# Patient Record
Sex: FEMALE | Race: WHITE | Employment: UNEMPLOYED | URBAN - METROPOLITAN AREA
[De-identification: names, ages, dates, MRNs, and addresses within clinical notes are randomized per-mention and may not be internally consistent; named-entity substitution may affect disease eponyms.]

---

## 2021-05-04 ENCOUNTER — HOSPITAL ENCOUNTER (OUTPATIENT)
Dept: NON INVASIVE DIAGNOSTICS | Age: 66
Discharge: HOME | End: 2021-05-04
Payer: MEDICARE

## 2021-05-04 ENCOUNTER — HOSPITAL ENCOUNTER (OUTPATIENT)
Dept: NUCLEAR MEDICINE | Age: 66
Discharge: HOME | End: 2021-05-04
Payer: MEDICARE

## 2021-05-04 VITALS — BODY MASS INDEX: 34.4 KG/M2 | HEIGHT: 68 IN | WEIGHT: 227 LBS

## 2021-05-04 DIAGNOSIS — I25.10 CORONARY ARTERY DISEASE WITHOUT ANGINA PECTORIS, UNSPECIFIED VESSEL OR LESION TYPE, UNSPECIFIED WHETHER NATIVE OR TRANSPLANTED HEART: ICD-10-CM

## 2021-05-04 LAB
LV EF: 88 %
LVEF MODALITY: NORMAL

## 2021-05-04 PROCEDURE — 3430000000 HC RX DIAGNOSTIC RADIOPHARMACEUTICAL: Performed by: RADIOLOGY

## 2021-05-04 PROCEDURE — 78452 HT MUSCLE IMAGE SPECT MULT: CPT

## 2021-05-04 PROCEDURE — 78452 HT MUSCLE IMAGE SPECT MULT: CPT | Performed by: INTERNAL MEDICINE

## 2021-05-04 PROCEDURE — 6360000002 HC RX W HCPCS: Performed by: NURSE PRACTITIONER

## 2021-05-04 PROCEDURE — A9500 TC99M SESTAMIBI: HCPCS | Performed by: RADIOLOGY

## 2021-05-04 PROCEDURE — 93017 CV STRESS TEST TRACING ONLY: CPT

## 2021-05-04 RX ORDER — OMEPRAZOLE 20 MG/1
40 CAPSULE, DELAYED RELEASE ORAL DAILY
COMMUNITY

## 2021-05-04 RX ORDER — RIVAROXABAN 10 MG/1
10 TABLET, FILM COATED ORAL
COMMUNITY

## 2021-05-04 RX ORDER — AMLODIPINE BESYLATE 5 MG/1
5 TABLET ORAL DAILY
COMMUNITY

## 2021-05-04 RX ORDER — PRAVASTATIN SODIUM 40 MG
40 TABLET ORAL DAILY
COMMUNITY

## 2021-05-04 RX ADMIN — Medication 30 MILLICURIE: at 10:20

## 2021-05-04 RX ADMIN — Medication 10 MILLICURIE: at 08:28

## 2021-05-04 RX ADMIN — REGADENOSON 0.4 MG: 0.08 INJECTION, SOLUTION INTRAVENOUS at 10:16

## 2021-05-05 NOTE — PROCEDURES
[address]                                 PROCEDURE NOTE    PATIENT NAME: Keturah Brambila                       :        1955  MED REC NO:   [id number]                            ROOM:  ACCOUNT NO:   [de-identified]                           ADMIT DATE: 2021  PROVIDER:     Harper Ibarra DO    DATE OF PROCEDURE:  2021    PROCEDURE PERFORMED:  Lexiscan stress test.    INDICATIONS:  The patient is a very polite and pleasant 80-year-old  female who follows with Lilian Beltre. She had an abnormal CT scan on  an outpatient basis, which indicated possible calcified coronary  arteries. Her cardiac risk factors include hypertension,  hyperlipidemia, and extensive tobacco abuse history. PROCEDURE IN DETAIL:  The patient was brought to the Cardiac Stress Lab  and connected to continuous cardiac monitoring. Resting EKG indicated  normal sinus rhythm. She was administered Lexiscan over 10-15 seconds  followed by injection of Cardiolite. She was placed in recovery at 1  minute. Throughout the examination, she had no unusual symptomatology. There was no active chest pain or shortness of breath. There was no  ectopy or dysrhythmia. There were no ST or T-wave abnormalities  identified. She had physiologic response to both blood pressure and  heart rate. She tolerated the procedure well. She will now be  transferred to the Imaging Lab for final stress pictures.         Katia Patricio DO    D: 2021 10:21:00       T: 2021 16:08:43     [email or web address]  Job#: [id number]     Doc#: [id number]    CC:

## 2021-10-31 ENCOUNTER — APPOINTMENT (OUTPATIENT)
Dept: GENERAL RADIOLOGY | Age: 66
End: 2021-10-31
Payer: MEDICARE

## 2021-10-31 ENCOUNTER — HOSPITAL ENCOUNTER (EMERGENCY)
Age: 66
Discharge: HOME | End: 2021-10-31
Attending: EMERGENCY MEDICINE
Payer: MEDICARE

## 2021-10-31 VITALS
DIASTOLIC BLOOD PRESSURE: 94 MMHG | WEIGHT: 230 LBS | SYSTOLIC BLOOD PRESSURE: 172 MMHG | RESPIRATION RATE: 16 BRPM | TEMPERATURE: 97 F | OXYGEN SATURATION: 97 % | BODY MASS INDEX: 34.97 KG/M2 | HEART RATE: 88 BPM

## 2021-10-31 DIAGNOSIS — Z71.89 ADVICE GIVEN ABOUT COVID-19 VIRUS INFECTION: ICD-10-CM

## 2021-10-31 DIAGNOSIS — J02.9 ACUTE PHARYNGITIS, UNSPECIFIED ETIOLOGY: Primary | ICD-10-CM

## 2021-10-31 LAB
ALBUMIN SERPL-MCNC: 4.4 G/DL (ref 3.5–5.2)
ALP BLD-CCNC: 86 U/L (ref 35–104)
ALT SERPL-CCNC: 13 U/L (ref 0–32)
ANION GAP SERPL CALCULATED.3IONS-SCNC: 10 MMOL/L (ref 7–16)
AST SERPL-CCNC: 17 U/L (ref 0–31)
BACTERIA: ABNORMAL /HPF
BASOPHILS ABSOLUTE: 0.1 E9/L (ref 0–0.2)
BASOPHILS RELATIVE PERCENT: 1.2 % (ref 0–2)
BILIRUB SERPL-MCNC: 0.4 MG/DL (ref 0–1.2)
BILIRUBIN URINE: NEGATIVE
BLOOD, URINE: ABNORMAL
BUN BLDV-MCNC: 14 MG/DL (ref 6–23)
CALCIUM SERPL-MCNC: 9.7 MG/DL (ref 8.6–10.2)
CHLORIDE BLD-SCNC: 106 MMOL/L (ref 98–107)
CLARITY: ABNORMAL
CO2: 23 MMOL/L (ref 22–29)
COLOR: YELLOW
CREAT SERPL-MCNC: 1.4 MG/DL (ref 0.5–1)
EOSINOPHILS ABSOLUTE: 0.34 E9/L (ref 0.05–0.5)
EOSINOPHILS RELATIVE PERCENT: 4.1 % (ref 0–6)
EPITHELIAL CELLS, UA: ABNORMAL /HPF
GFR AFRICAN AMERICAN: 46
GFR NON-AFRICAN AMERICAN: 38 ML/MIN/1.73
GLUCOSE BLD-MCNC: 90 MG/DL (ref 74–99)
GLUCOSE URINE: NEGATIVE MG/DL
HCT VFR BLD CALC: 41.7 % (ref 34–48)
HEMOGLOBIN: 12.6 G/DL (ref 11.5–15.5)
IMMATURE GRANULOCYTES #: 0.05 E9/L
IMMATURE GRANULOCYTES %: 0.6 % (ref 0–5)
KETONES, URINE: NEGATIVE MG/DL
LEUKOCYTE ESTERASE, URINE: ABNORMAL
LYMPHOCYTES ABSOLUTE: 2.63 E9/L (ref 1.5–4)
LYMPHOCYTES RELATIVE PERCENT: 31.5 % (ref 20–42)
MCH RBC QN AUTO: 29.7 PG (ref 26–35)
MCHC RBC AUTO-ENTMCNC: 30.2 % (ref 32–34.5)
MCV RBC AUTO: 98.3 FL (ref 80–99.9)
MONOCYTES ABSOLUTE: 0.58 E9/L (ref 0.1–0.95)
MONOCYTES RELATIVE PERCENT: 6.9 % (ref 2–12)
NEUTROPHILS ABSOLUTE: 4.66 E9/L (ref 1.8–7.3)
NEUTROPHILS RELATIVE PERCENT: 55.7 % (ref 43–80)
NITRITE, URINE: NEGATIVE
PDW BLD-RTO: 12.7 FL (ref 11.5–15)
PH UA: 5.5 (ref 5–9)
PLATELET # BLD: 233 E9/L (ref 130–450)
PMV BLD AUTO: 10.8 FL (ref 7–12)
POTASSIUM SERPL-SCNC: 4.2 MMOL/L (ref 3.5–5)
PROTEIN UA: NEGATIVE MG/DL
RBC # BLD: 4.24 E12/L (ref 3.5–5.5)
RBC UA: ABNORMAL /HPF (ref 0–2)
SARS-COV-2, NAAT: NOT DETECTED
SODIUM BLD-SCNC: 139 MMOL/L (ref 132–146)
SPECIFIC GRAVITY UA: 1.02 (ref 1–1.03)
TOTAL PROTEIN: 7.9 G/DL (ref 6.4–8.3)
UROBILINOGEN, URINE: 0.2 E.U./DL
WBC # BLD: 8.4 E9/L (ref 4.5–11.5)
WBC UA: ABNORMAL /HPF (ref 0–5)

## 2021-10-31 PROCEDURE — 6360000002 HC RX W HCPCS: Performed by: EMERGENCY MEDICINE

## 2021-10-31 PROCEDURE — 6370000000 HC RX 637 (ALT 250 FOR IP): Performed by: EMERGENCY MEDICINE

## 2021-10-31 PROCEDURE — 85025 COMPLETE CBC W/AUTO DIFF WBC: CPT

## 2021-10-31 PROCEDURE — 87635 SARS-COV-2 COVID-19 AMP PRB: CPT

## 2021-10-31 PROCEDURE — 71046 X-RAY EXAM CHEST 2 VIEWS: CPT

## 2021-10-31 PROCEDURE — 99282 EMERGENCY DEPT VISIT SF MDM: CPT

## 2021-10-31 PROCEDURE — 87088 URINE BACTERIA CULTURE: CPT

## 2021-10-31 PROCEDURE — 2580000003 HC RX 258: Performed by: PHYSICIAN ASSISTANT

## 2021-10-31 PROCEDURE — 81001 URINALYSIS AUTO W/SCOPE: CPT

## 2021-10-31 PROCEDURE — 80053 COMPREHEN METABOLIC PANEL: CPT

## 2021-10-31 PROCEDURE — 96374 THER/PROPH/DIAG INJ IV PUSH: CPT

## 2021-10-31 RX ORDER — LIDOCAINE HYDROCHLORIDE 20 MG/ML
15 SOLUTION OROPHARYNGEAL PRN
Qty: 100 ML | Refills: 0 | Status: SHIPPED | OUTPATIENT
Start: 2021-10-31

## 2021-10-31 RX ORDER — DEXAMETHASONE SODIUM PHOSPHATE 10 MG/ML
10 INJECTION INTRAMUSCULAR; INTRAVENOUS ONCE
Status: COMPLETED | OUTPATIENT
Start: 2021-10-31 | End: 2021-10-31

## 2021-10-31 RX ORDER — 0.9 % SODIUM CHLORIDE 0.9 %
1000 INTRAVENOUS SOLUTION INTRAVENOUS ONCE
Status: DISCONTINUED | OUTPATIENT
Start: 2021-10-31 | End: 2021-10-31 | Stop reason: HOSPADM

## 2021-10-31 RX ORDER — ONDANSETRON 4 MG/1
4 TABLET, ORALLY DISINTEGRATING ORAL 3 TIMES DAILY PRN
Qty: 21 TABLET | Refills: 0 | Status: SHIPPED | OUTPATIENT
Start: 2021-10-31

## 2021-10-31 RX ORDER — LIDOCAINE HYDROCHLORIDE 20 MG/ML
15 SOLUTION OROPHARYNGEAL ONCE
Status: COMPLETED | OUTPATIENT
Start: 2021-10-31 | End: 2021-10-31

## 2021-10-31 RX ORDER — 0.9 % SODIUM CHLORIDE 0.9 %
1000 INTRAVENOUS SOLUTION INTRAVENOUS ONCE
Status: COMPLETED | OUTPATIENT
Start: 2021-10-31 | End: 2021-10-31

## 2021-10-31 RX ORDER — BROMPHENIRAMINE MALEATE, PSEUDOEPHEDRINE HYDROCHLORIDE, AND DEXTROMETHORPHAN HYDROBROMIDE 2; 30; 10 MG/5ML; MG/5ML; MG/5ML
5 SYRUP ORAL 4 TIMES DAILY PRN
Qty: 240 ML | Refills: 0 | Status: SHIPPED | OUTPATIENT
Start: 2021-10-31 | End: 2021-11-30

## 2021-10-31 RX ADMIN — LIDOCAINE HYDROCHLORIDE 15 ML: 20 SOLUTION ORAL; TOPICAL at 17:11

## 2021-10-31 RX ADMIN — SODIUM CHLORIDE 1000 ML: 9 INJECTION, SOLUTION INTRAVENOUS at 17:10

## 2021-10-31 RX ADMIN — DEXAMETHASONE SODIUM PHOSPHATE 10 MG: 10 INJECTION INTRAMUSCULAR; INTRAVENOUS at 17:11

## 2021-10-31 ASSESSMENT — ENCOUNTER SYMPTOMS
BACK PAIN: 0
SHORTNESS OF BREATH: 0
COUGH: 0
ABDOMINAL PAIN: 0
SORE THROAT: 1

## 2021-10-31 NOTE — ED NOTES
Department of Emergency Medicine  FIRST PROVIDER TRIAGE NOTE             [facility]         10/31/21  11:16 AM EDT    Date of Encounter: No admission date for patient encounter. MRN: [id number]      HPI: Alexa Duncan is a 73 y.o. female who presents to the ED for Cough (headache, fatigue since 10/22, tested negative for covid)  . ROS: Negative for abd pain, vomiting or diarrhea. PE: Gen Appearance/Constitutional: alert  Pulm: CTA bilat     Initial Plan of Care: All treatment areas with department are currently occupied. Plan to order/Initiate the following while awaiting opening in ED: labs and CXR. Labs:  No results found for this visit on 10/31/21. Imaging: All Radiology results interpreted by Radiologist unless otherwise noted.   No orders to display     ED Course      Medications   0.9 % sodium chloride bolus (has no administration in time range)        -------------------------  Patient brought to treatment area for further evaluation  Electronically signed by LISSETT Ray   DD: 10/31/21       [place], [place]  10/31/21 [id number]

## 2021-10-31 NOTE — ED PROVIDER NOTES
Vascular: No JVD. Cardiovascular:      Rate and Rhythm: Normal rate and regular rhythm. Heart sounds: No murmur heard. Pulmonary:      Effort: Pulmonary effort is normal.      Breath sounds: No wheezing, rhonchi or rales. Chest:      Chest wall: No tenderness. Abdominal:      General: There is no distension. Palpations: Abdomen is soft. Tenderness: There is no abdominal tenderness. There is no guarding or rebound. Hernia: No hernia is present. Musculoskeletal:      Cervical back: Normal range of motion and neck supple. Right lower leg: No edema. Left lower leg: No edema. Skin:     General: Skin is warm and dry. Capillary Refill: Capillary refill takes less than 2 seconds. Neurological:      General: No focal deficit present. Mental Status: She is alert and oriented to person, place, and time. Cranial Nerves: No cranial nerve deficit. Psychiatric:         Mood and Affect: Mood normal.         Behavior: Behavior normal.          Procedures     MDM  Number of Diagnoses or Management Options  Acute pharyngitis, unspecified etiology  Advice given about COVID-19 virus infection  Diagnosis management comments: Patient is a 81-year-old female who presented to the ED for evaluation of URI-like symptoms as well as a sore throat. Patient was nontoxic and no signs of peritonsillar abscess or uvular deviation treated with a course of IV steroids in addition to viscous lidocaine with some improvement. Rest x-ray is reassuring no signs of pneumothorax or pneumonia. Patient was reswabbed for Covid. Patient was advised to continue using viscous lidocaine and refrain from smoking any type of tobacco products as she is a frequent smoker.   Patient is able tolerate p.o. intake was given return precautions agreeable this plan.                    --------------------------------------------- PAST HISTORY ---------------------------------------------  Past Medical History: has a past medical history of H/O cardiovascular stress test, Hepatitis C, PE (pulmonary embolism), and Thyroid disease. Past Surgical History:  has a past surgical history that includes joint replacement; Vena Cava Filter Placement; Hysterectomy; Appendectomy; Total knee arthroplasty (Left); and shoulder surgery (Right). Social History:  reports that she quit smoking about 6 months ago. Her smoking use included cigarettes. She smoked 1.00 pack per day. She has never used smokeless tobacco. She reports that she does not drink alcohol and does not use drugs. Family History: family history is not on file. The patients home medications have been reviewed. Allergies: Patient has no known allergies.     -------------------------------------------------- RESULTS -------------------------------------------------  Labs:  Results for orders placed or performed during the hospital encounter of 10/31/21   COVID-19, Rapid   Result Value Ref Range    SARS-CoV-2, NAAT Not Detected Not Detected   CBC Auto Differential   Result Value Ref Range    WBC 8.4 4.5 - 11.5 E9/L    RBC 4.24 3.50 - 5.50 E12/L    Hemoglobin 12.6 11.5 - 15.5 g/dL    Hematocrit 41.7 34.0 - 48.0 %    MCV 98.3 80.0 - 99.9 fL    MCH 29.7 26.0 - 35.0 pg    MCHC 30.2 (L) 32.0 - 34.5 %    RDW 12.7 11.5 - 15.0 fL    Platelets 152 134 - 090 E9/L    MPV 10.8 7.0 - 12.0 fL    Neutrophils % 55.7 43.0 - 80.0 %    Immature Granulocytes % 0.6 0.0 - 5.0 %    Lymphocytes % 31.5 20.0 - 42.0 %    Monocytes % 6.9 2.0 - 12.0 %    Eosinophils % 4.1 0.0 - 6.0 %    Basophils % 1.2 0.0 - 2.0 %    Neutrophils Absolute 4.66 1.80 - 7.30 E9/L    Immature Granulocytes # 0.05 E9/L    Lymphocytes Absolute 2.63 1.50 - 4.00 E9/L    Monocytes Absolute 0.58 0.10 - 0.95 E9/L    Eosinophils Absolute 0.34 0.05 - 0.50 E9/L    Basophils Absolute 0.10 0.00 - 0.20 E9/L   Comprehensive Metabolic Panel   Result Value Ref Range    Sodium 139 132 - 146 mmol/L    Potassium 4.2 3.5 - 5.0 mmol/L    Chloride 106 98 - 107 mmol/L    CO2 23 22 - 29 mmol/L    Anion Gap 10 7 - 16 mmol/L    Glucose 90 74 - 99 mg/dL    BUN 14 6 - 23 mg/dL    CREATININE 1.4 (H) 0.5 - 1.0 mg/dL    GFR Non-African American 38 >=60 mL/min/1.73    GFR African American 46     Calcium 9.7 8.6 - 10.2 mg/dL    Total Protein 7.9 6.4 - 8.3 g/dL    Albumin 4.4 3.5 - 5.2 g/dL    Total Bilirubin 0.4 0.0 - 1.2 mg/dL    Alkaline Phosphatase 86 35 - 104 U/L    ALT 13 0 - 32 U/L    AST 17 0 - 31 U/L   Urinalysis   Result Value Ref Range    Color, UA Yellow Straw/Yellow    Clarity, UA SLCLOUDY Clear    Glucose, Ur Negative Negative mg/dL    Bilirubin Urine Negative Negative    Ketones, Urine Negative Negative mg/dL    Specific Gravity, UA 1.025 1.005 - 1.030    Blood, Urine MODERATE (A) Negative    pH, UA 5.5 5.0 - 9.0    Protein, UA Negative Negative mg/dL    Urobilinogen, Urine 0.2 <2.0 E.U./dL    Nitrite, Urine Negative Negative    Leukocyte Esterase, Urine SMALL (A) Negative   Microscopic Urinalysis   Result Value Ref Range    WBC, UA 1-3 0 - 5 /HPF    RBC, UA NONE 0 - 2 /HPF    Epithelial Cells, UA MODERATE /HPF    Bacteria, UA FEW (A) None Seen /HPF       Radiology:  XR CHEST (2 VW)   Final Result   No acute process. ------------------------- NURSING NOTES AND VITALS REVIEWED ---------------------------  Date / Time Roomed:  10/31/2021  3:20 PM  ED Bed Assignment:  PAMELA/PAMELA    The nursing notes within the ED encounter and vital signs as below have been reviewed. BP (!) 172/94   Pulse 88   Temp 97 °F (36.1 °C)   Resp 16   Wt 230 lb (104.3 kg)   SpO2 97%   BMI 34.97 kg/m²   Oxygen Saturation Interpretation: Normal      ------------------------------------------ PROGRESS NOTES ------------------------------------------  4:17 PM EDT  I have spoken with the patient and discussed todays results, in addition to providing specific details for the plan of care and counseling regarding the diagnosis and prognosis.   Their questions are answered at this time and they are agreeable with the plan. I discussed at length with them reasons for immediate return here for re evaluation. They will followup with their PCP.      --------------------------------- ADDITIONAL PROVIDER NOTES ---------------------------------  At this time the patient is without objective evidence of an acute process requiring hospitalization or inpatient management. They have remained hemodynamically stable throughout their entire ED visit and are stable for discharge with outpatient follow-up. The plan has been discussed in detail and they are aware of the specific conditions for emergent return, as well as the importance of follow-up. Discharge Medication List as of 10/31/2021  6:39 PM      START taking these medications    Details   brompheniramine-pseudoephedrine-DM (BROMFED DM) 2-30-10 MG/5ML syrup Take 5 mLs by mouth 4 times daily as needed for Congestion or Cough, Disp-240 mL, R-0Print      ondansetron (ZOFRAN-ODT) 4 MG disintegrating tablet Take 1 tablet by mouth 3 times daily as needed for Nausea or Vomiting, Disp-21 tablet, R-0Print      lidocaine viscous hcl (XYLOCAINE) 2 % SOLN solution Take 15 mLs by mouth as needed for Irritation, Disp-100 mL, R-0Print             Diagnosis:  1. Acute pharyngitis, unspecified etiology    2. Advice given about COVID-19 virus infection        Disposition:  Patient's disposition: Discharge to home  Patient's condition is stable.       Ligia Hook DO  11/04/21 190

## 2021-10-31 NOTE — Clinical Note
Feliz Marquez was seen and treated in our emergency department on 10/31/2021. She may return to work on 11/08/2021. If you have any questions or concerns, please don't hesitate to call.       Ariana Gonzalez, DO

## 2021-10-31 NOTE — Clinical Note
Ghazala Alfaro was seen and treated in our emergency department on 10/31/2021. She may return to work on 11/08/2021. If you have any questions or concerns, please don't hesitate to call.       Duran Nuñez, DO

## 2021-11-02 LAB — URINE CULTURE, ROUTINE: NORMAL

## 2022-05-17 ENCOUNTER — HOSPITAL ENCOUNTER (OUTPATIENT)
Dept: ULTRASOUND IMAGING | Age: 67
Discharge: HOME | End: 2022-05-17
Payer: MEDICARE

## 2022-05-17 DIAGNOSIS — N28.1 RENAL CYST, LEFT: ICD-10-CM

## 2022-05-17 DIAGNOSIS — E04.1 RIGHT THYROID NODULE: ICD-10-CM

## 2022-05-17 PROCEDURE — 76770 US EXAM ABDO BACK WALL COMP: CPT

## 2022-05-17 PROCEDURE — 76536 US EXAM OF HEAD AND NECK: CPT

## 2022-05-18 ENCOUNTER — OFFICE VISIT (OUTPATIENT)
Dept: NEUROSURGERY | Age: 67
End: 2022-05-18
Payer: MEDICARE

## 2022-05-18 VITALS
HEART RATE: 75 BPM | BODY MASS INDEX: 34.86 KG/M2 | HEIGHT: 68 IN | SYSTOLIC BLOOD PRESSURE: 146 MMHG | TEMPERATURE: 98.1 F | OXYGEN SATURATION: 98 % | DIASTOLIC BLOOD PRESSURE: 84 MMHG | WEIGHT: 230 LBS | RESPIRATION RATE: 20 BRPM

## 2022-05-18 DIAGNOSIS — M51.36 LUMBAR DEGENERATIVE DISC DISEASE: ICD-10-CM

## 2022-05-18 DIAGNOSIS — M54.50 CHRONIC BILATERAL LOW BACK PAIN WITHOUT SCIATICA: Primary | ICD-10-CM

## 2022-05-18 DIAGNOSIS — G89.29 CHRONIC BILATERAL LOW BACK PAIN WITHOUT SCIATICA: Primary | ICD-10-CM

## 2022-05-18 DIAGNOSIS — M48.061 SPINAL STENOSIS OF LUMBAR REGION, UNSPECIFIED WHETHER NEUROGENIC CLAUDICATION PRESENT: ICD-10-CM

## 2022-05-18 PROCEDURE — 3017F COLORECTAL CA SCREEN DOC REV: CPT | Performed by: STUDENT IN AN ORGANIZED HEALTH CARE EDUCATION/TRAINING PROGRAM

## 2022-05-18 PROCEDURE — 1123F ACP DISCUSS/DSCN MKR DOCD: CPT | Performed by: STUDENT IN AN ORGANIZED HEALTH CARE EDUCATION/TRAINING PROGRAM

## 2022-05-18 PROCEDURE — G8417 CALC BMI ABV UP PARAM F/U: HCPCS | Performed by: STUDENT IN AN ORGANIZED HEALTH CARE EDUCATION/TRAINING PROGRAM

## 2022-05-18 PROCEDURE — 1090F PRES/ABSN URINE INCON ASSESS: CPT | Performed by: STUDENT IN AN ORGANIZED HEALTH CARE EDUCATION/TRAINING PROGRAM

## 2022-05-18 PROCEDURE — G8400 PT W/DXA NO RESULTS DOC: HCPCS | Performed by: STUDENT IN AN ORGANIZED HEALTH CARE EDUCATION/TRAINING PROGRAM

## 2022-05-18 PROCEDURE — 4040F PNEUMOC VAC/ADMIN/RCVD: CPT | Performed by: STUDENT IN AN ORGANIZED HEALTH CARE EDUCATION/TRAINING PROGRAM

## 2022-05-18 PROCEDURE — G8427 DOCREV CUR MEDS BY ELIG CLIN: HCPCS | Performed by: STUDENT IN AN ORGANIZED HEALTH CARE EDUCATION/TRAINING PROGRAM

## 2022-05-18 PROCEDURE — 1036F TOBACCO NON-USER: CPT | Performed by: STUDENT IN AN ORGANIZED HEALTH CARE EDUCATION/TRAINING PROGRAM

## 2022-05-18 PROCEDURE — 99203 OFFICE O/P NEW LOW 30 MIN: CPT | Performed by: STUDENT IN AN ORGANIZED HEALTH CARE EDUCATION/TRAINING PROGRAM

## 2022-05-18 RX ORDER — HYDROCODONE BITARTRATE AND ACETAMINOPHEN 5; 325 MG/1; MG/1
TABLET ORAL
COMMUNITY
Start: 2022-01-27

## 2022-05-18 RX ORDER — ACETAMINOPHEN 325 MG/1
TABLET ORAL
COMMUNITY
Start: 2021-04-22

## 2022-05-18 ASSESSMENT — ENCOUNTER SYMPTOMS
ABDOMINAL PAIN: 0
SHORTNESS OF BREATH: 0
PHOTOPHOBIA: 0
TROUBLE SWALLOWING: 0
BACK PAIN: 1

## 2022-05-18 NOTE — PROGRESS NOTES
Subjective:      Patient ID: Nils Siddiqui is a 78 y.o. female who has a PMH of hepatitis C, stage 3 kidney disease, factor V Leiden disorder and thyroid disease who presents with low back pain. Patient states she has had this low back pain since January when she fell and landed on her back. She describes this pain as a stabbing, shooting pain that mostly stays in the lower back. Denies any radiation of the pain down the extremities. Although she states her feet feel heavy. She admits to associated intermittent numbness/tingling in her legs as well as admits to weakness to where her legs give out. She has tried PT at [facility] about 1-2 months ago and it only made the pain worse. She also has tried Injections at [facility] with no relief. She denies any bowel or bladder incontience. She is a former smoker (quit about 1 year ago). She is currently on Xarelto for Factor V Leiden. MRI from [facility] reviewed with patient and showed disc bulges, facet arthropathy and ligament hypertrophy causing mild to moderate canal stenosis at L3-L4, L4-L5 and L5-S1. Review of Systems   Constitutional: Negative for fever. HENT: Negative for trouble swallowing. Eyes: Negative for photophobia. Respiratory: Negative for shortness of breath. Cardiovascular: Negative for chest pain. Gastrointestinal: Negative for abdominal pain. Endocrine: Negative for heat intolerance. Genitourinary: Negative for flank pain. Musculoskeletal: Positive for back pain. Negative for myalgias. Skin: Negative for wound. Neurological: Positive for weakness and numbness. Negative for headaches. Psychiatric/Behavioral: Negative for confusion. Objective:   Physical Exam  HENT:      Head: Normocephalic. Eyes:      Pupils: Pupils are equal, round, and reactive to light. Cardiovascular:      Rate and Rhythm: Normal rate.    Pulmonary:      Effort: Pulmonary effort is normal. Abdominal:      General: There is no distension. Musculoskeletal:         General: Normal range of motion. Cervical back: Normal range of motion. Skin:     General: Skin is warm and dry. Neurological:      Mental Status: She is alert. Comments: A&Ox3  CN3-12 intact  Motor Strength full   Sensation intact to light touch   Reflexes normal  (+)Right Straight Leg test  (-)Left Straight Leg test  Mild tenderness to palpation of lumbar spine   Psychiatric:         Thought Content: Thought content normal.         Assessment:      -Idamae Duane is a 77 y/o female who presents with low back pain. Patient denies any radiation of this pain. He has tried PT at [facility] and [facility] at [facility] with no relief. MRI from [facility] reviewed with patient and showed disc bulges, facet arthropathy and ligament hypertrophy causing mild to moderate canal stenosis at L3-L4, L4-L5 and L5-S1. Plan:      -Pain control and expectations discussed  -Continue with pain clinic  -Flex/Ex ER  -Patient wished to talk to neurosurgeon to discuss surgical options  -OARRS reviewed  -Please call with any questions or concerns.          Zully Robles PA-C

## 2022-06-08 ENCOUNTER — HOSPITAL ENCOUNTER (OUTPATIENT)
Age: 67
Discharge: HOME | End: 2022-06-10
Payer: MEDICARE

## 2022-06-08 ENCOUNTER — OFFICE VISIT (OUTPATIENT)
Dept: NEUROSURGERY | Age: 67
End: 2022-06-08
Payer: MEDICARE

## 2022-06-08 ENCOUNTER — HOSPITAL ENCOUNTER (OUTPATIENT)
Dept: GENERAL RADIOLOGY | Age: 67
Discharge: HOME | End: 2022-06-10
Payer: MEDICARE

## 2022-06-08 VITALS
HEIGHT: 68 IN | OXYGEN SATURATION: 97 % | DIASTOLIC BLOOD PRESSURE: 91 MMHG | TEMPERATURE: 98.2 F | SYSTOLIC BLOOD PRESSURE: 159 MMHG | BODY MASS INDEX: 34.86 KG/M2 | RESPIRATION RATE: 20 BRPM | HEART RATE: 78 BPM | WEIGHT: 230 LBS

## 2022-06-08 DIAGNOSIS — M54.50 ACUTE MIDLINE LOW BACK PAIN WITHOUT SCIATICA: ICD-10-CM

## 2022-06-08 DIAGNOSIS — M54.50 ACUTE MIDLINE LOW BACK PAIN WITHOUT SCIATICA: Primary | ICD-10-CM

## 2022-06-08 PROCEDURE — G8427 DOCREV CUR MEDS BY ELIG CLIN: HCPCS | Performed by: NEUROLOGICAL SURGERY

## 2022-06-08 PROCEDURE — 1123F ACP DISCUSS/DSCN MKR DOCD: CPT | Performed by: NEUROLOGICAL SURGERY

## 2022-06-08 PROCEDURE — 99212 OFFICE O/P EST SF 10 MIN: CPT | Performed by: NEUROLOGICAL SURGERY

## 2022-06-08 PROCEDURE — 1036F TOBACCO NON-USER: CPT | Performed by: NEUROLOGICAL SURGERY

## 2022-06-08 PROCEDURE — G8417 CALC BMI ABV UP PARAM F/U: HCPCS | Performed by: NEUROLOGICAL SURGERY

## 2022-06-08 PROCEDURE — G8400 PT W/DXA NO RESULTS DOC: HCPCS | Performed by: NEUROLOGICAL SURGERY

## 2022-06-08 PROCEDURE — 3017F COLORECTAL CA SCREEN DOC REV: CPT | Performed by: NEUROLOGICAL SURGERY

## 2022-06-08 PROCEDURE — 72120 X-RAY BEND ONLY L-S SPINE: CPT

## 2022-06-08 PROCEDURE — 99212 OFFICE O/P EST SF 10 MIN: CPT

## 2022-06-08 PROCEDURE — 1090F PRES/ABSN URINE INCON ASSESS: CPT | Performed by: NEUROLOGICAL SURGERY

## 2022-06-08 NOTE — PROGRESS NOTES
Patient is here for follow up consult for: back pain    Physical exam  Alert and Oriented X3  PERRLA, EOMI  SWARTZ 5/5  Sensation intact to LT and PP  Reflexes are 2+ and symmetric    A/P: patient is here for follow up for: back pain. Her MRI does not show any significant stenosis. Her flexion/extension x-rays don't show significant listhesis.   I do not feel that surgery would be beneficial.  Recommend continued conservative therapy    Josephine Olvera MD

## 2022-10-10 ENCOUNTER — APPOINTMENT (OUTPATIENT)
Dept: GENERAL RADIOLOGY | Age: 67
End: 2022-10-10
Payer: MEDICARE

## 2022-10-10 ENCOUNTER — HOSPITAL ENCOUNTER (EMERGENCY)
Age: 67
Discharge: HOME | End: 2022-10-10
Attending: EMERGENCY MEDICINE
Payer: MEDICARE

## 2022-10-10 VITALS
OXYGEN SATURATION: 96 % | BODY MASS INDEX: 36.26 KG/M2 | RESPIRATION RATE: 16 BRPM | HEART RATE: 80 BPM | SYSTOLIC BLOOD PRESSURE: 130 MMHG | WEIGHT: 231 LBS | TEMPERATURE: 98.2 F | HEIGHT: 67 IN | DIASTOLIC BLOOD PRESSURE: 76 MMHG

## 2022-10-10 DIAGNOSIS — J18.9 PNEUMONIA OF LEFT LOWER LOBE DUE TO INFECTIOUS ORGANISM: Primary | ICD-10-CM

## 2022-10-10 PROCEDURE — 99283 EMERGENCY DEPT VISIT LOW MDM: CPT

## 2022-10-10 PROCEDURE — 71046 X-RAY EXAM CHEST 2 VIEWS: CPT

## 2022-10-10 RX ORDER — BENZONATATE 100 MG/1
100-200 CAPSULE ORAL 3 TIMES DAILY PRN
Qty: 20 CAPSULE | Refills: 0 | Status: SHIPPED | OUTPATIENT
Start: 2022-10-10 | End: 2022-10-17

## 2022-10-10 RX ORDER — CEFDINIR 300 MG/1
300 CAPSULE ORAL 2 TIMES DAILY
Qty: 14 CAPSULE | Refills: 0 | Status: SHIPPED | OUTPATIENT
Start: 2022-10-10 | End: 2022-10-17

## 2022-10-10 ASSESSMENT — PAIN - FUNCTIONAL ASSESSMENT: PAIN_FUNCTIONAL_ASSESSMENT: NONE - DENIES PAIN

## 2022-10-10 ASSESSMENT — ENCOUNTER SYMPTOMS
RHINORRHEA: 1
BACK PAIN: 0
ABDOMINAL PAIN: 0
COUGH: 1
SORE THROAT: 0
WHEEZING: 0
NAUSEA: 0
CHEST TIGHTNESS: 0
SINUS PRESSURE: 0
SHORTNESS OF BREATH: 0
DIARRHEA: 0
VOMITING: 0

## 2022-10-10 NOTE — ED PROVIDER NOTES
Chief complaint:  URI    HPI history provided by the patient  The patient comes in complaining of 5 days of URI symptoms with chills and fevers and cough and congestion with runny nose and a dry did not fatigue. No chest pain or palpitations or shortness of breath. No stiff neck or head several home tests for COVID and they are negative. Patient is here stating she is concerned that she is developing pneumonia. No abdominal pain, no nausea vomiting or diarrhea. No, no history of blood clots. No arrival.  Nothing really makes her    Review of Systems   Constitutional:  Positive for chills, fatigue and fever. Negative for diaphoresis. HENT:  Positive for congestion and rhinorrhea. Negative for postnasal drip, sinus pressure and sore throat. Respiratory:  Positive for cough. Negative for chest tightness, shortness of breath and wheezing. Cardiovascular:  Negative for chest pain, palpitations and leg swelling. Gastrointestinal:  Negative for abdominal pain, diarrhea, nausea and vomiting. Genitourinary:  Negative for dysuria, flank pain, frequency and urgency. Musculoskeletal:  Negative for arthralgias, back pain, gait problem, joint swelling, myalgias, neck pain and neck stiffness. Skin:  Negative for rash and wound. Neurological:  Negative for dizziness, seizures, syncope, weakness, light-headedness, numbness and headaches. All other systems reviewed and are negative. Physical Exam  Vitals and nursing note reviewed. Constitutional:       General: She is awake. She is not in acute distress. Appearance: She is well-developed. She is not ill-appearing, toxic-appearing or diaphoretic. HENT:      Head: Normocephalic and atraumatic. Right Ear: Tympanic membrane, ear canal and external ear normal.      Left Ear: Tympanic membrane, ear canal and external ear normal.      Nose: Mucosal edema and congestion present. No rhinorrhea. Mouth/Throat:      Pharynx: Oropharynx is clear. Uvula midline. No pharyngeal swelling, oropharyngeal exudate, posterior oropharyngeal erythema or uvula swelling. Tonsils: No tonsillar exudate or tonsillar abscesses. Eyes:      General: No scleral icterus. Extraocular Movements: Extraocular movements intact. Conjunctiva/sclera: Conjunctivae normal.      Pupils: Pupils are equal, round, and reactive to light. Neck:      Trachea: Trachea and phonation normal.   Cardiovascular:      Rate and Rhythm: Normal rate and regular rhythm. Heart sounds: Normal heart sounds. No murmur heard. Pulmonary:      Effort: Pulmonary effort is normal. No respiratory distress. Breath sounds: Normal breath sounds. No stridor, decreased air movement or transmitted upper airway sounds. No decreased breath sounds, wheezing, rhonchi or rales. Chest:      Chest wall: No tenderness. Abdominal:      General: Bowel sounds are normal. There is no distension. Palpations: Abdomen is soft. Tenderness: There is no abdominal tenderness. There is no right CVA tenderness, left CVA tenderness, guarding or rebound. Musculoskeletal:         General: No swelling, tenderness, deformity or signs of injury. Cervical back: Full passive range of motion without pain, normal range of motion and neck supple. No rigidity. No spinous process tenderness or muscular tenderness. Right lower leg: No edema. Left lower leg: No edema. Comments: Arms and legs are neurovascular intact with no pretibial edema or calf pain. Skin:     General: Skin is warm and dry. Coloration: Skin is not cyanotic, jaundiced, mottled or pale. Findings: No bruising, erythema or rash. Neurological:      General: No focal deficit present. Mental Status: She is alert and oriented to person, place, and time. GCS: GCS eye subscore is 4. GCS verbal subscore is 5. GCS motor subscore is 6. Cranial Nerves: Cranial nerves 2-12 are intact. No cranial nerve deficit. Sensory: Sensation is intact. Motor: Motor function is intact. Coordination: Coordination is intact. Coordination normal.   Psychiatric:         Behavior: Behavior is cooperative. Procedures     MDM                  --------------------------------------------- PAST HISTORY ---------------------------------------------  Past Medical History:  has a past medical history of H/O cardiovascular stress test, Hepatitis C, PE (pulmonary embolism), and Thyroid disease. Past Surgical History:  has a past surgical history that includes joint replacement; Vena Cava Filter Placement; Hysterectomy; Appendectomy; Total knee arthroplasty (Left); and shoulder surgery (Right). Social History:  reports that she quit smoking about 17 months ago. Her smoking use included cigarettes. She smoked an average of 1 pack per day. She has never used smokeless tobacco. She reports that she does not drink alcohol and does not use drugs. Family History: family history is not on file. The patients home medications have been reviewed. Allergies: Cortisone    -------------------------------------------------- RESULTS -------------------------------------------------  Labs:  No results found for this visit on 10/10/22. Radiology:  XR CHEST (2 VW)   Final Result   Left basilar minimal subsegmental atelectasis. Otherwise unremarkable exam.             ------------------------- NURSING NOTES AND VITALS REVIEWED ---------------------------  Date / Time Roomed:  10/10/2022 12:48 PM  ED Bed Assignment:  26/26    The nursing notes within the ED encounter and vital signs as below have been reviewed.    BP (!) 143/80   Pulse 87   Temp 98.2 °F (36.8 °C) (Oral)   Resp 16   Ht 5' 7\" (1.702 m)   Wt 231 lb (104.8 kg)   SpO2 96%   BMI 36.18 kg/m²   Oxygen Saturation Interpretation: Normal      ------------------------------------------ PROGRESS NOTES ------------------------------------------  I have spoken with the patient and discussed todays results, in addition to providing specific details for the plan of care and counseling regarding the diagnosis and prognosis. Their questions are answered at this time and they are agreeable with the plan. I discussed at length with them reasons for immediate return here for re evaluation. They will followup with primary care by calling their office tomorrow. --------------------------------- ADDITIONAL PROVIDER NOTES ---------------------------------  At this time the patient is without objective evidence of an acute process requiring hospitalization or inpatient management. They have remained hemodynamically stable throughout their entire ED visit and are stable for discharge with outpatient follow-up. The plan has been discussed in detail and they are aware of the specific conditions for emergent return, as well as the importance of follow-up. New Prescriptions    BENZONATATE (TESSALON) 100 MG CAPSULE    Take 1-2 capsules by mouth 3 times daily as needed for Cough    CEFDINIR (OMNICEF) 300 MG CAPSULE    Take 1 capsule by mouth 2 times daily for 7 days       Diagnosis:  1. Pneumonia of left lower lobe due to infectious organism        Disposition:  Patient's disposition: Discharge to home  Patient's condition is stable.          Abner Richardson DO  10/10/22 1428

## 2022-11-09 ENCOUNTER — HOSPITAL ENCOUNTER (OUTPATIENT)
Dept: NUCLEAR MEDICINE | Age: 67
Discharge: HOME | End: 2022-11-09
Payer: MEDICARE

## 2022-11-09 DIAGNOSIS — R93.89 ABNORMAL CT SCAN: ICD-10-CM

## 2022-11-09 PROCEDURE — 3430000000 HC RX DIAGNOSTIC RADIOPHARMACEUTICAL: Performed by: RADIOLOGY

## 2022-11-09 PROCEDURE — 78014 THYROID IMAGING W/BLOOD FLOW: CPT

## 2022-11-09 PROCEDURE — A9516 IODINE I-123 SOD IODIDE MIC: HCPCS | Performed by: RADIOLOGY

## 2022-11-09 RX ORDER — SODIUM IODIDE I 123 100 UCI/1
200 CAPSULE, GELATIN COATED ORAL ONCE
Status: COMPLETED | OUTPATIENT
Start: 2022-11-09 | End: 2022-11-09

## 2022-11-09 RX ADMIN — SODIUM IODIDE I 123 200 MICRO CURIE: 100 CAPSULE, GELATIN COATED ORAL at 07:08

## 2022-11-10 ENCOUNTER — HOSPITAL ENCOUNTER (OUTPATIENT)
Dept: NUCLEAR MEDICINE | Age: 67
Discharge: HOME | End: 2022-11-10
Payer: MEDICARE

## 2023-05-10 ENCOUNTER — HOSPITAL ENCOUNTER (EMERGENCY)
Age: 68
Discharge: HOME | End: 2023-05-10
Payer: MEDICARE

## 2023-05-10 ENCOUNTER — APPOINTMENT (OUTPATIENT)
Dept: GENERAL RADIOLOGY | Age: 68
End: 2023-05-10
Payer: MEDICARE

## 2023-05-10 VITALS
RESPIRATION RATE: 18 BRPM | TEMPERATURE: 97.7 F | DIASTOLIC BLOOD PRESSURE: 83 MMHG | BODY MASS INDEX: 37.28 KG/M2 | OXYGEN SATURATION: 97 % | HEART RATE: 97 BPM | SYSTOLIC BLOOD PRESSURE: 138 MMHG | WEIGHT: 238 LBS

## 2023-05-10 DIAGNOSIS — M25.562 LEFT KNEE PAIN, UNSPECIFIED CHRONICITY: ICD-10-CM

## 2023-05-10 DIAGNOSIS — M25.552 LEFT HIP PAIN: ICD-10-CM

## 2023-05-10 DIAGNOSIS — M51.36 LUMBAR DEGENERATIVE DISC DISEASE: Primary | ICD-10-CM

## 2023-05-10 DIAGNOSIS — M47.819 FACET ARTHROPATHY: ICD-10-CM

## 2023-05-10 PROCEDURE — 73562 X-RAY EXAM OF KNEE 3: CPT

## 2023-05-10 PROCEDURE — 99283 EMERGENCY DEPT VISIT LOW MDM: CPT

## 2023-05-10 PROCEDURE — 6370000000 HC RX 637 (ALT 250 FOR IP): Performed by: PHYSICIAN ASSISTANT

## 2023-05-10 PROCEDURE — 72100 X-RAY EXAM L-S SPINE 2/3 VWS: CPT

## 2023-05-10 PROCEDURE — 73502 X-RAY EXAM HIP UNI 2-3 VIEWS: CPT

## 2023-05-10 RX ORDER — NAPROXEN 500 MG/1
500 TABLET ORAL 2 TIMES DAILY PRN
Qty: 20 TABLET | Refills: 0 | Status: SHIPPED | OUTPATIENT
Start: 2023-05-10 | End: 2023-05-20

## 2023-05-10 RX ORDER — OXYCODONE HYDROCHLORIDE AND ACETAMINOPHEN 5; 325 MG/1; MG/1
1 TABLET ORAL ONCE
Status: COMPLETED | OUTPATIENT
Start: 2023-05-10 | End: 2023-05-10

## 2023-05-10 RX ORDER — ORPHENADRINE CITRATE 100 MG/1
100 TABLET, EXTENDED RELEASE ORAL 2 TIMES DAILY
Qty: 20 TABLET | Refills: 0 | Status: SHIPPED | OUTPATIENT
Start: 2023-05-10 | End: 2023-05-20

## 2023-05-10 RX ORDER — ACETAMINOPHEN 500 MG
1000 TABLET ORAL ONCE
Status: DISCONTINUED | OUTPATIENT
Start: 2023-05-10 | End: 2023-05-10

## 2023-05-10 RX ADMIN — OXYCODONE AND ACETAMINOPHEN 1 TABLET: 5; 325 TABLET ORAL at 17:27

## 2023-05-10 ASSESSMENT — LIFESTYLE VARIABLES
HOW OFTEN DO YOU HAVE A DRINK CONTAINING ALCOHOL: MONTHLY OR LESS
HOW MANY STANDARD DRINKS CONTAINING ALCOHOL DO YOU HAVE ON A TYPICAL DAY: PATIENT DOES NOT DRINK

## 2023-05-10 ASSESSMENT — PAIN DESCRIPTION - LOCATION
LOCATION: KNEE;HIP
LOCATION: KNEE;HIP

## 2023-05-10 ASSESSMENT — PAIN DESCRIPTION - DESCRIPTORS: DESCRIPTORS: ACHING;GNAWING

## 2023-05-10 ASSESSMENT — PAIN SCALES - GENERAL
PAINLEVEL_OUTOF10: 7
PAINLEVEL_OUTOF10: 8

## 2023-05-10 ASSESSMENT — PAIN DESCRIPTION - ORIENTATION
ORIENTATION: LEFT
ORIENTATION: LEFT

## 2023-05-10 ASSESSMENT — PAIN - FUNCTIONAL ASSESSMENT: PAIN_FUNCTIONAL_ASSESSMENT: 0-10

## 2023-05-10 NOTE — ED PROVIDER NOTES
[facility]dependent [facility] Visit. [address] [facility]  Department of Emergency Medicine   ED  Encounter Note  Admit Date/RoomTime: 5/10/2023  4:31 PM  ED Room:     NAME: Kris Jane  : 1955  MRN: [id number]     Chief Complaint:  Hip Pain and Knee Pain (Lt sided feels swollen to lt nee/ denies spec inj/ started 2-3 days prior)    History of Present Illness       Kris Jane is a 80 y.o. old female who presents to the emergency department by private vehicle, for non-traumatic Left knee, hip, and low back pain which occured 2 day(s) prior to arrival.   The complaint is due to no known cause. Her weight bearing status is difficult secondary to discomfort. Patient has a history of left-sided total knee replacement in  which has never had any complications or needed removed. Patient reports she has had some intermittent knee and hip pain in the past but nothing this significant. Since onset the symptoms have been gradually worsening. Her pain is aggraveated by weight bearing or walking and relieved by nothing. She denies any head injury, headache, loss of consciousness, confusion, dizziness, neck pain, chest pain, abdominal pain, numbness, weakness, blurred vision, nausea, vomiting, fever, chills, wounds, or rash. Patient reports that she takes Xarelto daily as she has factor V blood disorder. She reports that she has had bilateral PEs in the past but has not had a DVT thus far. The patients tetanus status is unknown. ROS   Pertinent positives and negatives are stated within HPI, all other systems reviewed and are negative. Past Medical History:  has a past medical history of H/O cardiovascular stress test, Hepatitis C, PE (pulmonary embolism), and Thyroid disease. Surgical History:  has a past surgical history that includes joint replacement; Vena Cava Filter Placement; Hysterectomy; Appendectomy; Total knee arthroplasty (Left); and shoulder surgery (Right).     Social History:

## 2023-05-19 ENCOUNTER — TELEPHONE (OUTPATIENT)
Dept: NEUROSURGERY | Age: 68
End: 2023-05-19

## 2023-05-19 ENCOUNTER — OFFICE VISIT (OUTPATIENT)
Dept: NEUROSURGERY | Age: 68
End: 2023-05-19
Payer: MEDICARE

## 2023-05-19 VITALS
HEIGHT: 67 IN | DIASTOLIC BLOOD PRESSURE: 82 MMHG | OXYGEN SATURATION: 97 % | SYSTOLIC BLOOD PRESSURE: 119 MMHG | BODY MASS INDEX: 37.35 KG/M2 | HEART RATE: 98 BPM | WEIGHT: 238 LBS

## 2023-05-19 DIAGNOSIS — M51.36 LUMBAR DEGENERATIVE DISC DISEASE: Primary | ICD-10-CM

## 2023-05-19 PROCEDURE — G8400 PT W/DXA NO RESULTS DOC: HCPCS | Performed by: PHYSICIAN ASSISTANT

## 2023-05-19 PROCEDURE — G8417 CALC BMI ABV UP PARAM F/U: HCPCS | Performed by: PHYSICIAN ASSISTANT

## 2023-05-19 PROCEDURE — 1123F ACP DISCUSS/DSCN MKR DOCD: CPT | Performed by: PHYSICIAN ASSISTANT

## 2023-05-19 PROCEDURE — 99202 OFFICE O/P NEW SF 15 MIN: CPT

## 2023-05-19 PROCEDURE — 1036F TOBACCO NON-USER: CPT | Performed by: PHYSICIAN ASSISTANT

## 2023-05-19 PROCEDURE — 1090F PRES/ABSN URINE INCON ASSESS: CPT | Performed by: PHYSICIAN ASSISTANT

## 2023-05-19 PROCEDURE — 99214 OFFICE O/P EST MOD 30 MIN: CPT | Performed by: PHYSICIAN ASSISTANT

## 2023-05-19 PROCEDURE — 3017F COLORECTAL CA SCREEN DOC REV: CPT | Performed by: PHYSICIAN ASSISTANT

## 2023-05-19 PROCEDURE — G8427 DOCREV CUR MEDS BY ELIG CLIN: HCPCS | Performed by: PHYSICIAN ASSISTANT

## 2023-05-19 RX ORDER — GABAPENTIN 300 MG
300 CAPSULE ORAL 3 TIMES DAILY
Qty: 90 CAPSULE | Refills: 3 | Status: SHIPPED | OUTPATIENT
Start: 2023-05-19 | End: 2023-06-18

## 2023-05-19 RX ORDER — CYCLOBENZAPRINE HCL 10 MG
TABLET ORAL
COMMUNITY
Start: 2023-05-12

## 2023-05-19 NOTE — PROGRESS NOTES
[address] [facility]     Patient: Danielle Malhotra  : 1955  MRN: [id number]    Date of Service: 2023    Reason for Referral: Back Pain     History of Present Illness: Patient is a 79 yo female, known to our practice, who presents with acute onset left sided back, hip, and radicular pain following L3/L4 x 1-2 weeks. Patient denies associated trauma. Previous hx of chronic back pain, no surgical intervention in the past (last evaluated on 2022 for mild lumbar stenosis). States the pain is 10/10, constant, aching/stabbing. Leg pain worse then back pain. Admits to some n/t, weakness, and \"spasms\" in the left leg. No loss of bowel or bladder function. No recent PT or pain management modalities (has had PT and injections--ELYSE and SI--in the past). Denies seeing her Ortho specialist. No updated MRI. Was seen in the ED on 5/10/2023 and discharged home in good condition. Allergies:   Cortisone    Past Medical History:      Diagnosis Date    H/O cardiovascular stress test 2021    Lexiscan    Hepatitis C     PE (pulmonary embolism) 07/01/2012    x2 in the past    Thyroid disease        Surgical History:      Procedure Laterality Date    APPENDECTOMY      HYSTERECTOMY (CERVIX STATUS UNKNOWN)      JOINT REPLACEMENT      SHOULDER SURGERY Right     rotator cuff repair    TOTAL KNEE ARTHROPLASTY Left     VENA CAVA FILTER PLACEMENT         Social History:   reports that she quit smoking about 2 years ago. Her smoking use included cigarettes. She has a 50.00 pack-year smoking history. She has never used smokeless tobacco.   reports no history of alcohol use. Family History:  No family history on file.     Review of Systems:  Denies fever, chills, or night sweats  Denies headache, dizziness, syncope  Denies blurred vision, double vision  Denies chest pain, palpitations, SOB  Denies diarrhea, constipation, n/v  Denies dysuria, hematuria  Denies recent infections  Denies easy

## 2023-05-22 ENCOUNTER — TELEPHONE (OUTPATIENT)
Dept: NEUROSURGERY | Age: 68
End: 2023-05-22

## 2023-05-22 DIAGNOSIS — M51.36 LUMBAR DEGENERATIVE DISC DISEASE: Primary | ICD-10-CM

## 2023-05-22 RX ORDER — GABAPENTIN 300 MG/1
300 CAPSULE ORAL 3 TIMES DAILY
Qty: 90 CAPSULE | Refills: 0 | Status: SHIPPED | OUTPATIENT
Start: 2023-05-22 | End: 2023-06-21

## 2023-05-22 NOTE — TELEPHONE ENCOUNTER
Patient called in stating that she went to  her gabapentin and it was $700 dollars and she has no Rx coverage and wanted to know if there was a cheaper alternative to this available ?

## 2023-06-01 ENCOUNTER — HOSPITAL ENCOUNTER (OUTPATIENT)
Dept: MRI IMAGING | Age: 68
Discharge: HOME | End: 2023-06-03
Payer: MEDICARE

## 2023-06-01 ENCOUNTER — HOSPITAL ENCOUNTER (OUTPATIENT)
Age: 68
Discharge: HOME | End: 2023-06-03
Payer: MEDICARE

## 2023-06-01 ENCOUNTER — HOSPITAL ENCOUNTER (OUTPATIENT)
Dept: GENERAL RADIOLOGY | Age: 68
Discharge: HOME | End: 2023-06-03
Payer: MEDICARE

## 2023-06-01 DIAGNOSIS — M51.36 LUMBAR DEGENERATIVE DISC DISEASE: ICD-10-CM

## 2023-06-01 PROCEDURE — 72120 X-RAY BEND ONLY L-S SPINE: CPT

## 2023-06-01 PROCEDURE — 72148 MRI LUMBAR SPINE W/O DYE: CPT

## 2023-06-02 ENCOUNTER — TELEPHONE (OUTPATIENT)
Dept: NEUROSURGERY | Age: 68
End: 2023-06-02

## 2023-06-19 ENCOUNTER — PREP FOR PROCEDURE (OUTPATIENT)
Dept: NEUROSURGERY | Age: 68
End: 2023-06-19

## 2023-06-19 ENCOUNTER — TELEPHONE (OUTPATIENT)
Dept: NEUROSURGERY | Age: 68
End: 2023-06-19

## 2023-06-19 DIAGNOSIS — Z01.818 PRE-OP TESTING: Primary | ICD-10-CM

## 2023-06-19 RX ORDER — SODIUM CHLORIDE 0.9 % (FLUSH) 0.9 %
5-40 SYRINGE (ML) INJECTION PRN
Status: CANCELLED | OUTPATIENT
Start: 2023-06-19

## 2023-06-19 RX ORDER — SODIUM CHLORIDE 0.9 % (FLUSH) 0.9 %
5-40 SYRINGE (ML) INJECTION EVERY 12 HOURS SCHEDULED
Status: CANCELLED | OUTPATIENT
Start: 2023-06-19

## 2023-06-19 RX ORDER — SODIUM CHLORIDE 9 MG/ML
INJECTION, SOLUTION INTRAVENOUS PRN
Status: CANCELLED | OUTPATIENT
Start: 2023-06-19

## 2023-06-19 RX ORDER — SODIUM CHLORIDE 9 MG/ML
INJECTION, SOLUTION INTRAVENOUS CONTINUOUS
Status: CANCELLED | OUTPATIENT
Start: 2023-06-19

## 2023-06-19 NOTE — TELEPHONE ENCOUNTER
Prior Authorization Form:      DEMOGRAPHICS:                     Patient Name:  Hilma Najjar  Patient :  1955            Insurance:  Payor: MEDICARE / Plan: MEDICARE PART A AND B / Product Type: *No Product type* /   Insurance ID Number:    Payer/Plan Subscr  Sex Relation Sub. Ins. ID Effective Group Num   1.  MEDICARE - Nichol Come 1955 Female Self [id number]                                    [address]          DIAGNOSIS & PROCEDURE:                       Procedure/Operation: Left L4-L5 hemilaminectomy and diskectomy           CPT Code: 17468    Diagnosis:  Left L4-L5 herniated disc    ICD10 Code: M51.16    Location:  main    Surgeon:  Hafsa Parker INFORMATION:                          Date: 23    Time:   follow            Anesthesia:  general                                                       Status:  Outpatient        Special Comments:  none       Electronically signed by Eddy Carvajal MA on 2023 at 10:27 AM Elis Alas

## 2023-06-22 ENCOUNTER — PREP FOR PROCEDURE (OUTPATIENT)
Dept: NEUROSURGERY | Age: 68
End: 2023-06-22

## 2023-06-22 PROBLEM — M51.16 LUMBAR DISC HERNIATION WITH RADICULOPATHY: Status: ACTIVE | Noted: 2023-06-22

## 2023-07-13 RX ORDER — OXYCODONE HYDROCHLORIDE AND ACETAMINOPHEN 5; 325 MG/1; MG/1
1 TABLET ORAL EVERY 6 HOURS PRN
Status: ON HOLD | COMMUNITY
End: 2023-07-27 | Stop reason: SDUPTHER

## 2023-07-20 ENCOUNTER — HOSPITAL ENCOUNTER (OUTPATIENT)
Dept: PREADMISSION TESTING | Age: 68
Discharge: HOME | End: 2023-07-20
Payer: MEDICARE

## 2023-07-20 ENCOUNTER — HOSPITAL ENCOUNTER (OUTPATIENT)
Dept: GENERAL RADIOLOGY | Age: 68
Discharge: HOME | End: 2023-07-22
Payer: MEDICARE

## 2023-07-20 VITALS
SYSTOLIC BLOOD PRESSURE: 124 MMHG | BODY MASS INDEX: 37.45 KG/M2 | DIASTOLIC BLOOD PRESSURE: 68 MMHG | OXYGEN SATURATION: 99 % | TEMPERATURE: 98.1 F | WEIGHT: 238.6 LBS | HEIGHT: 67 IN | RESPIRATION RATE: 18 BRPM | HEART RATE: 72 BPM

## 2023-07-20 DIAGNOSIS — Z01.818 PRE-OP TESTING: ICD-10-CM

## 2023-07-20 LAB
ABO + RH BLD: NORMAL
ANION GAP SERPL CALCULATED.3IONS-SCNC: 16 MMOL/L (ref 7–16)
ARM BAND NUMBER: NORMAL
BACTERIA URNS QL MICRO: ABNORMAL
BASOPHILS # BLD: 0.08 K/UL (ref 0–0.2)
BASOPHILS NFR BLD: 1 % (ref 0–2)
BILIRUB UR QL STRIP: NEGATIVE
BLOOD BANK SAMPLE EXPIRATION: NORMAL
BLOOD GROUP ANTIBODIES SERPL: NEGATIVE
BUN SERPL-MCNC: 22 MG/DL (ref 6–23)
CALCIUM SERPL-MCNC: 10.1 MG/DL (ref 8.6–10.2)
CHLORIDE SERPL-SCNC: 107 MMOL/L (ref 98–107)
CLARITY UR: CLEAR
CO2 SERPL-SCNC: 22 MMOL/L (ref 22–29)
COLOR UR: YELLOW
CREAT SERPL-MCNC: 1.4 MG/DL (ref 0.5–1)
EOSINOPHIL # BLD: 0.38 K/UL (ref 0.05–0.5)
EOSINOPHILS RELATIVE PERCENT: 7 % (ref 0–6)
ERYTHROCYTE [DISTWIDTH] IN BLOOD BY AUTOMATED COUNT: 12.3 % (ref 11.5–15)
GFR SERPL CREATININE-BSD FRML MDRD: 43 ML/MIN/1.73M2
GLUCOSE SERPL-MCNC: 90 MG/DL (ref 74–99)
GLUCOSE UR STRIP-MCNC: NEGATIVE MG/DL
HCT VFR BLD AUTO: 42 % (ref 34–48)
HGB BLD-MCNC: 12.7 G/DL (ref 11.5–15.5)
HGB UR QL STRIP.AUTO: ABNORMAL
IMM GRANULOCYTES # BLD AUTO: <0.03 K/UL (ref 0–0.58)
IMM GRANULOCYTES NFR BLD: 0 % (ref 0–5)
INR PPP: 1.1
KETONES UR STRIP-MCNC: NEGATIVE MG/DL
LEUKOCYTE ESTERASE UR QL STRIP: ABNORMAL
LYMPHOCYTES NFR BLD: 1.91 K/UL (ref 1.5–4)
LYMPHOCYTES RELATIVE PERCENT: 33 % (ref 20–42)
MCH RBC QN AUTO: 30.1 PG (ref 26–35)
MCHC RBC AUTO-ENTMCNC: 30.2 G/DL (ref 32–34.5)
MCV RBC AUTO: 99.5 FL (ref 80–99.9)
MONOCYTES NFR BLD: 0.37 K/UL (ref 0.1–0.95)
MONOCYTES NFR BLD: 7 % (ref 2–12)
NEUTROPHILS NFR BLD: 52 % (ref 43–80)
NEUTS SEG NFR BLD: 2.97 K/UL (ref 1.8–7.3)
NITRITE UR QL STRIP: NEGATIVE
PH UR STRIP: 6 [PH] (ref 5–9)
PLATELET # BLD AUTO: 167 K/UL (ref 130–450)
PMV BLD AUTO: 12.2 FL (ref 7–12)
POTASSIUM SERPL-SCNC: 4.5 MMOL/L (ref 3.5–5)
PROT UR STRIP-MCNC: NEGATIVE MG/DL
PROTHROMBIN TIME: 11.4 SEC (ref 9.3–12.4)
RBC # BLD AUTO: 4.22 M/UL (ref 3.5–5.5)
RBC #/AREA URNS HPF: ABNORMAL /HPF
SODIUM SERPL-SCNC: 145 MMOL/L (ref 132–146)
SP GR UR STRIP: 1.01 (ref 1–1.03)
UROBILINOGEN UR STRIP-ACNC: 0.2 EU/DL (ref 0–1)
WBC #/AREA URNS HPF: ABNORMAL /HPF
WBC OTHER # BLD: 5.7 K/UL (ref 4.5–11.5)

## 2023-07-20 PROCEDURE — 93005 ELECTROCARDIOGRAM TRACING: CPT

## 2023-07-20 PROCEDURE — 81001 URINALYSIS AUTO W/SCOPE: CPT

## 2023-07-20 PROCEDURE — 85027 COMPLETE CBC AUTOMATED: CPT

## 2023-07-20 PROCEDURE — 80048 BASIC METABOLIC PNL TOTAL CA: CPT

## 2023-07-20 PROCEDURE — 71046 X-RAY EXAM CHEST 2 VIEWS: CPT

## 2023-07-20 PROCEDURE — 85610 PROTHROMBIN TIME: CPT

## 2023-07-20 PROCEDURE — 86850 RBC ANTIBODY SCREEN: CPT

## 2023-07-20 PROCEDURE — 87086 URINE CULTURE/COLONY COUNT: CPT

## 2023-07-20 PROCEDURE — 86900 BLOOD TYPING SEROLOGIC ABO: CPT

## 2023-07-20 PROCEDURE — 86901 BLOOD TYPING SEROLOGIC RH(D): CPT

## 2023-07-20 PROCEDURE — 36415 COLL VENOUS BLD VENIPUNCTURE: CPT

## 2023-07-21 LAB
EKG ATRIAL RATE: 68 BPM
EKG P AXIS: 41 DEGREES
EKG P-R INTERVAL: 162 MS
EKG Q-T INTERVAL: 398 MS
EKG QRS DURATION: 92 MS
EKG QTC CALCULATION (BAZETT): 423 MS
EKG R AXIS: 30 DEGREES
EKG T AXIS: 39 DEGREES
EKG VENTRICULAR RATE: 68 BPM
MICROORGANISM SPEC CULT: ABNORMAL
SPECIMEN DESCRIPTION: ABNORMAL

## 2023-07-26 ENCOUNTER — ANESTHESIA EVENT (OUTPATIENT)
Dept: OPERATING ROOM | Age: 68
End: 2023-07-26
Payer: MEDICARE

## 2023-07-26 NOTE — H&P
[address] [place] [address] NEUROSURGERY      Patient: George Pablo  : 1955  MRN: [id number]     Date of Service: 2023     Reason for Referral: Back Pain      History of Present Illness: Patient is a 81 yo female, known to our practice, who presents with acute onset left sided back, hip, and radicular pain following L3/L4 x 1-2 weeks. Patient denies associated trauma. Previous hx of chronic back pain, no surgical intervention in the past (last evaluated on 2022 for mild lumbar stenosis). States the pain is 10/10, constant, aching/stabbing. Leg pain worse then back pain. Admits to some n/t, weakness, and \"spasms\" in the left leg. No loss of bowel or bladder function. No recent PT or pain management modalities (has had PT and injections--ELYSE and SI--in the past). Denies seeing her Ortho specialist. No updated MRI. Was seen in the ED on 5/10/2023 and discharged home in good condition. Allergies:   Cortisone     Past Medical History:  Past Medical History             Diagnosis Date    H/O cardiovascular stress test 2021     Lexiscan    Hepatitis C      PE (pulmonary embolism) 07/01/2012     x2 in the past    Thyroid disease              Surgical History:  Past Surgical History             Procedure Laterality Date    APPENDECTOMY        HYSTERECTOMY (CERVIX STATUS UNKNOWN)        JOINT REPLACEMENT        SHOULDER SURGERY Right       rotator cuff repair    TOTAL KNEE ARTHROPLASTY Left      VENA CAVA FILTER PLACEMENT                Social History:   reports that she quit smoking about 2 years ago. Her smoking use included cigarettes. She has a 50.00 pack-year smoking history. She has never used smokeless tobacco.   reports no history of alcohol use. Family History:  Family History   No family history on file.         Review of Systems:  Denies fever, chills, or night sweats  Denies headache, dizziness, syncope  Denies blurred vision, double vision  Denies chest pain, palpitations,

## 2023-07-27 ENCOUNTER — APPOINTMENT (OUTPATIENT)
Dept: GENERAL RADIOLOGY | Age: 68
End: 2023-07-27
Attending: NEUROLOGICAL SURGERY
Payer: MEDICARE

## 2023-07-27 ENCOUNTER — HOSPITAL ENCOUNTER (OUTPATIENT)
Age: 68
Setting detail: OUTPATIENT SURGERY
Discharge: HOME | End: 2023-07-27
Attending: NEUROLOGICAL SURGERY | Admitting: NEUROLOGICAL SURGERY
Payer: MEDICARE

## 2023-07-27 ENCOUNTER — ANESTHESIA (OUTPATIENT)
Dept: OPERATING ROOM | Age: 68
End: 2023-07-27
Payer: MEDICARE

## 2023-07-27 VITALS
RESPIRATION RATE: 16 BRPM | WEIGHT: 238 LBS | TEMPERATURE: 97.2 F | SYSTOLIC BLOOD PRESSURE: 117 MMHG | OXYGEN SATURATION: 99 % | DIASTOLIC BLOOD PRESSURE: 67 MMHG | HEART RATE: 66 BPM | BODY MASS INDEX: 37.35 KG/M2 | HEIGHT: 67 IN

## 2023-07-27 DIAGNOSIS — M51.16 LUMBAR DISC HERNIATION WITH RADICULOPATHY: ICD-10-CM

## 2023-07-27 PROCEDURE — 3600000014 HC SURGERY LEVEL 4 ADDTL 15MIN: Performed by: NEUROLOGICAL SURGERY

## 2023-07-27 PROCEDURE — 2500000003 HC RX 250 WO HCPCS: Performed by: ANESTHESIOLOGY

## 2023-07-27 PROCEDURE — 7100000011 HC PHASE II RECOVERY - ADDTL 15 MIN: Performed by: NEUROLOGICAL SURGERY

## 2023-07-27 PROCEDURE — 2709999900 HC NON-CHARGEABLE SUPPLY: Performed by: NEUROLOGICAL SURGERY

## 2023-07-27 PROCEDURE — 7100000000 HC PACU RECOVERY - FIRST 15 MIN: Performed by: NEUROLOGICAL SURGERY

## 2023-07-27 PROCEDURE — 2720000010 HC SURG SUPPLY STERILE: Performed by: NEUROLOGICAL SURGERY

## 2023-07-27 PROCEDURE — 2580000003 HC RX 258: Performed by: STUDENT IN AN ORGANIZED HEALTH CARE EDUCATION/TRAINING PROGRAM

## 2023-07-27 PROCEDURE — 6360000002 HC RX W HCPCS: Performed by: NEUROLOGICAL SURGERY

## 2023-07-27 PROCEDURE — 6360000002 HC RX W HCPCS: Performed by: ANESTHESIOLOGY

## 2023-07-27 PROCEDURE — 6360000002 HC RX W HCPCS: Performed by: NURSE ANESTHETIST, CERTIFIED REGISTERED

## 2023-07-27 PROCEDURE — 6370000000 HC RX 637 (ALT 250 FOR IP): Performed by: NEUROLOGICAL SURGERY

## 2023-07-27 PROCEDURE — 2580000003 HC RX 258: Performed by: NURSE ANESTHETIST, CERTIFIED REGISTERED

## 2023-07-27 PROCEDURE — 6370000000 HC RX 637 (ALT 250 FOR IP): Performed by: ANESTHESIOLOGY

## 2023-07-27 PROCEDURE — 3600000004 HC SURGERY LEVEL 4 BASE: Performed by: NEUROLOGICAL SURGERY

## 2023-07-27 PROCEDURE — 2500000003 HC RX 250 WO HCPCS: Performed by: NEUROLOGICAL SURGERY

## 2023-07-27 PROCEDURE — 3700000001 HC ADD 15 MINUTES (ANESTHESIA): Performed by: NEUROLOGICAL SURGERY

## 2023-07-27 PROCEDURE — 63047 LAM FACETEC & FORAMOT LUMBAR: CPT | Performed by: NEUROLOGICAL SURGERY

## 2023-07-27 PROCEDURE — 6360000002 HC RX W HCPCS: Performed by: STUDENT IN AN ORGANIZED HEALTH CARE EDUCATION/TRAINING PROGRAM

## 2023-07-27 PROCEDURE — 2500000003 HC RX 250 WO HCPCS: Performed by: NURSE ANESTHETIST, CERTIFIED REGISTERED

## 2023-07-27 PROCEDURE — 2580000003 HC RX 258: Performed by: NEUROLOGICAL SURGERY

## 2023-07-27 PROCEDURE — 7100000010 HC PHASE II RECOVERY - FIRST 15 MIN: Performed by: NEUROLOGICAL SURGERY

## 2023-07-27 PROCEDURE — 3700000000 HC ANESTHESIA ATTENDED CARE: Performed by: NEUROLOGICAL SURGERY

## 2023-07-27 PROCEDURE — 7100000001 HC PACU RECOVERY - ADDTL 15 MIN: Performed by: NEUROLOGICAL SURGERY

## 2023-07-27 PROCEDURE — A4217 STERILE WATER/SALINE, 500 ML: HCPCS | Performed by: NEUROLOGICAL SURGERY

## 2023-07-27 RX ORDER — SODIUM CHLORIDE 9 MG/ML
INJECTION, SOLUTION INTRAVENOUS CONTINUOUS PRN
Status: DISCONTINUED | OUTPATIENT
Start: 2023-07-27 | End: 2023-07-27 | Stop reason: SDUPTHER

## 2023-07-27 RX ORDER — SODIUM CHLORIDE 9 MG/ML
INJECTION, SOLUTION INTRAVENOUS PRN
Status: DISCONTINUED | OUTPATIENT
Start: 2023-07-27 | End: 2023-07-27 | Stop reason: HOSPADM

## 2023-07-27 RX ORDER — FENTANYL CITRATE 50 UG/ML
INJECTION, SOLUTION INTRAMUSCULAR; INTRAVENOUS PRN
Status: DISCONTINUED | OUTPATIENT
Start: 2023-07-27 | End: 2023-07-27 | Stop reason: SDUPTHER

## 2023-07-27 RX ORDER — MIDAZOLAM HYDROCHLORIDE 1 MG/ML
INJECTION INTRAMUSCULAR; INTRAVENOUS PRN
Status: DISCONTINUED | OUTPATIENT
Start: 2023-07-27 | End: 2023-07-27 | Stop reason: SDUPTHER

## 2023-07-27 RX ORDER — HYDROMORPHONE HYDROCHLORIDE 1 MG/ML
0.5 INJECTION, SOLUTION INTRAMUSCULAR; INTRAVENOUS; SUBCUTANEOUS EVERY 5 MIN PRN
Status: COMPLETED | OUTPATIENT
Start: 2023-07-27 | End: 2023-07-27

## 2023-07-27 RX ORDER — CYCLOBENZAPRINE HCL 10 MG
10 TABLET ORAL 3 TIMES DAILY PRN
Qty: 30 TABLET | Refills: 0 | Status: ON HOLD | OUTPATIENT
Start: 2023-07-27 | End: 2023-08-06

## 2023-07-27 RX ORDER — CYCLOBENZAPRINE HCL 10 MG
10 TABLET ORAL
Status: COMPLETED | OUTPATIENT
Start: 2023-07-27 | End: 2023-07-27

## 2023-07-27 RX ORDER — PHENYLEPHRINE HCL IN 0.9% NACL 1 MG/10 ML
SYRINGE (ML) INTRAVENOUS PRN
Status: DISCONTINUED | OUTPATIENT
Start: 2023-07-27 | End: 2023-07-27 | Stop reason: SDUPTHER

## 2023-07-27 RX ORDER — SODIUM CHLORIDE 0.9 % (FLUSH) 0.9 %
5-40 SYRINGE (ML) INJECTION EVERY 12 HOURS SCHEDULED
Status: DISCONTINUED | OUTPATIENT
Start: 2023-07-27 | End: 2023-07-27 | Stop reason: HOSPADM

## 2023-07-27 RX ORDER — BUPIVACAINE HYDROCHLORIDE 2.5 MG/ML
INJECTION, SOLUTION EPIDURAL; INFILTRATION; INTRACAUDAL PRN
Status: DISCONTINUED | OUTPATIENT
Start: 2023-07-27 | End: 2023-07-27 | Stop reason: ALTCHOICE

## 2023-07-27 RX ORDER — OXYCODONE HYDROCHLORIDE AND ACETAMINOPHEN 5; 325 MG/1; MG/1
1 TABLET ORAL
Status: COMPLETED | OUTPATIENT
Start: 2023-07-27 | End: 2023-07-27

## 2023-07-27 RX ORDER — SCOLOPAMINE TRANSDERMAL SYSTEM 1 MG/1
1 PATCH, EXTENDED RELEASE TRANSDERMAL ONCE
Status: DISCONTINUED | OUTPATIENT
Start: 2023-07-27 | End: 2023-07-27 | Stop reason: HOSPADM

## 2023-07-27 RX ORDER — HYDROMORPHONE HYDROCHLORIDE 1 MG/ML
0.25 INJECTION, SOLUTION INTRAMUSCULAR; INTRAVENOUS; SUBCUTANEOUS EVERY 5 MIN PRN
Status: DISCONTINUED | OUTPATIENT
Start: 2023-07-27 | End: 2023-07-27 | Stop reason: HOSPADM

## 2023-07-27 RX ORDER — LIDOCAINE HYDROCHLORIDE 20 MG/ML
INJECTION, SOLUTION INTRAVENOUS PRN
Status: DISCONTINUED | OUTPATIENT
Start: 2023-07-27 | End: 2023-07-27 | Stop reason: SDUPTHER

## 2023-07-27 RX ORDER — SODIUM CHLORIDE 9 MG/ML
INJECTION, SOLUTION INTRAVENOUS CONTINUOUS
Status: DISCONTINUED | OUTPATIENT
Start: 2023-07-27 | End: 2023-07-27 | Stop reason: HOSPADM

## 2023-07-27 RX ORDER — DEXAMETHASONE SODIUM PHOSPHATE 10 MG/ML
INJECTION, SOLUTION INTRAMUSCULAR; INTRAVENOUS PRN
Status: DISCONTINUED | OUTPATIENT
Start: 2023-07-27 | End: 2023-07-27 | Stop reason: SDUPTHER

## 2023-07-27 RX ORDER — ROCURONIUM BROMIDE 10 MG/ML
INJECTION, SOLUTION INTRAVENOUS PRN
Status: DISCONTINUED | OUTPATIENT
Start: 2023-07-27 | End: 2023-07-27 | Stop reason: SDUPTHER

## 2023-07-27 RX ORDER — SODIUM CHLORIDE 0.9 % (FLUSH) 0.9 %
5-40 SYRINGE (ML) INJECTION PRN
Status: DISCONTINUED | OUTPATIENT
Start: 2023-07-27 | End: 2023-07-27 | Stop reason: HOSPADM

## 2023-07-27 RX ORDER — PROPOFOL 10 MG/ML
INJECTION, EMULSION INTRAVENOUS PRN
Status: DISCONTINUED | OUTPATIENT
Start: 2023-07-27 | End: 2023-07-27 | Stop reason: SDUPTHER

## 2023-07-27 RX ORDER — OXYCODONE HYDROCHLORIDE AND ACETAMINOPHEN 5; 325 MG/1; MG/1
1 TABLET ORAL EVERY 4 HOURS PRN
Qty: 42 TABLET | Refills: 0 | Status: SHIPPED | OUTPATIENT
Start: 2023-07-27 | End: 2023-08-02

## 2023-07-27 RX ORDER — LIDOCAINE HYDROCHLORIDE AND EPINEPHRINE 5; 5 MG/ML; UG/ML
INJECTION, SOLUTION INFILTRATION; PERINEURAL PRN
Status: DISCONTINUED | OUTPATIENT
Start: 2023-07-27 | End: 2023-07-27 | Stop reason: ALTCHOICE

## 2023-07-27 RX ORDER — VANCOMYCIN HYDROCHLORIDE 500 MG/10ML
INJECTION, POWDER, LYOPHILIZED, FOR SOLUTION INTRAVENOUS PRN
Status: DISCONTINUED | OUTPATIENT
Start: 2023-07-27 | End: 2023-07-27 | Stop reason: ALTCHOICE

## 2023-07-27 RX ORDER — ONDANSETRON 2 MG/ML
4 INJECTION INTRAMUSCULAR; INTRAVENOUS
Status: COMPLETED | OUTPATIENT
Start: 2023-07-27 | End: 2023-07-27

## 2023-07-27 RX ORDER — ONDANSETRON 2 MG/ML
INJECTION INTRAMUSCULAR; INTRAVENOUS PRN
Status: DISCONTINUED | OUTPATIENT
Start: 2023-07-27 | End: 2023-07-27 | Stop reason: SDUPTHER

## 2023-07-27 RX ORDER — DROPERIDOL 2.5 MG/ML
INJECTION, SOLUTION INTRAMUSCULAR; INTRAVENOUS PRN
Status: DISCONTINUED | OUTPATIENT
Start: 2023-07-27 | End: 2023-07-27 | Stop reason: SDUPTHER

## 2023-07-27 RX ADMIN — PROPOFOL 150 MG: 10 INJECTION, EMULSION INTRAVENOUS at 09:27

## 2023-07-27 RX ADMIN — SODIUM CHLORIDE: 9 INJECTION, SOLUTION INTRAVENOUS at 09:08

## 2023-07-27 RX ADMIN — HYDROMORPHONE HYDROCHLORIDE 0.5 MG: 1 INJECTION, SOLUTION INTRAMUSCULAR; INTRAVENOUS; SUBCUTANEOUS at 11:19

## 2023-07-27 RX ADMIN — ONDANSETRON 4 MG: 2 INJECTION INTRAMUSCULAR; INTRAVENOUS at 11:19

## 2023-07-27 RX ADMIN — DROPERIDOL 0.62 MG: 2.5 INJECTION, SOLUTION INTRAMUSCULAR; INTRAVENOUS at 09:57

## 2023-07-27 RX ADMIN — FENTANYL CITRATE 50 MCG: 0.05 INJECTION, SOLUTION INTRAMUSCULAR; INTRAVENOUS at 09:27

## 2023-07-27 RX ADMIN — ROCURONIUM BROMIDE 50 MG: 10 INJECTION, SOLUTION INTRAVENOUS at 09:28

## 2023-07-27 RX ADMIN — CEFAZOLIN 2000 MG: 2 INJECTION, POWDER, FOR SOLUTION INTRAMUSCULAR; INTRAVENOUS at 09:32

## 2023-07-27 RX ADMIN — FENTANYL CITRATE 50 MCG: 0.05 INJECTION, SOLUTION INTRAMUSCULAR; INTRAVENOUS at 10:34

## 2023-07-27 RX ADMIN — SODIUM CHLORIDE: 9 INJECTION, SOLUTION INTRAVENOUS at 10:19

## 2023-07-27 RX ADMIN — DEXAMETHASONE SODIUM PHOSPHATE 10 MG: 10 INJECTION, SOLUTION INTRAMUSCULAR; INTRAVENOUS at 09:57

## 2023-07-27 RX ADMIN — ONDANSETRON 4 MG: 2 INJECTION INTRAMUSCULAR; INTRAVENOUS at 10:30

## 2023-07-27 RX ADMIN — OXYCODONE HYDROCHLORIDE AND ACETAMINOPHEN 1 TABLET: 5; 325 TABLET ORAL at 12:45

## 2023-07-27 RX ADMIN — HYDROMORPHONE HYDROCHLORIDE 0.5 MG: 1 INJECTION, SOLUTION INTRAMUSCULAR; INTRAVENOUS; SUBCUTANEOUS at 12:01

## 2023-07-27 RX ADMIN — SODIUM CHLORIDE: 9 INJECTION, SOLUTION INTRAVENOUS at 09:21

## 2023-07-27 RX ADMIN — MIDAZOLAM 2 MG: 1 INJECTION INTRAMUSCULAR; INTRAVENOUS at 09:20

## 2023-07-27 RX ADMIN — Medication 100 MCG: at 10:21

## 2023-07-27 RX ADMIN — CYCLOBENZAPRINE 10 MG: 10 TABLET, FILM COATED ORAL at 12:36

## 2023-07-27 RX ADMIN — HYDROMORPHONE HYDROCHLORIDE 0.5 MG: 1 INJECTION, SOLUTION INTRAMUSCULAR; INTRAVENOUS; SUBCUTANEOUS at 11:54

## 2023-07-27 RX ADMIN — LIDOCAINE HYDROCHLORIDE 100 MG: 20 INJECTION, SOLUTION INTRAVENOUS at 09:27

## 2023-07-27 RX ADMIN — HYDROMORPHONE HYDROCHLORIDE 0.5 MG: 1 INJECTION, SOLUTION INTRAMUSCULAR; INTRAVENOUS; SUBCUTANEOUS at 11:28

## 2023-07-27 ASSESSMENT — PAIN DESCRIPTION - LOCATION
LOCATION: BACK

## 2023-07-27 ASSESSMENT — PAIN - FUNCTIONAL ASSESSMENT: PAIN_FUNCTIONAL_ASSESSMENT: NONE - DENIES PAIN

## 2023-07-27 ASSESSMENT — PAIN DESCRIPTION - DESCRIPTORS
DESCRIPTORS: ACHING;DISCOMFORT;SORE
DESCRIPTORS: ACHING;DISCOMFORT;SORE
DESCRIPTORS: DISCOMFORT;SORE

## 2023-07-27 ASSESSMENT — PAIN SCALES - GENERAL
PAINLEVEL_OUTOF10: 7
PAINLEVEL_OUTOF10: 10
PAINLEVEL_OUTOF10: 7
PAINLEVEL_OUTOF10: 7

## 2023-07-27 ASSESSMENT — PAIN DESCRIPTION - PAIN TYPE
TYPE: SURGICAL PAIN;ACUTE PAIN

## 2023-07-27 ASSESSMENT — PAIN DESCRIPTION - ORIENTATION
ORIENTATION: MID;INNER;LOWER

## 2023-07-27 ASSESSMENT — PAIN DESCRIPTION - ONSET
ONSET: ON-GOING
ONSET: GRADUAL

## 2023-07-27 ASSESSMENT — PAIN DESCRIPTION - FREQUENCY: FREQUENCY: CONTINUOUS

## 2023-07-27 NOTE — BRIEF OP NOTE
Brief Postoperative Note      Patient: Basil Daley  YOB: 1955  MRN: [id number]    Date of Procedure: 7/27/2023    Pre-Op Diagnosis Codes:     * Lumbar disc herniation with radiculopathy [M51.16]    Post-Op Diagnosis: Same       Procedure(s):  Left L4-L5 vidya-laminectomy    Surgeon(s):  Marce Sandhu MD    Assistant:  Resident: Jamel Muro DO    Anesthesia: General    Estimated Blood Loss (mL): Minimal    Complications: None    Specimens:   * No specimens in log *    Implants:  * No implants in log *      Drains: * No LDAs found *    Findings: see dictated op note      Electronically signed by Emmitt Brunner, MD on 7/27/2023 at 10:59 AM

## 2023-07-27 NOTE — DISCHARGE INSTRUCTIONS
Discharge Instructions:    1. No lifting more than 10 pounds. 2. Refrain from bending, twisting, or turning at the waist.   3. No brace is needed to be worn. 4. Walking is encouraged, slowly increase time and distance. 5. Can remove dressing and leave open to air two (2) days after surgery . 6. All stitches are under the skin and will dissolve in time. 7. Patient may shower. DO NOT soak or scrub at incision site. 8. Do not drive while taking narcotic pain medications. 9. No sexual activity for one (1) month after surgery   10. Take medications as prescribed. Continue taking stool softener while taking narcotic pain medications. 11. Follow up in the Neurosurgery clinic in 4-6 weeks. No films necessary.   12. Okay to restart Xarelto 7 days after surgery

## 2023-07-27 NOTE — PROGRESS NOTES
CLINICAL PHARMACY NOTE: MEDS TO BEDS    Total # of Prescriptions Filled: 2   The following medications were delivered to the patient:  Flexeril 10  Percocet 5    Additional Documentation:
Dr. Amos Blake at bedside spoke to patient
Patient came to OR 6 with upper dentures in a labeled container. Container will remain with patient upon transfer to PACU.
Patient notified that her last dose is 7/19. She confirmed. She is aware of lovenox starting on 7/20. We will clarify the instructions.
Patient to PACU & placed on appropriate monitors. Cart low, locked with siderails up.
Patient tolerating oral intake     Friend waiting for patient at bedside, she picked patient scripts picked up from pharmacy for patient     Discharge instructions provided to patient, written and verbal, with significant other at bedside, patient verbalized understanding. Iv site removed. Friend Assisted patient in getting dressed. Transport requested via wheelchair.
a sincere effort to keep you informed of delays. If any delays occur with your procedure, we apologize ahead of time for your inconvenience as we recognize the value of your time.

## 2023-07-27 NOTE — ANESTHESIA POSTPROCEDURE EVALUATION
Department of Anesthesiology  Postprocedure Note    Patient: Yissel Christine  MRN: [id number]  YOB: 1955  Date of evaluation: 7/27/2023      Procedure Summary     Date: 07/27/23 Room / Location: [facility] OR 06 / [facility]    Anesthesia Start: 3256 Anesthesia Stop: 1059    Procedure: Left L4-L5 vidya-laminectomy (Spine Lumbar) Diagnosis:       Lumbar disc herniation with radiculopathy      (Lumbar disc herniation with radiculopathy [M51.16])    Surgeons: Fadi Muro MD Responsible Provider: Sharon Dee MD    Anesthesia Type: General ASA Status: 3          Anesthesia Type: General    Uli Phase I: Uli Score: 8    Uli Phase II:        Anesthesia Post Evaluation    Patient location during evaluation: PACU  Patient participation: complete - patient participated  Level of consciousness: awake  Pain score: 3  Airway patency: patent  Nausea & Vomiting: no nausea  Complications: no  Cardiovascular status: hemodynamically stable  Respiratory status: acceptable  Hydration status: stable  Multimodal analgesia pain management approach

## 2023-07-28 ENCOUNTER — PATIENT MESSAGE (OUTPATIENT)
Dept: NEUROSURGERY | Age: 68
End: 2023-07-28

## 2023-07-28 DIAGNOSIS — M51.36 LUMBAR DEGENERATIVE DISC DISEASE: Primary | ICD-10-CM

## 2023-07-28 RX ORDER — OXYCODONE HCL 10 MG/1
10 TABLET, FILM COATED, EXTENDED RELEASE ORAL EVERY 12 HOURS
Qty: 6 TABLET | Refills: 0 | Status: SHIPPED | OUTPATIENT
Start: 2023-07-28 | End: 2023-07-31

## 2023-07-28 NOTE — OP NOTE
[address], [address] [place] [address]                                OPERATIVE REPORT    PATIENT NAME: Bret Watson                     :        1955  MED REC NO:   [id number]                            ROOM:  ACCOUNT NO:   [de-identified]                           ADMIT DATE: 2023  PROVIDER:     Cheikh Gomez MD    DATE OF PROCEDURE:  2023    PREOPERATIVE DIAGNOSIS:  Left-sided L4-L5 foraminal stenosis. POSTOPERATIVE DIAGNOSIS:  Left-sided L4-L5 foraminal stenosis. OPERATIVE PROCEDURE:  Left-sided L4-L5 hemilaminotomy, left-sided L4-L5  medial facetectomy, and left-sided L4 and L5 foraminotomy. ANESTHESIA:  Generalized endotracheal anesthesia. SURGEON:  Cheikh Gomez MD    ASSISTANT:  García Cartwright DO    COMPLICATIONS:  None. ESTIMATED BLOOD LOSS:  Minimal.    SPECIMEN:  None. OPERATIVE INDICATIONS:  The patient is a 52-year-old lady who presented  to ER with complaints of left leg pain. She had an MRI that showed that  she had left-sided foraminal stenosis at L4-L5. She had failed  conservative therapy; and after risks, benefits, and alternatives were  discussed with the patient, it was determined that she would undergo the  above-listed procedure. OPERATIVE PROCEDURE:  The patient was brought into the operating room. A timeout was performed where she was identified by her name, medical  record number, and the operative procedure which she was about to  undergo. Next, induction of generalized endotracheal anesthesia was  then commenced. Upon completion of induction of generalized  endotracheal anesthesia, she received preoperative antibiotics. She was  then flipped in prone position on a Safety harbor table. All pressure points  were padded. Lumbosacral region was prepped and draped in the usual  sterile fashion. After this was done, #10 blade was used to make skin  incision.   Monopolar cautery

## 2023-08-02 ENCOUNTER — APPOINTMENT (OUTPATIENT)
Dept: GENERAL RADIOLOGY | Age: 68
DRG: 640 | End: 2023-08-02
Payer: MEDICARE

## 2023-08-02 ENCOUNTER — APPOINTMENT (OUTPATIENT)
Dept: CT IMAGING | Age: 68
DRG: 640 | End: 2023-08-02
Payer: MEDICARE

## 2023-08-02 ENCOUNTER — HOSPITAL ENCOUNTER (INPATIENT)
Age: 68
LOS: 5 days | Discharge: HOME | DRG: 640 | End: 2023-08-07
Attending: STUDENT IN AN ORGANIZED HEALTH CARE EDUCATION/TRAINING PROGRAM | Admitting: INTERNAL MEDICINE
Payer: MEDICARE

## 2023-08-02 DIAGNOSIS — E87.20 LACTIC ACIDOSIS: ICD-10-CM

## 2023-08-02 DIAGNOSIS — D69.6 THROMBOCYTOPENIA (HCC): ICD-10-CM

## 2023-08-02 DIAGNOSIS — R57.9 SHOCK (HCC): ICD-10-CM

## 2023-08-02 DIAGNOSIS — N17.9 AKI (ACUTE KIDNEY INJURY) (HCC): ICD-10-CM

## 2023-08-02 DIAGNOSIS — A41.9 SEPTICEMIA (HCC): Primary | ICD-10-CM

## 2023-08-02 LAB
ABO + RH BLD: NORMAL
ALBUMIN SERPL-MCNC: 4.1 G/DL (ref 3.5–5.2)
ALP SERPL-CCNC: 78 U/L (ref 35–104)
ALT SERPL-CCNC: 16 U/L (ref 0–32)
ANION GAP SERPL CALCULATED.3IONS-SCNC: 17 MMOL/L (ref 7–16)
ARM BAND NUMBER: NORMAL
AST SERPL-CCNC: 16 U/L (ref 0–31)
BACTERIA URNS QL MICRO: ABNORMAL
BASOPHILS # BLD: 0.13 K/UL (ref 0–0.2)
BASOPHILS NFR BLD: 1 % (ref 0–2)
BILIRUB SERPL-MCNC: 0.5 MG/DL (ref 0–1.2)
BILIRUB UR QL STRIP: NEGATIVE
BLOOD BANK SAMPLE EXPIRATION: NORMAL
BLOOD GROUP ANTIBODIES SERPL: NEGATIVE
BUN SERPL-MCNC: 27 MG/DL (ref 6–23)
CALCIUM SERPL-MCNC: 9.5 MG/DL (ref 8.6–10.2)
CHLORIDE SERPL-SCNC: 105 MMOL/L (ref 98–107)
CHP ED QC CHECK: NORMAL
CLARITY UR: CLEAR
CO2 SERPL-SCNC: 19 MMOL/L (ref 22–29)
COLOR UR: YELLOW
CORTIS SERPL-MCNC: 37.1 UG/DL (ref 2.7–18.4)
CORTISOL COLLECTION INFO: ABNORMAL
CREAT SERPL-MCNC: 2 MG/DL (ref 0.5–1)
CRP SERPL HS-MCNC: 46 MG/L (ref 0–5)
EOSINOPHIL # BLD: 0.67 K/UL (ref 0.05–0.5)
EOSINOPHILS RELATIVE PERCENT: 5 % (ref 0–6)
ERYTHROCYTE [DISTWIDTH] IN BLOOD BY AUTOMATED COUNT: 12.6 % (ref 11.5–15)
ERYTHROCYTE [SEDIMENTATION RATE] IN BLOOD BY WESTERGREN METHOD: 75 MM/HR (ref 0–20)
GFR SERPL CREATININE-BSD FRML MDRD: 27 ML/MIN/1.73M2
GLUCOSE BLD-MCNC: 113 MG/DL
GLUCOSE SERPL-MCNC: 167 MG/DL (ref 74–99)
GLUCOSE UR STRIP-MCNC: NEGATIVE MG/DL
HCT VFR BLD AUTO: 38.5 % (ref 34–48)
HGB BLD-MCNC: 11.4 G/DL (ref 11.5–15.5)
HGB UR QL STRIP.AUTO: ABNORMAL
IMM GRANULOCYTES # BLD AUTO: 0.25 K/UL (ref 0–0.58)
IMM GRANULOCYTES NFR BLD: 2 % (ref 0–5)
KETONES UR STRIP-MCNC: NEGATIVE MG/DL
LACTATE BLDV-SCNC: 1.6 MMOL/L (ref 0.5–2.2)
LACTATE BLDV-SCNC: 2.1 MMOL/L (ref 0.5–2.2)
LACTATE BLDV-SCNC: 3.7 MMOL/L (ref 0.5–1.9)
LEUKOCYTE ESTERASE UR QL STRIP: NEGATIVE
LYMPHOCYTES NFR BLD: 2.74 K/UL (ref 1.5–4)
LYMPHOCYTES RELATIVE PERCENT: 19 % (ref 20–42)
MCH RBC QN AUTO: 30.1 PG (ref 26–35)
MCHC RBC AUTO-ENTMCNC: 29.6 G/DL (ref 32–34.5)
MCV RBC AUTO: 101.6 FL (ref 80–99.9)
METER GLUCOSE: 113 MG/DL (ref 74–99)
MONOCYTES NFR BLD: 0.82 K/UL (ref 0.1–0.95)
MONOCYTES NFR BLD: 6 % (ref 2–12)
NEUTROPHILS NFR BLD: 68 % (ref 43–80)
NEUTS SEG NFR BLD: 9.84 K/UL (ref 1.8–7.3)
NITRITE UR QL STRIP: NEGATIVE
PH UR STRIP: 5.5 [PH] (ref 5–9)
PLATELET # BLD AUTO: 150 K/UL (ref 130–450)
PMV BLD AUTO: 11.2 FL (ref 7–12)
POTASSIUM SERPL-SCNC: 4.1 MMOL/L (ref 3.5–5)
PROCALCITONIN SERPL-MCNC: 0.31 NG/ML (ref 0–0.08)
PROT SERPL-MCNC: 7.7 G/DL (ref 6.4–8.3)
PROT UR STRIP-MCNC: ABNORMAL MG/DL
RBC # BLD AUTO: 3.79 M/UL (ref 3.5–5.5)
RBC #/AREA URNS HPF: ABNORMAL /HPF
SODIUM SERPL-SCNC: 141 MMOL/L (ref 132–146)
SP GR UR STRIP: 1.02 (ref 1–1.03)
T4 FREE SERPL-MCNC: 1.2 NG/DL (ref 0.9–1.7)
TROPONIN I SERPL HS-MCNC: 10 NG/L (ref 0–9)
TROPONIN I SERPL HS-MCNC: 8 NG/L (ref 0–9)
TSH SERPL DL<=0.05 MIU/L-ACNC: 6.68 UIU/ML (ref 0.27–4.2)
UROBILINOGEN UR STRIP-ACNC: 0.2 EU/DL (ref 0–1)
WBC #/AREA URNS HPF: ABNORMAL /HPF
WBC OTHER # BLD: 14.5 K/UL (ref 4.5–11.5)

## 2023-08-02 PROCEDURE — 6360000002 HC RX W HCPCS

## 2023-08-02 PROCEDURE — 87040 BLOOD CULTURE FOR BACTERIA: CPT

## 2023-08-02 PROCEDURE — 2000000000 HC ICU R&B

## 2023-08-02 PROCEDURE — 2500000003 HC RX 250 WO HCPCS: Performed by: STUDENT IN AN ORGANIZED HEALTH CARE EDUCATION/TRAINING PROGRAM

## 2023-08-02 PROCEDURE — 2580000003 HC RX 258: Performed by: STUDENT IN AN ORGANIZED HEALTH CARE EDUCATION/TRAINING PROGRAM

## 2023-08-02 PROCEDURE — 71045 X-RAY EXAM CHEST 1 VIEW: CPT

## 2023-08-02 PROCEDURE — 2580000003 HC RX 258: Performed by: INTERNAL MEDICINE

## 2023-08-02 PROCEDURE — 2580000003 HC RX 258

## 2023-08-02 PROCEDURE — 80053 COMPREHEN METABOLIC PANEL: CPT

## 2023-08-02 PROCEDURE — 36415 COLL VENOUS BLD VENIPUNCTURE: CPT

## 2023-08-02 PROCEDURE — 86901 BLOOD TYPING SEROLOGIC RH(D): CPT

## 2023-08-02 PROCEDURE — 96375 TX/PRO/DX INJ NEW DRUG ADDON: CPT

## 2023-08-02 PROCEDURE — 82533 TOTAL CORTISOL: CPT

## 2023-08-02 PROCEDURE — 82947 ASSAY GLUCOSE BLOOD QUANT: CPT

## 2023-08-02 PROCEDURE — 99285 EMERGENCY DEPT VISIT HI MDM: CPT

## 2023-08-02 PROCEDURE — 86140 C-REACTIVE PROTEIN: CPT

## 2023-08-02 PROCEDURE — 86900 BLOOD TYPING SEROLOGIC ABO: CPT

## 2023-08-02 PROCEDURE — 6360000004 HC RX CONTRAST MEDICATION: Performed by: RADIOLOGY

## 2023-08-02 PROCEDURE — 83605 ASSAY OF LACTIC ACID: CPT

## 2023-08-02 PROCEDURE — 86850 RBC ANTIBODY SCREEN: CPT

## 2023-08-02 PROCEDURE — 84439 ASSAY OF FREE THYROXINE: CPT

## 2023-08-02 PROCEDURE — 84145 PROCALCITONIN (PCT): CPT

## 2023-08-02 PROCEDURE — 93005 ELECTROCARDIOGRAM TRACING: CPT

## 2023-08-02 PROCEDURE — 02HV33Z INSERTION OF INFUSION DEVICE INTO SUPERIOR VENA CAVA, PERCUTANEOUS APPROACH: ICD-10-PCS | Performed by: INTERNAL MEDICINE

## 2023-08-02 PROCEDURE — 84484 ASSAY OF TROPONIN QUANT: CPT

## 2023-08-02 PROCEDURE — 96365 THER/PROPH/DIAG IV INF INIT: CPT

## 2023-08-02 PROCEDURE — 87081 CULTURE SCREEN ONLY: CPT

## 2023-08-02 PROCEDURE — 87086 URINE CULTURE/COLONY COUNT: CPT

## 2023-08-02 PROCEDURE — 81001 URINALYSIS AUTO W/SCOPE: CPT

## 2023-08-02 PROCEDURE — 6360000002 HC RX W HCPCS: Performed by: STUDENT IN AN ORGANIZED HEALTH CARE EDUCATION/TRAINING PROGRAM

## 2023-08-02 PROCEDURE — 84443 ASSAY THYROID STIM HORMONE: CPT

## 2023-08-02 PROCEDURE — 99223 1ST HOSP IP/OBS HIGH 75: CPT | Performed by: INTERNAL MEDICINE

## 2023-08-02 PROCEDURE — 85025 COMPLETE CBC W/AUTO DIFF WBC: CPT

## 2023-08-02 PROCEDURE — 6370000000 HC RX 637 (ALT 250 FOR IP): Performed by: INTERNAL MEDICINE

## 2023-08-02 PROCEDURE — 85652 RBC SED RATE AUTOMATED: CPT

## 2023-08-02 PROCEDURE — 74174 CTA ABD&PLVS W/CONTRAST: CPT

## 2023-08-02 PROCEDURE — 71275 CT ANGIOGRAPHY CHEST: CPT

## 2023-08-02 RX ORDER — LISINOPRIL 30 MG/1
30 TABLET ORAL DAILY
Status: ON HOLD | COMMUNITY
End: 2023-08-07 | Stop reason: SINTOL

## 2023-08-02 RX ORDER — 0.9 % SODIUM CHLORIDE 0.9 %
1000 INTRAVENOUS SOLUTION INTRAVENOUS ONCE
Status: COMPLETED | OUTPATIENT
Start: 2023-08-02 | End: 2023-08-02

## 2023-08-02 RX ORDER — PRAVASTATIN SODIUM 20 MG
40 TABLET ORAL DAILY
Status: DISCONTINUED | OUTPATIENT
Start: 2023-08-03 | End: 2023-08-07 | Stop reason: HOSPADM

## 2023-08-02 RX ORDER — ONDANSETRON 2 MG/ML
4 INJECTION INTRAMUSCULAR; INTRAVENOUS ONCE
Status: DISCONTINUED | OUTPATIENT
Start: 2023-08-02 | End: 2023-08-02

## 2023-08-02 RX ORDER — LEVOTHYROXINE SODIUM 0.12 MG/1
125 TABLET ORAL DAILY
COMMUNITY

## 2023-08-02 RX ORDER — NOREPINEPHRINE BITARTRATE 0.06 MG/ML
1-100 INJECTION, SOLUTION INTRAVENOUS CONTINUOUS
Status: DISCONTINUED | OUTPATIENT
Start: 2023-08-02 | End: 2023-08-04

## 2023-08-02 RX ORDER — OXYCODONE HYDROCHLORIDE AND ACETAMINOPHEN 5; 325 MG/1; MG/1
1 TABLET ORAL EVERY 4 HOURS PRN
Status: ON HOLD | COMMUNITY
Start: 2023-07-27 | End: 2023-08-02

## 2023-08-02 RX ORDER — ACETAMINOPHEN 650 MG/1
650 SUPPOSITORY RECTAL EVERY 6 HOURS PRN
Status: DISCONTINUED | OUTPATIENT
Start: 2023-08-02 | End: 2023-08-07 | Stop reason: HOSPADM

## 2023-08-02 RX ORDER — SODIUM CHLORIDE 9 MG/ML
INJECTION, SOLUTION INTRAVENOUS PRN
Status: DISCONTINUED | OUTPATIENT
Start: 2023-08-02 | End: 2023-08-07 | Stop reason: HOSPADM

## 2023-08-02 RX ORDER — ONDANSETRON 2 MG/ML
INJECTION INTRAMUSCULAR; INTRAVENOUS
Status: COMPLETED
Start: 2023-08-02 | End: 2023-08-02

## 2023-08-02 RX ORDER — POLYETHYLENE GLYCOL 3350 17 G/17G
17 POWDER, FOR SOLUTION ORAL DAILY PRN
Status: DISCONTINUED | OUTPATIENT
Start: 2023-08-02 | End: 2023-08-07 | Stop reason: HOSPADM

## 2023-08-02 RX ORDER — SODIUM CHLORIDE 9 MG/ML
INJECTION, SOLUTION INTRAVENOUS CONTINUOUS
Status: DISCONTINUED | OUTPATIENT
Start: 2023-08-02 | End: 2023-08-03

## 2023-08-02 RX ORDER — SODIUM CHLORIDE 0.9 % (FLUSH) 0.9 %
10 SYRINGE (ML) INJECTION EVERY 12 HOURS SCHEDULED
Status: DISCONTINUED | OUTPATIENT
Start: 2023-08-02 | End: 2023-08-07 | Stop reason: HOSPADM

## 2023-08-02 RX ORDER — ONDANSETRON 2 MG/ML
4 INJECTION INTRAMUSCULAR; INTRAVENOUS EVERY 6 HOURS PRN
Status: DISCONTINUED | OUTPATIENT
Start: 2023-08-02 | End: 2023-08-07 | Stop reason: HOSPADM

## 2023-08-02 RX ORDER — PROMETHAZINE HYDROCHLORIDE 12.5 MG/1
12.5 TABLET ORAL EVERY 6 HOURS PRN
Status: DISCONTINUED | OUTPATIENT
Start: 2023-08-02 | End: 2023-08-07 | Stop reason: HOSPADM

## 2023-08-02 RX ORDER — ACETAMINOPHEN 325 MG/1
650 TABLET ORAL EVERY 6 HOURS PRN
Status: DISCONTINUED | OUTPATIENT
Start: 2023-08-02 | End: 2023-08-07 | Stop reason: HOSPADM

## 2023-08-02 RX ORDER — SODIUM CHLORIDE 0.9 % (FLUSH) 0.9 %
10 SYRINGE (ML) INJECTION PRN
Status: DISCONTINUED | OUTPATIENT
Start: 2023-08-02 | End: 2023-08-07 | Stop reason: HOSPADM

## 2023-08-02 RX ORDER — ONDANSETRON 2 MG/ML
4 INJECTION INTRAMUSCULAR; INTRAVENOUS ONCE
Status: COMPLETED | OUTPATIENT
Start: 2023-08-02 | End: 2023-08-02

## 2023-08-02 RX ORDER — RIVAROXABAN 10 MG/1
10 TABLET, FILM COATED ORAL
Status: ON HOLD | COMMUNITY
End: 2023-08-17 | Stop reason: HOSPADM

## 2023-08-02 RX ORDER — 0.9 % SODIUM CHLORIDE 0.9 %
500 INTRAVENOUS SOLUTION INTRAVENOUS ONCE
Status: COMPLETED | OUTPATIENT
Start: 2023-08-02 | End: 2023-08-03

## 2023-08-02 RX ADMIN — VANCOMYCIN HYDROCHLORIDE 2250 MG: 10 INJECTION, POWDER, LYOPHILIZED, FOR SOLUTION INTRAVENOUS at 16:43

## 2023-08-02 RX ADMIN — ONDANSETRON 4 MG: 2 INJECTION INTRAMUSCULAR; INTRAVENOUS at 16:08

## 2023-08-02 RX ADMIN — SODIUM CHLORIDE 1000 ML: 9 INJECTION, SOLUTION INTRAVENOUS at 12:59

## 2023-08-02 RX ADMIN — IOPAMIDOL 90 ML: 755 INJECTION, SOLUTION INTRAVENOUS at 12:52

## 2023-08-02 RX ADMIN — PROMETHAZINE HYDROCHLORIDE 12.5 MG: 12.5 TABLET ORAL at 22:09

## 2023-08-02 RX ADMIN — SODIUM CHLORIDE 500 ML: 9 INJECTION, SOLUTION INTRAVENOUS at 22:04

## 2023-08-02 RX ADMIN — SODIUM CHLORIDE: 9 INJECTION, SOLUTION INTRAVENOUS at 19:44

## 2023-08-02 RX ADMIN — SODIUM CHLORIDE 1000 ML: 9 INJECTION, SOLUTION INTRAVENOUS at 13:06

## 2023-08-02 RX ADMIN — Medication 5 MCG/MIN: at 14:26

## 2023-08-02 RX ADMIN — CEFEPIME 2000 MG: 2 INJECTION, POWDER, FOR SOLUTION INTRAVENOUS at 16:03

## 2023-08-02 RX ADMIN — PIPERACILLIN AND TAZOBACTAM 4500 MG: 4; .5 INJECTION, POWDER, LYOPHILIZED, FOR SOLUTION INTRAVENOUS at 21:47

## 2023-08-02 RX ADMIN — SODIUM CHLORIDE, PRESERVATIVE FREE 10 ML: 5 INJECTION INTRAVENOUS at 19:55

## 2023-08-02 ASSESSMENT — ENCOUNTER SYMPTOMS
NAUSEA: 1
EYES NEGATIVE: 1
VOMITING: 1
RESPIRATORY NEGATIVE: 1
BACK PAIN: 1
ALLERGIC/IMMUNOLOGIC NEGATIVE: 1

## 2023-08-02 ASSESSMENT — PAIN SCALES - GENERAL
PAINLEVEL_OUTOF10: 0
PAINLEVEL_OUTOF10: 0

## 2023-08-02 NOTE — ED NOTES
This RN contacted lab in regards to the labs that were made as add on at [phone] that are not in process yet. Lab stated they are working on them now.       Savanah Botello RN  08/02/23 0241

## 2023-08-02 NOTE — ED NOTES
The following labs were labeled with appropriate pt sticker and tubed to lab:     [] Blue     [] Lavender   [] on ice  [] Green/yellow  [] Green/black [] on ice  [] Jj Hams  [] on ice  [] Yellow  [] Red  [x] Type/ Screen  [] ABG  [] VBG    [] COVID-19 swab    [] Rapid  [] PCR  [] Flu swab  [] Peds Viral Panel     [] Urine Sample  [] Fecal Sample  [] Pelvic Cultures  [] Blood Cultures  [] X 2  [] STREP Cultures       Falguni Horton RN  08/02/23 6738

## 2023-08-02 NOTE — ED PROVIDER NOTES
[address]        Pt Name: Mp Pinto  MRN: [id number]  [address] [facility], operated by [facility] 1955  Date of evaluation: 8/2/2023  Provider: Torrey Ward DO  PCP: MELANIE Delacruz NP  Note Started: 12:36 PM EDT 8/2/23    CHIEF COMPLAINT       Chief Complaint   Patient presents with    Hypotension     Family called EMS for hypotension 70/34. +n/v. Pt had back surgery w/ Dr. Bryce Espinosa on 27th. 4mg zofran IV given via EMS. HISTORY OF PRESENT ILLNESS: 1 or more Elements   History From: patient    Limitations to history : None    Mp Pinto is a 80 y.o. female with PMH of factor V Leiden, cholecystectomy, appendectomy, PE, HTN, postoperative nausea and vomiting who presents with a complaint of abdominal pain, generalized weakness, nausea, vomiting along with low blood pressure and tingling in her lower extremities. Patient had a lumbar spinal procedure done by Dr. Bryce Espinosa 1 week ago and her symptoms began last night. Her abdominal pain is present in all quadrants. She has not started taking her blood thinner yet. Nursing Notes were all reviewed and agreed with or any disagreements were addressed in the HPI.     REVIEW OF SYSTEMS :      Review of Systems    POSITIVE (+): abdominal pain, nausea, vomiting, diarrhea, generalized weakness  NEGATIVE (-): fevers, chills, chest pain, SOB, dysuria, black or bloody stool, syncope     SURGICAL HISTORY     Past Surgical History:   Procedure Laterality Date    APPENDECTOMY      HYSTERECTOMY (CERVIX STATUS UNKNOWN)      LAMINECTOMY N/A 7/27/2023    Left L4-L5 vidya-laminectomy performed by Laura Basurto MD at [facility] Right     rotator cuff repair    TOTAL KNEE ARTHROPLASTY Left     R TKA 2005    [facility]       Current Discharge Medication List        CONTINUE these medications which have NOT CHANGED    Details   rivaroxaban (XARELTO) 10 MG TABS tablet Take 1 tablet by mouth daily 8/2/23 1426)   iopamidol (ISOVUE-370) 76 % injection 90 mL (90 mLs IntraVENous Given 8/2/23 1252)   sodium chloride 0.9 % bolus 1,000 mL (0 mLs IntraVENous Stopped 8/2/23 1426)   vancomycin (VANCOCIN) 2,250 mg in sodium chloride 0.9 % 500 mL IVPB (0 mg IntraVENous Stopped 8/2/23 1946)   ceFEPIme (MAXIPIME) 2,000 mg in sodium chloride 0.9 % 50 mL IVPB ([id number]) (0 mg IntraVENous Stopped 8/2/23 1641)   ondansetron (ZOFRAN) injection 4 mg (4 mg IntraVENous Given 8/2/23 1608)     Shortly after arrival patient was placed on a monitor and IV access was obtained. She was initially given a liter of fluids that was pressure bagged and due to her very low blood pressure. Labs were ordered to evaluate for the above differential.  While labs are pending patient was sent emergently to the CAT scan to rule out aortic dissection. These results returned and indicated no acute findings, no dissection. Labs indicated a troponin level of 8, lactic of 3.7, glucose of 167 with a creatinine of 2.0 (baseline 1.2) anion gap of 17, white count of 14.5. Urinalysis showed no infection. She was given 2 additional liters of fluids but despite that her pressure remained low so a central line was placed and patient was started on Levophed. Although there is no definitive source of infection patient was started on cefepime and vancomycin to cover for possible spinal infection. Dr. Gray Cedillo was consulted and agreed with our plan to admit the patient to the ICU. I spoke to Dr. Krunal Ritter in the ICU who accepted the patient.      CONSULTS: (Who and What was discussed)  IP CONSULT TO NEUROSURGERY  IP CONSULT TO HOSPITALIST  IP CONSULT TO CRITICAL CARE  PHARMACY TO DOSE VANCOMYCIN  Neurosurgery   Internal medicine  Critical care    Social Determinants : None      Records Reviewed (source and summary): Surgical procedure notes from 7/27/2023 by Dr. Gray Cedillo indicating the patient had a left L4-L5 hemilaminectomy    Disposition Considerations (include 1

## 2023-08-02 NOTE — CONSULTS
[address] [place] [address]  Internal Medicine Residency Program  Consult MICU    Patient:  Cherry Ricks 80 y.o. female MRN: [id number]     Date of Service: 8/2/2023    Hospital Day: 1      Chief complaint: Weakness nausea and vomiting  History of Present Illness   The patient is a 80 y.o. female with past medical history of cholecystectomy, factor V Leyden, PE taking Xarelto, and chronic kidney disease creatinine base 1.4. She has lost 34 pounds in the past 4 months. Patient was purposeful and she was on diet. she had L4-L5 laminectomy on July 27. Today she developed lower extremities weakness (strength 4/5) and generalized numbness in the lower extremities. Along with nausea and vomiting and crampy abdominal pain in the lower part of the abdomen. For the past 3 days she experienced episodes of cold sweats. there is no point tenderness over vertebrae. there is no urinary incontinency. Bowel movements were normal, 2 days ago she had 3 episodes of diarrhea since then she has not had any bowel movement. ED Course: Upon arrival she was hypertensive she was placed on monitor and IV access was obtained. i initially she was given a liter of fluids. labs were ordered she was sent to do imaging. Chest CT angio was unremarkable. Abdomen CTA showed on the left at the L4/L5 level. Findings to suggest small postoperative hematoma or  seroma. Abscess cannot be excluded. Labs indicated a troponin level of 8, lactic of 3.7, glucose of 167 with a creatinine of 2.0 (baseline 1.2) anion gap of 17, white count of 14.5. Urinalysis showed no infection. She was given 2 additional liters of fluids but despite that her pressure remained low so a central line was placed and patient was started on Levophed.         Past Medical History:      Diagnosis Date    H/O cardiovascular stress test 05/04/2021    Lexiscan    Hepatitis C     Hypertension     PE (pulmonary embolism) 07/01/2012    x2 in the past    PONV previous images. I personally discussed the care with the consultants on Milton Lee case. If present the family is updated.      Lin Garber DO, FACP, FCCP, [place],

## 2023-08-02 NOTE — PROGRESS NOTES
Pharmacy Consultation Note  (Antibiotic Dosing and Monitoring)    Initial consult date: 0802/2023  Consulting physician/provider: Uri Fall MD  Drug: Vancomycin  Indication: Sepsis    Age/  Gender Height Weight IBW  Allergy Information   68 y.o./female 5' 7\" (170.2 cm) 241 lb (109.3 kg)     Ideal body weight: 61.6 kg (135 lb 12.9 oz)  Adjusted ideal body weight: 80.7 kg (177 lb 14.1 oz)   Cortisone      Renal Function:  Recent Labs     08/02/23  1252   BUN 27*   CREATININE 2.0*       Intake/Output Summary (Last 24 hours) at 8/2/2023 1911  Last data filed at 8/2/2023 1641  Gross per 24 hour   Intake 3050 ml   Output --   Net 3050 ml       Vancomycin Monitoring:  Trough:  No results for input(s): VANCOTROUGH in the last 72 hours. Random:  No results for input(s): VANCORANDOM in the last 72 hours. No results for input(s): Gwenetta Punter in the last 72 hours. Historical Cultures:  No results found for: ORG  No results for input(s): BC in the last 72 hours. Vancomycin Administration Times:  Recent vancomycin administrations                     vancomycin (VANCOCIN) 2,250 mg in sodium chloride 0.9 % 500 mL IVPB (mg) 2,250 mg New Bag 08/02/23 1643                    Assessment:  Patient is a 80 y.o. female who has been initiated on vancomycin  Estimated Creatinine Clearance: 35 mL/min (A) (based on SCr of 2 mg/dL (H)). To dose vancomycin, pharmacy will be utilizing dosing based off of levels because of patient's renal impairment/insufficiency    Plan:   Will check vancomycin levels when appropriate  Will continue to monitor renal function   Pharmacy to follow      Thom Aragon [address] [place] [address] 8/2/2023 7:11 PM

## 2023-08-02 NOTE — ED NOTES
Radiology Procedure Waiver   Name: Wyatt Cohen  : 1955  MRN: [id number]    Date:  23    Time: 12:37 PM EDT    Benefits of immediately proceeding with radiology exam(s) without pre-testing outweigh the risks or are not indicated as specified below and therefore the following is/are being waived:    [x] Benefits of immediate radiology exam(s) outweigh any risk. OR    Pre-exam testing is not indicated for the following reason(s):  [] Pregnancy test   [] Patients LMP on-time and regular.   [] Patient had Tubal Ligation or has other Contraception Device. [] Patient  is Menopausal or Premenarcheal.    [] Patient had Full or Partial Hysterectomy. [] Protocol for CT contrast allegry   [] Patient has tolerated well previously   [] Patient does not have a true allergy    [] MRI Questionnaire     [] BUN/Creatinine   [] Patient age w/no hx of renal dysfunction. [] Patient on Dialysis. [] Recent Normal Labs.   Electronically signed by Katerin Lee DO on 23 at 12:37 PM EDT

## 2023-08-02 NOTE — ED NOTES
The following labs were labeled with appropriate pt sticker and tubed to lab:     [] Blue     [] Lavender   [] on ice  [x] Green/yellow  [] Green/black [] on ice  [x] Grey  [x] on ice  [] Yellow  [] Red  [] Type/ Screen  [] ABG  [] VBG    [] COVID-19 swab    [] Rapid  [] PCR  [] Flu swab  [] Peds Viral Panel     [] Urine Sample  [] Fecal Sample  [] Pelvic Cultures  [] Blood Cultures  [] X 2  [] STREP Cultures       Anni Haines RN  08/02/23 5350

## 2023-08-02 NOTE — CONSULTS
Other Topics Concern    Not on file   Social History Narrative    Not on file     Social Determinants of Health     Financial Resource Strain: Not on file   Food Insecurity: Not on file   Transportation Needs: Not on file   Physical Activity: Not on file   Stress: Not on file   Social Connections: Not on file   Intimate Partner Violence: Not on file   Housing Stability: Not on file       Medications:   Current Facility-Administered Medications   Medication Dose Route Frequency Provider Last Rate Last Admin    ondansetron (ZOFRAN) injection 4 mg  4 mg IntraVENous Once Theoplis Fontan, DO        norepinephrine (LEVOPHED) 16 mg in sodium chloride 0.9 % 250 mL infusion  1-100 mcg/min IntraVENous Continuous Ashley Florez, DO 4.7 mL/hr at 08/02/23 1426 5 mcg/min at 08/02/23 1426     Current Outpatient Medications   Medication Sig Dispense Refill    oxyCODONE-acetaminophen (PERCOCET) 5-325 MG per tablet Take 1 tablet by mouth every 4 hours as needed for Pain for up to 7 days. Max Daily Amount: 6 tablets 42 tablet 0    cyclobenzaprine (FLEXERIL) 10 MG tablet Take 1 tablet by mouth 3 times daily as needed for Muscle spasms 30 tablet 0    acetaminophen (TYLENOL) 325 MG tablet Take 2 tablets by mouth every 6 hours as needed      pravastatin (PRAVACHOL) 40 MG tablet Take 1 tablet by mouth daily      amLODIPine (NORVASC) 5 MG tablet Take 1 tablet by mouth daily      lisinopril (PRINIVIL;ZESTRIL) 10 MG tablet Take 3 tablets by mouth daily      levothyroxine (SYNTHROID) 75 MCG tablet Take 1 tablet by mouth daily. (Patient taking differently: Take 125 mcg by mouth daily) 30 tablet 1        Allergies:    Cortisone       Review of Systems   Constitutional: Negative. HENT: Negative. Eyes: Negative. Respiratory: Negative. Cardiovascular: Negative. Gastrointestinal:  Positive for nausea and vomiting. Endocrine: Negative. Genitourinary: Negative. Musculoskeletal:  Positive for back pain and gait problem.

## 2023-08-02 NOTE — H&P
Hospital Medicine History & Physical      PCP: MELANIE Delcid - NP    Date of Admission: 8/2/2023    Chief Complaint:    Chief Complaint   Patient presents with    Hypotension     Family called EMS for hypotension 70/34. +n/v. Pt had back surgery w/ Dr. Lynnette Nickerson on 27th. 4mg zofran IV given via EMS. History Of Present Illness:    Patient is a 58-year-old female with past medical history of hypertension PE who presents to the hospital due to nausea vomiting bilateral leg weakness and numbness. According to the patient she started having nausea vomiting, abdominal discomfort today. She denies fevers chills. Patient also mentions she feels dehydrated. Patient recently had lumbar dissecting 1 week ago. Past Medical History:          Diagnosis Date    H/O cardiovascular stress test 05/04/2021    Lexiscan    Hepatitis C     Hypertension     PE (pulmonary embolism) 07/01/2012    x2 in the past    PONV (postoperative nausea and vomiting)     Thyroid disease        Past Surgical History:          Procedure Laterality Date    APPENDECTOMY      HYSTERECTOMY (CERVIX STATUS UNKNOWN)      LAMINECTOMY N/A 7/27/2023    Left L4-L5 vidya-laminectomy performed by Nidhi King MD at [address] Right     rotator cuff repair    TOTAL KNEE ARTHROPLASTY Left     R TKA 2005    VENA CAVA FILTER PLACEMENT         Medications Prior to Admission:      Prior to Admission medications    Medication Sig Start Date End Date Taking?  Authorizing Provider   rivaroxaban (XARELTO) 10 MG TABS tablet Take 1 tablet by mouth daily (with breakfast)   Yes Historical Provider, MD   levothyroxine (SYNTHROID) 125 MCG tablet Take 1 tablet by mouth Daily   Yes Historical Provider, MD   lisinopril (PRINIVIL;ZESTRIL) 30 MG tablet Take 1 tablet by mouth daily   Yes Historical Provider, MD   cyclobenzaprine (FLEXERIL) 10 MG tablet Take 1 tablet by mouth 3 times daily as needed for Muscle spasms 7/27/23 8/6/23  [facility]  Acute renal failure  Status post hemodialysis  Shock likely hypovolemic shock  Lactic acidosis  History of hypertension  History of PE on Xarelto    Plan  Start empirical IV antibiotics with vancomycin, cefepime  Check blood cultures, UA, check chest x-ray, PCT  Started on Levophed, continue IV fluids  Neurosurgery was consulted-notes reviewed, recommends continuous monitoring however CT spine did show expected inflammatory postop surgery changes  Resume home Xarelto  Monitor lactic acid  Monitor and replace electrolytes  Consult nephrology  Monitor BMP  DVT prophylaxis-on Xarelto  Diet: ADULT DIET; Regular; Low Potassium (Less than 3000 mg/day); Low Phosphorus (Less than 1000 mg)  Code Status: Full Code    PT/OT Eval Status: ordered    Dispo - pending clinical improvement       Danie Hinojosa MD    The note was completed using EMR and Dragon dictation system. Every effort was made to ensure accuracy; however, inadvertent computerized transcription errors may be present. Thank you Mele Soriano, APRN - NP for the opportunity to be involved in this patient's care. If you have any questions or concerns please feel free to contact me at [phone].     Danie Hinojosa MD

## 2023-08-02 NOTE — ED NOTES
The following labs were labeled with appropriate pt sticker and tubed to lab:     [] Blue     [x] Lavender   [] on ice  [x] Green/yellow  [] Green/black [] on ice  [x] Grey  [x] on ice  [] Yellow  [] Red  [x] Type/ Screen  [] ABG  [] VBG    [] COVID-19 swab    [] Rapid  [] PCR  [] Flu swab  [] Peds Viral Panel     [] Urine Sample  [] Fecal Sample  [] Pelvic Cultures  [x] Blood Cultures  [x] X 2  [] STREP Cultures       Griselda Mallory, RN  08/02/23 9712

## 2023-08-02 NOTE — PROGRESS NOTES
Patient admitted to room 4418 from ED. Patient oriented to room, call light, bed rails, phone, lights and bathroom. Patient instructed about the schedule of the day including: vital sign frequency, lab draws, possible tests, frequency of MD and staff rounds, including RN/MD rounding together at bedside, daily weights, and I &O's. Patient instructed about television and call light. Bed alarm in place, patient aware of placement and reason. Bedside telemetry in place, patient aware of placement and reason. Bed locked, in lowest position, call light within reach.

## 2023-08-03 ENCOUNTER — APPOINTMENT (OUTPATIENT)
Dept: CT IMAGING | Age: 68
DRG: 640 | End: 2023-08-03
Payer: MEDICARE

## 2023-08-03 LAB
25(OH)D3 SERPL-MCNC: 23.1 NG/ML (ref 30–100)
ANION GAP SERPL CALCULATED.3IONS-SCNC: 13 MMOL/L (ref 7–16)
BASOPHILS # BLD: 0.32 K/UL (ref 0–0.2)
BASOPHILS NFR BLD: 4 % (ref 0–2)
BUN SERPL-MCNC: 29 MG/DL (ref 6–23)
CALCIUM SERPL-MCNC: 8 MG/DL (ref 8.6–10.2)
CHLORIDE SERPL-SCNC: 108 MMOL/L (ref 98–107)
CO2 SERPL-SCNC: 16 MMOL/L (ref 22–29)
CREAT SERPL-MCNC: 2.4 MG/DL (ref 0.5–1)
CREAT UR-MCNC: 116.6 MG/DL (ref 29–226)
CREAT UR-MCNC: 118.8 MG/DL (ref 29–226)
EKG ATRIAL RATE: 118 BPM
EKG P AXIS: 73 DEGREES
EKG P-R INTERVAL: 144 MS
EKG Q-T INTERVAL: 320 MS
EKG QRS DURATION: 82 MS
EKG QTC CALCULATION (BAZETT): 448 MS
EKG R AXIS: 62 DEGREES
EKG T AXIS: 61 DEGREES
EKG VENTRICULAR RATE: 118 BPM
EOSINOPHIL # BLD: 0.65 K/UL (ref 0.05–0.5)
EOSINOPHILS RELATIVE PERCENT: 7 % (ref 0–6)
ERYTHROCYTE [DISTWIDTH] IN BLOOD BY AUTOMATED COUNT: 13.1 % (ref 11.5–15)
FOLATE SERPL-MCNC: 4.7 NG/ML (ref 4.8–24.2)
FOLATE SERPL-MCNC: 5.7 NG/ML (ref 4.8–24.2)
GFR SERPL CREATININE-BSD FRML MDRD: 22 ML/MIN/1.73M2
GLUCOSE FLD-MCNC: 21 MG/DL
GLUCOSE SERPL-MCNC: 95 MG/DL (ref 74–99)
HBA1C MFR BLD: 5 % (ref 4–5.6)
HCT VFR BLD AUTO: 30.8 % (ref 34–48)
HCYS SERPL-SCNC: 23 UMOL/L (ref 0–15)
HGB BLD-MCNC: 8.9 G/DL (ref 11.5–15.5)
INR PPP: 1.3
LYMPHOCYTES NFR BLD: 1.21 K/UL (ref 1.5–4)
LYMPHOCYTES RELATIVE PERCENT: 13 % (ref 20–42)
MAGNESIUM SERPL-MCNC: 2 MG/DL (ref 1.6–2.6)
MCH RBC QN AUTO: 29.7 PG (ref 26–35)
MCHC RBC AUTO-ENTMCNC: 28.9 G/DL (ref 32–34.5)
MCV RBC AUTO: 102.7 FL (ref 80–99.9)
MONOCYTES NFR BLD: 0.24 K/UL (ref 0.1–0.95)
MONOCYTES NFR BLD: 3 % (ref 2–12)
MYELOCYTES ABSOLUTE COUNT: 0.08 K/UL
MYELOCYTES: 1 %
NEUTROPHILS NFR BLD: 73 % (ref 43–80)
NEUTS SEG NFR BLD: 6.79 K/UL (ref 1.8–7.3)
OSMOLALITY UR: 385 MOSM/KG (ref 300–900)
PHOSPHATE SERPL-MCNC: 5.4 MG/DL (ref 2.5–4.5)
PLATELET # BLD AUTO: 65 K/UL (ref 130–450)
PLATELET CONFIRMATION: NORMAL
PMV BLD AUTO: 11.9 FL (ref 7–12)
POTASSIUM SERPL-SCNC: 4.7 MMOL/L (ref 3.5–5)
POTASSIUM, UR: 28.5 MMOL/L
PROT FLD-MCNC: 5.4 G/DL
PROTHROMBIN TIME: 13.7 SEC (ref 9.3–12.4)
RBC # BLD AUTO: 3 M/UL (ref 3.5–5.5)
RBC # BLD: ABNORMAL 10*6/UL
REASON FOR REJECTION: NORMAL
SODIUM SERPL-SCNC: 137 MMOL/L (ref 132–146)
SODIUM UR-SCNC: 35 MMOL/L
SPECIMEN SOURCE: NORMAL
SPECIMEN TYPE: NORMAL
SPECIMEN TYPE: NORMAL
TOTAL PROTEIN, URINE: 20 MG/DL (ref 0–12)
URINE TOTAL PROTEIN CREATININE RATIO: 0.17 (ref 0–0.2)
VIT B12 SERPL-MCNC: 186 PG/ML (ref 211–946)
VIT B12 SERPL-MCNC: 221 PG/ML (ref 211–946)
WBC OTHER # BLD: 9.3 K/UL (ref 4.5–11.5)
ZZ NTE CLEAN UP: ORDERED TEST: NORMAL

## 2023-08-03 PROCEDURE — 2000000000 HC ICU R&B

## 2023-08-03 PROCEDURE — 87075 CULTR BACTERIA EXCEPT BLOOD: CPT

## 2023-08-03 PROCEDURE — 93010 ELECTROCARDIOGRAM REPORT: CPT | Performed by: INTERNAL MEDICINE

## 2023-08-03 PROCEDURE — 82607 VITAMIN B-12: CPT

## 2023-08-03 PROCEDURE — 2580000003 HC RX 258

## 2023-08-03 PROCEDURE — 97162 PT EVAL MOD COMPLEX 30 MIN: CPT

## 2023-08-03 PROCEDURE — 84133 ASSAY OF URINE POTASSIUM: CPT

## 2023-08-03 PROCEDURE — 6370000000 HC RX 637 (ALT 250 FOR IP)

## 2023-08-03 PROCEDURE — 97530 THERAPEUTIC ACTIVITIES: CPT

## 2023-08-03 PROCEDURE — 82570 ASSAY OF URINE CREATININE: CPT

## 2023-08-03 PROCEDURE — 85610 PROTHROMBIN TIME: CPT

## 2023-08-03 PROCEDURE — 51701 INSERT BLADDER CATHETER: CPT

## 2023-08-03 PROCEDURE — 10160 PNXR ASPIR ABSC HMTMA BULLA: CPT

## 2023-08-03 PROCEDURE — 2580000003 HC RX 258: Performed by: INTERNAL MEDICINE

## 2023-08-03 PROCEDURE — 99291 CRITICAL CARE FIRST HOUR: CPT | Performed by: INTERNAL MEDICINE

## 2023-08-03 PROCEDURE — 84100 ASSAY OF PHOSPHORUS: CPT

## 2023-08-03 PROCEDURE — 83986 ASSAY PH BODY FLUID NOS: CPT

## 2023-08-03 PROCEDURE — 82746 ASSAY OF FOLIC ACID SERUM: CPT

## 2023-08-03 PROCEDURE — 84157 ASSAY OF PROTEIN OTHER: CPT

## 2023-08-03 PROCEDURE — 6360000002 HC RX W HCPCS: Performed by: INTERNAL MEDICINE

## 2023-08-03 PROCEDURE — 97166 OT EVAL MOD COMPLEX 45 MIN: CPT

## 2023-08-03 PROCEDURE — 83935 ASSAY OF URINE OSMOLALITY: CPT

## 2023-08-03 PROCEDURE — 83735 ASSAY OF MAGNESIUM: CPT

## 2023-08-03 PROCEDURE — 87070 CULTURE OTHR SPECIMN AEROBIC: CPT

## 2023-08-03 PROCEDURE — 83036 HEMOGLOBIN GLYCOSYLATED A1C: CPT

## 2023-08-03 PROCEDURE — 84156 ASSAY OF PROTEIN URINE: CPT

## 2023-08-03 PROCEDURE — 2709999900 CT ASP ABS HEMATOMA BULLA CYST

## 2023-08-03 PROCEDURE — 83090 ASSAY OF HOMOCYSTEINE: CPT

## 2023-08-03 PROCEDURE — 87522 HEPATITIS C REVRS TRNSCRPJ: CPT

## 2023-08-03 PROCEDURE — 2580000003 HC RX 258: Performed by: CHIROPRACTOR

## 2023-08-03 PROCEDURE — 77012 CT SCAN FOR NEEDLE BIOPSY: CPT

## 2023-08-03 PROCEDURE — 84300 ASSAY OF URINE SODIUM: CPT

## 2023-08-03 PROCEDURE — 6360000002 HC RX W HCPCS

## 2023-08-03 PROCEDURE — 85025 COMPLETE CBC W/AUTO DIFF WBC: CPT

## 2023-08-03 PROCEDURE — 97535 SELF CARE MNGMENT TRAINING: CPT

## 2023-08-03 PROCEDURE — 87205 SMEAR GRAM STAIN: CPT

## 2023-08-03 PROCEDURE — 87389 HIV-1 AG W/HIV-1&-2 AB AG IA: CPT

## 2023-08-03 PROCEDURE — 82945 GLUCOSE OTHER FLUID: CPT

## 2023-08-03 PROCEDURE — 82306 VITAMIN D 25 HYDROXY: CPT

## 2023-08-03 PROCEDURE — 51798 US URINE CAPACITY MEASURE: CPT

## 2023-08-03 PROCEDURE — 009Y3ZX DRAINAGE OF LUMBAR SPINAL CORD, PERCUTANEOUS APPROACH, DIAGNOSTIC: ICD-10-PCS | Performed by: RADIOLOGY

## 2023-08-03 PROCEDURE — 82105 ALPHA-FETOPROTEIN SERUM: CPT

## 2023-08-03 PROCEDURE — 80048 BASIC METABOLIC PNL TOTAL CA: CPT

## 2023-08-03 RX ORDER — FOLIC ACID 1 MG/1
1 TABLET ORAL DAILY
Status: DISCONTINUED | OUTPATIENT
Start: 2023-08-03 | End: 2023-08-07 | Stop reason: HOSPADM

## 2023-08-03 RX ORDER — 0.9 % SODIUM CHLORIDE 0.9 %
500 INTRAVENOUS SOLUTION INTRAVENOUS ONCE
Status: COMPLETED | OUTPATIENT
Start: 2023-08-03 | End: 2023-08-03

## 2023-08-03 RX ORDER — CHOLECALCIFEROL (VITAMIN D3) 50 MCG
2000 TABLET ORAL DAILY
Status: DISCONTINUED | OUTPATIENT
Start: 2023-08-03 | End: 2023-08-07 | Stop reason: HOSPADM

## 2023-08-03 RX ORDER — COSYNTROPIN 0.25 MG/ML
250 INJECTION, POWDER, FOR SOLUTION INTRAMUSCULAR; INTRAVENOUS ONCE
Status: DISCONTINUED | OUTPATIENT
Start: 2023-08-04 | End: 2023-08-03

## 2023-08-03 RX ADMIN — LEVOTHYROXINE SODIUM 125 MCG: 0.1 TABLET ORAL at 06:11

## 2023-08-03 RX ADMIN — SODIUM CHLORIDE 500 ML: 9 INJECTION, SOLUTION INTRAVENOUS at 09:20

## 2023-08-03 RX ADMIN — SODIUM CHLORIDE, PRESERVATIVE FREE 10 ML: 5 INJECTION INTRAVENOUS at 09:18

## 2023-08-03 RX ADMIN — SODIUM CHLORIDE 500 ML: 9 INJECTION, SOLUTION INTRAVENOUS at 17:09

## 2023-08-03 RX ADMIN — PIPERACILLIN AND TAZOBACTAM 4500 MG: 4; .5 INJECTION, POWDER, LYOPHILIZED, FOR SOLUTION INTRAVENOUS at 05:33

## 2023-08-03 RX ADMIN — ONDANSETRON 4 MG: 2 INJECTION INTRAMUSCULAR; INTRAVENOUS at 14:25

## 2023-08-03 RX ADMIN — PIPERACILLIN AND TAZOBACTAM 4500 MG: 4; .5 INJECTION, POWDER, LYOPHILIZED, FOR SOLUTION INTRAVENOUS at 13:34

## 2023-08-03 RX ADMIN — PIPERACILLIN AND TAZOBACTAM 4500 MG: 4; .5 INJECTION, POWDER, LYOPHILIZED, FOR SOLUTION INTRAVENOUS at 21:11

## 2023-08-03 RX ADMIN — SODIUM CHLORIDE, PRESERVATIVE FREE 10 ML: 5 INJECTION INTRAVENOUS at 21:13

## 2023-08-03 ASSESSMENT — PAIN SCALES - GENERAL
PAINLEVEL_OUTOF10: 0

## 2023-08-03 NOTE — CARE COORDINATION
Care Coordination LOS 1 day. Recent procedure 7/27/23 L4-L5 hemilaminectomy, L4-L5 medial facetectomy, and L4 and L5 foraminotomy. Presents to ED with leg numbness, hypotension. Admitted MICU septicemia, shock, lactic acidosis.  Pt for abscess aspiration today, will follow for transition of care needs    Electronically signed by Jaxson Nguyen RN on 8/3/2023 at 1:12 PM

## 2023-08-03 NOTE — PROGRESS NOTES
shower  Equipment owned: grab bar in shower, elevated toilet    Prior Level of Function: Ind with ADLs; Ind with IADLs. No AD for functional mobility. Driving: yes  Occupation: None stated    Pain Level: No c/o pain this date. Cognition: A&O: 4/4; Follows 2 step commands   Memory: G   Comprehension: G   Problem solving: G   Judgement/safety: G-               Communication skills: WFL           Vision: WFL               Glasses:reading                                                   Hearing: WFL     RASS: 0  CAM-ICU: (NT) Delirium    UE Assessment:   Hand Dominance: Right [x]  Left []     ROM Strength STM goal: PRN   RUE  WFL WFL  : WFL   Increase overall strength for participation in ADLs. LUE WFL WFL  : WFL   Increase overall strength for participation in ADLs. Sensation: No c/o numbness/tingling in extremities. Tone: WNL   Edema: BLE     Functional Assessment:  AM-PAC Daily Activity Raw Score: 16/24   Initial Eval Status  Date: 8/3/23 Treatment Status  Date:  STGs = LTGs  Time frame: 7-14 days   Feeding Ind                             Grooming Min A overall                      Ind        UB dressing/bathing Min A                      Ind       LB dressing/bathing Mod A  Seated in chair                      Ind        Toileting Mod A                      Ind     Bed Mobility  Supine to sit:   Min A    Sit to supine:   Min A                      Ind     Functional Transfers Sit to stand:   Bed: Min A  Chair: Mod A    Stand to sit:   Min A    Stand Pivot:   Min A                      Ind     Functional Mobility Min A with FWW  For few steps from Eob <>chair                      Ind        Balance Sitting:     Static: SBA    Dynamic:Min A  Standing: Min A  Sitting:     Static:  Ind    Dynamic:Ind  Standing: Ind                   Endurance/Activity Tolerance   fair tolerance with light activity.  Limited by BP                      WFL  For full ADL   Visual/  Perceptual   [facility] assessment of data and development of POC/Goals.      SONIA Rodriguez,  OTR/L; [id number]

## 2023-08-03 NOTE — PROGRESS NOTES
Physical Therapy  Physical Therapy Initial Assessment     Name: Heather Law  : 1955  MRN: [id number]      Date of Service: 8/3/2023    Evaluating PT:  Jarrod Leary PT, DPT  [id number]     Room #:  2444/7907-G  Diagnosis:  Lactic acidosis [E87.20]  Shock ([address]) [R57.9]  Septicemia ([address]) [A41.9]  DARWIN (acute kidney injury) ([address]) [N17.9]  PMHx/PSHx:   has a past medical history of H/O cardiovascular stress test, Hepatitis C, Hypertension, PE (pulmonary embolism), PONV (postoperative nausea and vomiting), and Thyroid disease. Procedure/Surgery:  None this admission -- recent L4-L5 hemilaminectomy, L4-L5 medial facetectomy, and L4 and L5 foraminotomy 23  Precautions:  Falls, Spinal precautions, Monitor BP  Equipment Needs:  TBD    SUBJECTIVE:    Pt lives with 'other adult' in a 1 story home with ramp stairs and 2 rail to enter. Bedroom and bathroom are on the main level. Pt ambulated with no device PTA. OBJECTIVE:   Initial Evaluation  Date: 8/3/23 Treatment Short Term/ Long Term   Goals   AM-PAC 6 Clicks      Was pt agreeable to Eval/treatment? yes     Does pt have pain? 0/10     Bed Mobility  Rolling: NT  Supine to sit: Min A  Sit to supine: Min A  Scooting: Min A  Rolling: Independent   Supine to sit: Independent   Sit to supine: Independent   Scooting: Independent    Transfers Sit to stand: Min A from bed vs Mod A from chair  Stand to sit: Min A  Stand pivot: Min A [employer]  Sit to stand: Independent   Stand to sit: Independent   Stand pivot:  Mod Independent  AAD   Ambulation    3 feet and pivot to chair x2 Min A [employer]  >150 feet with AAD Mod Independent    Stair negotiation: ascended and descended NT  N/A   ROM BUE:  Per OT eval   BLE:  WFL     Strength BUE:  Per OT eval   BLE:  WFL     Balance Sitting EOB:  SBA  Dynamic Standing:  Min A [employer]  Sitting EOB:  Independent   Dynamic Standing:  Independent  AAD     Pt is A & O x 4  RASS:  0  CAM-ICU:  NT  Sensation:  Pt reports numbness and tingling to lower

## 2023-08-03 NOTE — PROGRESS NOTES
Spoke with Yomi Ahuja RN regarding abscess aspiration today. Patient is able to sign consent, will keep patient NPO. Need INR per ordering physician. Will call when able to send for patient.

## 2023-08-03 NOTE — PLAN OF CARE
Problem: Discharge Planning  Goal: Discharge to home or other facility with appropriate resources  8/3/2023 1702 by Yulia Dacuda [facility]  Outcome: Not Progressing  Flowsheets (Taken 8/3/2023 0800)  Discharge to home or other facility with appropriate resources:   Identify barriers to discharge with patient and caregiver   Arrange for needed discharge resources and transportation as appropriate     Problem: Safety - Adult  Goal: Free from fall injury  8/3/2023 1702 by Mick Sahu  Outcome: Progressing     Problem: ABCDS Injury Assessment  Goal: Absence of physical injury  8/3/2023 1702 by Mick Sahu  Outcome: Progressing  Flowsheets (Taken 8/3/2023 1011)  Absence of Physical Injury: Implement safety measures based on patient assessment     Problem: Skin/Tissue Integrity  Goal: Absence of new skin breakdown  Description: 1. Monitor for areas of redness and/or skin breakdown  2. Assess vascular access sites hourly  3. Every 4-6 hours minimum:  Change oxygen saturation probe site  4. Every 4-6 hours:  If on nasal continuous positive airway pressure, respiratory therapy assess nares and determine need for appliance change or resting period.   8/3/2023 1702 by Yulia [address]  Outcome: Progressing     Problem: Pain  Goal: Verbalizes/displays adequate comfort level or baseline comfort level  8/3/2023 1702 by Nilo Sahu  Outcome: Progressing  Flowsheets (Taken 8/3/2023 0800)  Verbalizes/displays adequate comfort level or baseline comfort level:   Encourage patient to monitor pain and request assistance   Assess pain using appropriate pain scale   Administer analgesics based on type and severity of pain and evaluate response   Implement non-pharmacological measures as appropriate and evaluate response     Problem: Discharge Planning  Goal: Discharge to home or other facility with appropriate resources  8/3/2023 1702 by Mick Sahu  Outcome: Not Progressing  Flowsheets (Taken 8/3/2023 0800)  Discharge

## 2023-08-03 NOTE — PROGRESS NOTES
SVI CL HR TFC TPRI   Baseline 54 4.2 78 32.4 1533   Challenge 65 5.0 81 33.6 1292   % 20.5 17.2 3.0 3.5 -15.7    Patient fluid responsive

## 2023-08-04 LAB
ANION GAP SERPL CALCULATED.3IONS-SCNC: 11 MMOL/L (ref 7–16)
BASOPHILS # BLD: 0 K/UL (ref 0–0.2)
BASOPHILS # BLD: 0.07 K/UL (ref 0–0.2)
BASOPHILS NFR BLD: 0 % (ref 0–2)
BASOPHILS NFR BLD: 1 % (ref 0–2)
BUN SERPL-MCNC: 20 MG/DL (ref 6–23)
CALCIUM SERPL-MCNC: 8.4 MG/DL (ref 8.6–10.2)
CHLORIDE SERPL-SCNC: 114 MMOL/L (ref 98–107)
CO2 SERPL-SCNC: 17 MMOL/L (ref 22–29)
CREAT SERPL-MCNC: 1.7 MG/DL (ref 0.5–1)
DATE LAST DOSE: NORMAL
EOSINOPHIL # BLD: 0.23 K/UL (ref 0.05–0.5)
EOSINOPHIL # BLD: 0.38 K/UL (ref 0.05–0.5)
EOSINOPHILS RELATIVE PERCENT: 3 % (ref 0–6)
EOSINOPHILS RELATIVE PERCENT: 5 % (ref 0–6)
ERYTHROCYTE [DISTWIDTH] IN BLOOD BY AUTOMATED COUNT: 13 % (ref 11.5–15)
ERYTHROCYTE [DISTWIDTH] IN BLOOD BY AUTOMATED COUNT: 13.2 % (ref 11.5–15)
GFR SERPL CREATININE-BSD FRML MDRD: 32 ML/MIN/1.73M2
GLUCOSE SERPL-MCNC: 78 MG/DL (ref 74–99)
HCT VFR BLD AUTO: 30.8 % (ref 34–48)
HCT VFR BLD AUTO: 31.1 % (ref 34–48)
HGB BLD-MCNC: 8.8 G/DL (ref 11.5–15.5)
HGB BLD-MCNC: 8.9 G/DL (ref 11.5–15.5)
HIV 1+2 AB+HIV1 P24 AG SERPL QL IA: NONREACTIVE
IMM GRANULOCYTES # BLD AUTO: 0.1 K/UL (ref 0–0.58)
IMM GRANULOCYTES NFR BLD: 1 % (ref 0–5)
LYMPHOCYTES NFR BLD: 1.59 K/UL (ref 1.5–4)
LYMPHOCYTES NFR BLD: 2.22 K/UL (ref 1.5–4)
LYMPHOCYTES RELATIVE PERCENT: 19 % (ref 20–42)
LYMPHOCYTES RELATIVE PERCENT: 25 % (ref 20–42)
MAGNESIUM SERPL-MCNC: 2 MG/DL (ref 1.6–2.6)
MCH RBC QN AUTO: 30 PG (ref 26–35)
MCH RBC QN AUTO: 30.2 PG (ref 26–35)
MCHC RBC AUTO-ENTMCNC: 28.6 G/DL (ref 32–34.5)
MCHC RBC AUTO-ENTMCNC: 28.6 G/DL (ref 32–34.5)
MCV RBC AUTO: 104.7 FL (ref 80–99.9)
MCV RBC AUTO: 105.8 FL (ref 80–99.9)
MICROORGANISM SPEC CULT: NORMAL
MONOCYTES NFR BLD: 0.38 K/UL (ref 0.1–0.95)
MONOCYTES NFR BLD: 0.64 K/UL (ref 0.1–0.95)
MONOCYTES NFR BLD: 4 % (ref 2–12)
MONOCYTES NFR BLD: 8 % (ref 2–12)
NEUTROPHILS NFR BLD: 67 % (ref 43–80)
NEUTROPHILS NFR BLD: 68 % (ref 43–80)
NEUTS SEG NFR BLD: 5.59 K/UL (ref 1.8–7.3)
NEUTS SEG NFR BLD: 5.97 K/UL (ref 1.8–7.3)
PATH REV BLD -IMP: NORMAL
PHOSPHATE SERPL-MCNC: 3 MG/DL (ref 2.5–4.5)
PLATELET # BLD AUTO: 33 K/UL (ref 130–450)
PLATELET CONFIRMATION: NORMAL
PLATELET, FLUORESCENCE: 65 K/UL (ref 130–450)
PMV BLD AUTO: 11.3 FL (ref 7–12)
PMV BLD AUTO: 11.8 FL (ref 7–12)
POTASSIUM SERPL-SCNC: 4.4 MMOL/L (ref 3.5–5)
RBC # BLD AUTO: 2.91 M/UL (ref 3.5–5.5)
RBC # BLD AUTO: 2.97 M/UL (ref 3.5–5.5)
RBC # BLD: ABNORMAL 10*6/UL
REPORT STATUS: NORMAL
SODIUM SERPL-SCNC: 142 MMOL/L (ref 132–146)
SPECIMEN DESCRIPTION: NORMAL
TME LAST DOSE: NORMAL H
VANCOMYCIN DOSE: NORMAL MG
VANCOMYCIN SERPL-MCNC: 8.9 UG/ML (ref 5–40)
WBC OTHER # BLD: 8.4 K/UL (ref 4.5–11.5)
WBC OTHER # BLD: 8.8 K/UL (ref 4.5–11.5)

## 2023-08-04 PROCEDURE — 80202 ASSAY OF VANCOMYCIN: CPT

## 2023-08-04 PROCEDURE — 97530 THERAPEUTIC ACTIVITIES: CPT

## 2023-08-04 PROCEDURE — 6370000000 HC RX 637 (ALT 250 FOR IP)

## 2023-08-04 PROCEDURE — 85025 COMPLETE CBC W/AUTO DIFF WBC: CPT

## 2023-08-04 PROCEDURE — 2580000003 HC RX 258

## 2023-08-04 PROCEDURE — 80048 BASIC METABOLIC PNL TOTAL CA: CPT

## 2023-08-04 PROCEDURE — 51702 INSERT TEMP BLADDER CATH: CPT

## 2023-08-04 PROCEDURE — 6360000002 HC RX W HCPCS: Performed by: CHIROPRACTOR

## 2023-08-04 PROCEDURE — 99291 CRITICAL CARE FIRST HOUR: CPT | Performed by: INTERNAL MEDICINE

## 2023-08-04 PROCEDURE — 84100 ASSAY OF PHOSPHORUS: CPT

## 2023-08-04 PROCEDURE — 86340 INTRINSIC FACTOR ANTIBODY: CPT

## 2023-08-04 PROCEDURE — 83735 ASSAY OF MAGNESIUM: CPT

## 2023-08-04 PROCEDURE — 2000000000 HC ICU R&B

## 2023-08-04 PROCEDURE — 2580000003 HC RX 258: Performed by: CHIROPRACTOR

## 2023-08-04 PROCEDURE — 6370000000 HC RX 637 (ALT 250 FOR IP): Performed by: INTERNAL MEDICINE

## 2023-08-04 PROCEDURE — 6360000002 HC RX W HCPCS

## 2023-08-04 PROCEDURE — 2580000003 HC RX 258: Performed by: INTERNAL MEDICINE

## 2023-08-04 PROCEDURE — 6370000000 HC RX 637 (ALT 250 FOR IP): Performed by: CHIROPRACTOR

## 2023-08-04 PROCEDURE — 2580000003 HC RX 258: Performed by: STUDENT IN AN ORGANIZED HEALTH CARE EDUCATION/TRAINING PROGRAM

## 2023-08-04 RX ORDER — SIMETHICONE 80 MG
80 TABLET,CHEWABLE ORAL EVERY 6 HOURS PRN
Status: DISCONTINUED | OUTPATIENT
Start: 2023-08-04 | End: 2023-08-07 | Stop reason: HOSPADM

## 2023-08-04 RX ORDER — 0.9 % SODIUM CHLORIDE 0.9 %
1000 INTRAVENOUS SOLUTION INTRAVENOUS ONCE
Status: COMPLETED | OUTPATIENT
Start: 2023-08-04 | End: 2023-08-05

## 2023-08-04 RX ORDER — LANOLIN ALCOHOL/MO/W.PET/CERES
1000 CREAM (GRAM) TOPICAL DAILY
Status: DISCONTINUED | OUTPATIENT
Start: 2023-08-04 | End: 2023-08-07 | Stop reason: HOSPADM

## 2023-08-04 RX ADMIN — FOLIC ACID 1 MG: 1 TABLET ORAL at 08:58

## 2023-08-04 RX ADMIN — PRAVASTATIN SODIUM 40 MG: 20 TABLET ORAL at 08:58

## 2023-08-04 RX ADMIN — PIPERACILLIN AND TAZOBACTAM 4500 MG: 4; .5 INJECTION, POWDER, LYOPHILIZED, FOR SOLUTION INTRAVENOUS at 13:50

## 2023-08-04 RX ADMIN — Medication 2000 UNITS: at 08:58

## 2023-08-04 RX ADMIN — SODIUM CHLORIDE, PRESERVATIVE FREE 10 ML: 5 INJECTION INTRAVENOUS at 20:24

## 2023-08-04 RX ADMIN — SODIUM CHLORIDE, PRESERVATIVE FREE 10 ML: 5 INJECTION INTRAVENOUS at 08:58

## 2023-08-04 RX ADMIN — CYANOCOBALAMIN TAB 1000 MCG 1000 MCG: 1000 TAB at 14:54

## 2023-08-04 RX ADMIN — LEVOTHYROXINE SODIUM 125 MCG: 0.1 TABLET ORAL at 06:37

## 2023-08-04 RX ADMIN — SIMETHICONE 80 MG: 80 TABLET, CHEWABLE ORAL at 14:59

## 2023-08-04 RX ADMIN — PIPERACILLIN AND TAZOBACTAM 4500 MG: 4; .5 INJECTION, POWDER, LYOPHILIZED, FOR SOLUTION INTRAVENOUS at 20:23

## 2023-08-04 RX ADMIN — PIPERACILLIN AND TAZOBACTAM 4500 MG: 4; .5 INJECTION, POWDER, LYOPHILIZED, FOR SOLUTION INTRAVENOUS at 05:56

## 2023-08-04 RX ADMIN — SODIUM CHLORIDE 1000 ML: 9 INJECTION, SOLUTION INTRAVENOUS at 22:22

## 2023-08-04 ASSESSMENT — PAIN SCALES - GENERAL
PAINLEVEL_OUTOF10: 0

## 2023-08-04 NOTE — PROGRESS NOTES
[address]   Department of Internal Medicine   Internal Medicine Residency  MICU Progress Note    Patient:  Colton Donovan 80 y.o. female   MRN: [id number]       Date of Service: 8/3/2023    Allergy: Cortisone    CC: weakness, nausea/vomiting   Overnight events: No acute overnight events, continued on levo pressure support. Subjective     Pt seen and examined at bedside. She continues on 0.9 levo with a BP of 93/58, MAP 70, HR 87. Afebrile. SpO2 98% on room air. Pt is NPO with anticipation for IR guided aspiration of possible post hemilaminectomy abscess vs hematoma vs seroma. Pt is not currently experiencing any pain. She endorses b/l lower extremity numbness/tingling that has been present for longer than a month. Discussing the weakness she has felt in her legs post surgery, she does not feel as though it is a strength issue or with the numbness not feeling where her feet are in space, she states her legs just seem to give out on her because she was feeling weak/fatigued. States her left leg has been weaker in strength than the right for 'awhile'. Pt does not check her blood pressure at home but said her girlfriend check it when she started to feel weak and it was low. Pt takes lisinopril and norvasc at home and is medication compliant. She endorses decreased fluid and nutrition intake over the past few days due to nausea, vomiting, diarrhea, and subject fever/chills. She denies increase warmth, tenderness or drainage from hemilaminectomy site, denies increased pain, denies recent falls or trauma, no saddle anesthesia, bowel or bladder incontinence, recent illness, sick contacts, HA, CP, SOB, cough, increased sputum production, abdominal pain, burning with urination, blood in urine or stool. She does not use an assistive device to ambulate at home.      Addressing pt's hx in addition to H&P; the pt has a history of chronic back pain along with b/l knee replacements, has been seeing an Ortho

## 2023-08-04 NOTE — PROGRESS NOTES
[address] [place] [address] [place], [place]       Date:2023                                                               Patient Name: Jahaira Arango  MRN: [id number]  : 1955  Room: [address] [place] [address]    Evaluating OT: Burak Alexandra, OTD,  OTR/L; [id number]    Referring Provider: Denise Morrison MD   Specific Provider Orders/Date: OT eval and treat (23)       Diagnosis: Lactic acidosis [E87.20]  Shock ([address]) [R57.9]  Septicemia ([address]) [A41.9]  DARWIN (acute kidney injury) ([address]) [N17.9]     Reason for admission: Pt admitted with DARWIN, hypotension. Surgery/Procedures: None this admission     Recent:  23: L4-L5 hemilaminectomy, L4-L5 medial facetectomy, and L4 and L5 foraminotomy    Pertinent Medical History:    Past Medical History:   Diagnosis Date    H/O cardiovascular stress test 2021    Lexiscan    Hepatitis C     Hypertension     PE (pulmonary embolism) 07/01/2012    x2 in the past    PONV (postoperative nausea and vomiting)     Thyroid disease         *Precautions:  Fall Risk, spinal neutrality, monitor BP, NPO    Assessment of current deficits   [x] Functional mobility  [x]ADLs  [x] Strength               []Cognition   [x] Functional transfers   [x] IADLs         [x] Safety Awareness   [x]Endurance   [] Fine Coordination        [x] ROM     [] Vision/perception   []Sensation    []Gross Motor Coordination [x] Balance   [] Delirium                  []Motor Control     [] Communication    OT PLAN OF CARE   OT POC based on physician orders, patient diagnosis and results of clinical assessment.        Frequency/Duration: 1-3 days/wk for 1-2 weeks PRN    Specific OT Treatment Interventions to include:   * Instruction/training on adapted ADL techniques and AE recommendations to increase functional independence within precautions       * Training on energy conservation strategies, correct breathing pattern and owned: grab bar in shower, elevated toilet    Prior Level of Function: Ind with ADLs; Ind with IADLs. No AD for functional mobility. Driving: yes  Occupation: None stated    Pain Level: No c/o pain this date. Cognition: A&O: 4/4; Follows 2 step commands   Memory: G   Comprehension: G   Problem solving: G   Judgement/safety: G               Communication skills: WFL           Vision: WFL               Glasses:reading                                                   Hearing: WFL     RASS: 0  CAM-ICU: (NT) Delirium    UE Assessment:   Hand Dominance: Right [x]  Left []     ROM Strength STM goal: PRN   RUE  WFL WFL  : WFL   Increase overall strength for participation in ADLs. LUE WFL WFL  : WFL   Increase overall strength for participation in ADLs. Sensation: No c/o numbness/tingling in extremities.   Tone: WNL   Edema: BLE     Functional Assessment:  AM-PAC Daily Activity Raw Score: 16/24   Initial Eval Status  Date: 8/3/23 Treatment Status  Date: 8/4/23 STGs = LTGs  Time frame: 7-14 days   Feeding Ind Ind                            Grooming Min A overall SBA  To brush hair seated in chair- limited by BP, dizziness                       Ind        UB dressing/bathing Min A Min A                     Ind       LB dressing/bathing Mod A  Seated in chair  Mod A overall                     Ind        Toileting Mod A NT                     Ind     Bed Mobility  Supine to sit:   Min A    Sit to supine:   Min A Supine to sit:   SBA    Sit to supine:   NT  Pt seated in chair upon exit                       Ind     Functional Transfers Sit to stand:   Bed: Min A  Chair: Mod A    Stand to sit:   Min A    Stand Pivot:   Min A Sit to stand:   Min A    Stand to sit:   Min A    Stand Pivot:   Min A                     Ind     Functional Mobility Min A with FWW  For few steps from Eob <>chair Min A with FWW  For short distance in room  Limited by dizziness/BP                     Ind        Balance Sitting:

## 2023-08-04 NOTE — PLAN OF CARE
Problem: Discharge Planning  Goal: Discharge to home or other facility with appropriate resources  8/4/2023 1546 by Michael Sahu  Outcome: Not Progressing  Flowsheets (Taken 8/4/2023 0800)  Discharge to home or other facility with appropriate resources:   Identify barriers to discharge with patient and caregiver   Arrange for needed discharge resources and transportation as appropriate     Problem: Safety - Adult  Goal: Free from fall injury  8/4/2023 1546 by Michael Sahu  Outcome: Progressing     Problem: ABCDS Injury Assessment  Goal: Absence of physical injury  8/4/2023 1546 by Michael Sahu  Outcome: Progressing  Flowsheets (Taken 8/4/2023 0908)  Absence of Physical Injury: Implement safety measures based on patient assessment     Problem: Skin/Tissue Integrity  Goal: Absence of new skin breakdown  Description: 1. Monitor for areas of redness and/or skin breakdown  2. Assess vascular access sites hourly  3. Every 4-6 hours minimum:  Change oxygen saturation probe site  4. Every 4-6 hours:  If on nasal continuous positive airway pressure, respiratory therapy assess nares and determine need for appliance change or resting period.   8/4/2023 1546 by Gume Bautista  Outcome: Progressing     Problem: Pain  Goal: Verbalizes/displays adequate comfort level or baseline comfort level  8/4/2023 1546 by Garfield Sahu  Outcome: Progressing  Flowsheets (Taken 8/4/2023 0800)  Verbalizes/displays adequate comfort level or baseline comfort level:   Encourage patient to monitor pain and request assistance   Assess pain using appropriate pain scale   Administer analgesics based on type and severity of pain and evaluate response   Implement non-pharmacological measures as appropriate and evaluate response     Problem: Discharge Planning  Goal: Discharge to home or other facility with appropriate resources  8/4/2023 1546 by Michael Sahu  Outcome: Not Progressing  Flowsheets (Taken 8/4/2023 0800)  Discharge

## 2023-08-04 NOTE — PROGRESS NOTES
Pharmacy Consultation Note  (Antibiotic Dosing and Monitoring)    Initial consult date: 0802/2023  Consulting physician/provider: Lynn Luna MD  Drug: Vancomycin  Indication: Sepsis    Age/  Gender Height Weight IBW  Allergy Information   68 y.o./female 5' 7\" (170.2 cm) 241 lb (109.3 kg)     Ideal body weight: 61.6 kg (135 lb 12.9 oz)  Adjusted ideal body weight: 80.7 kg (177 lb 14.1 oz)   Cortisone      Renal Function:  Recent Labs     08/02/23  1252 08/03/23  0415   BUN 27* 29*   CREATININE 2.0* 2.4*         Intake/Output Summary (Last 24 hours) at 8/3/2023 2131  Last data filed at 8/3/2023 1600  Gross per 24 hour   Intake 2590.67 ml   Output 1075 ml   Net 1515.67 ml         Vancomycin Monitoring:  Trough:  No results for input(s): VANCOTROUGH in the last 72 hours. Random:  No results for input(s): VANCORANDOM in the last 72 hours. No results for input(s): Epimenio Bottoms in the last 72 hours. Historical Cultures:  No results found for: ORG  No results for input(s): BC in the last 72 hours. Vancomycin Administration Times:  Recent vancomycin administrations                     vancomycin (VANCOCIN) 2,250 mg in sodium chloride 0.9 % 500 mL IVPB (mg) 2,250 mg New Bag 08/02/23 1643                    Assessment:  Patient is a 80 y.o. female who has been initiated on vancomycin  Estimated Creatinine Clearance: 29 mL/min (A) (based on SCr of 2.4 mg/dL (H)).   To dose vancomycin, pharmacy will be utilizing dosing based off of levels because of patient's renal impairment/insufficiency    Plan:  Random tomorrow AM to assess dosing frequency    Munira Wright PharmD, BCPS, BCCCP 8/3/2023 9:31 PM

## 2023-08-04 NOTE — PROGRESS NOTES
Pharmacy Consultation Note  (Antibiotic Dosing and Monitoring)    Initial consult date: 0802/2023  Consulting physician/provider: Danie Hinojosa MD  Drug: Vancomycin  Indication: Sepsis    Vancomycin has been discontinued. Clinical pharmacy will sign off, please reconsult if further assistance is needed.      Abad Sneed PharmD, BCPS, BCCCP 8/4/2023 2:28 PM

## 2023-08-04 NOTE — PLAN OF CARE
Problem: Discharge Planning  Goal: Discharge to home or other facility with appropriate resources    Outcome: Not Progressing  Flowsheets (Taken 8/3/2023 0800)  Discharge to home or other facility with appropriate resources:   Identify barriers to discharge with patient and caregiver   Arrange for needed discharge resources and transportation as appropriate     Problem: Safety - Adult  Goal: Free from fall injury  8/4/2023 0644 by Destiny Chauhan RN  Outcome: Progressing       Problem: ABCDS Injury Assessment  Goal: Absence of physical injury  8/4/2023 0644 by Destiny Chauhan RN  Outcome: Progressing    Problem: Skin/Tissue Integrity  Goal: Absence of new skin breakdown  Description: 1. Monitor for areas of redness and/or skin breakdown  2. Assess vascular access sites hourly  3. Every 4-6 hours minimum:  Change oxygen saturation probe site  4. Every 4-6 hours:  If on nasal continuous positive airway pressure, respiratory therapy assess nares and determine need for appliance change or resting period.   8/4/2023 0644 by Destiny Chauhan RN  Outcome: Progressing       Problem: Pain  Goal: Verbalizes/displays adequate comfort level or baseline comfort level  8/4/2023 0644 by Destiny Chauhan RN  Outcome: Progressing     Encourage patient to monitor pain and request assistance   Assess pain using appropriate pain scale   Administer analgesics based on type and severity of pain and evaluate response   Implement non-pharmacological measures as appropriate and evaluate response

## 2023-08-04 NOTE — CARE COORDINATION
Care Coordination: LOS 2 day. Met with pt at bedside to discuss transition of care at discharge. Lives alone but son has been staying with her while he is building a home. She states she was completely independent pta. Brother lives near by as well. Family to transport home at discharge. [facility] Follows with NP Alice Panda. NO hx of hhc, dme or carlos. No preference of DME if needed, if hhc is needed would like [facility] for  Therapy and any skilled nursing if needed [phone]. I called intake, and faxed demos and clinica to [phone]. AddendumDaniella Del [facility] has accepted, will need updated information closer to discharge    Electronically signed by Gregg Camilo RN on 8/4/2023 at 10:16 AM     The Plan for Transition of Care is related to the following treatment goals: dc    The Patient was provided with a choice of provider and agrees   with the discharge plan. [x] Yes [] No    Freedom of choice list was provided with basic dialogue that supports the patient's individualized plan of care/goals, treatment preferences and shares the quality data associated with the providers.  [x] Yes [] No

## 2023-08-05 LAB
AFP SERPL-MCNC: 2.8 UG/L
ANION GAP SERPL CALCULATED.3IONS-SCNC: 11 MMOL/L (ref 7–16)
BASOPHILS # BLD: 0.06 K/UL (ref 0–0.2)
BASOPHILS NFR BLD: 1 % (ref 0–2)
BUN SERPL-MCNC: 13 MG/DL (ref 6–23)
CALCIUM SERPL-MCNC: 8.4 MG/DL (ref 8.6–10.2)
CHLORIDE SERPL-SCNC: 114 MMOL/L (ref 98–107)
CO2 SERPL-SCNC: 18 MMOL/L (ref 22–29)
CREAT SERPL-MCNC: 1.3 MG/DL (ref 0.5–1)
EOSINOPHIL # BLD: 0.41 K/UL (ref 0.05–0.5)
EOSINOPHILS RELATIVE PERCENT: 5 % (ref 0–6)
ERYTHROCYTE [DISTWIDTH] IN BLOOD BY AUTOMATED COUNT: 12.8 % (ref 11.5–15)
GFR SERPL CREATININE-BSD FRML MDRD: 44 ML/MIN/1.73M2
GLUCOSE SERPL-MCNC: 81 MG/DL (ref 74–99)
HCT VFR BLD AUTO: 28.4 % (ref 34–48)
HGB BLD-MCNC: 8.4 G/DL (ref 11.5–15.5)
IMM GRANULOCYTES # BLD AUTO: 0.11 K/UL (ref 0–0.58)
IMM GRANULOCYTES NFR BLD: 1 % (ref 0–5)
LYMPHOCYTES NFR BLD: 1.68 K/UL (ref 1.5–4)
LYMPHOCYTES RELATIVE PERCENT: 21 % (ref 20–42)
MAGNESIUM SERPL-MCNC: 1.9 MG/DL (ref 1.6–2.6)
MCH RBC QN AUTO: 30 PG (ref 26–35)
MCHC RBC AUTO-ENTMCNC: 29.6 G/DL (ref 32–34.5)
MCV RBC AUTO: 101.4 FL (ref 80–99.9)
MICROORGANISM SPEC CULT: NO GROWTH
MICROORGANISM/AGENT SPEC: ABNORMAL
MONOCYTES NFR BLD: 0.73 K/UL (ref 0.1–0.95)
MONOCYTES NFR BLD: 9 % (ref 2–12)
NEUTROPHILS NFR BLD: 62 % (ref 43–80)
NEUTS SEG NFR BLD: 4.94 K/UL (ref 1.8–7.3)
PHOSPHATE SERPL-MCNC: 2.6 MG/DL (ref 2.5–4.5)
PLATELET # BLD AUTO: 52 K/UL (ref 130–450)
PMV BLD AUTO: 11.7 FL (ref 7–12)
POTASSIUM SERPL-SCNC: 4.2 MMOL/L (ref 3.5–5)
RBC # BLD AUTO: 2.8 M/UL (ref 3.5–5.5)
SODIUM SERPL-SCNC: 143 MMOL/L (ref 132–146)
SPECIMEN DESCRIPTION: ABNORMAL
WBC OTHER # BLD: 7.9 K/UL (ref 4.5–11.5)

## 2023-08-05 PROCEDURE — 84100 ASSAY OF PHOSPHORUS: CPT

## 2023-08-05 PROCEDURE — 1200000000 HC SEMI PRIVATE

## 2023-08-05 PROCEDURE — 85025 COMPLETE CBC W/AUTO DIFF WBC: CPT

## 2023-08-05 PROCEDURE — 6360000002 HC RX W HCPCS: Performed by: CHIROPRACTOR

## 2023-08-05 PROCEDURE — 6370000000 HC RX 637 (ALT 250 FOR IP)

## 2023-08-05 PROCEDURE — 83735 ASSAY OF MAGNESIUM: CPT

## 2023-08-05 PROCEDURE — 6370000000 HC RX 637 (ALT 250 FOR IP): Performed by: INTERNAL MEDICINE

## 2023-08-05 PROCEDURE — 2580000003 HC RX 258: Performed by: CHIROPRACTOR

## 2023-08-05 PROCEDURE — 6370000000 HC RX 637 (ALT 250 FOR IP): Performed by: CHIROPRACTOR

## 2023-08-05 PROCEDURE — 80048 BASIC METABOLIC PNL TOTAL CA: CPT

## 2023-08-05 PROCEDURE — 99291 CRITICAL CARE FIRST HOUR: CPT | Performed by: INTERNAL MEDICINE

## 2023-08-05 PROCEDURE — 2580000003 HC RX 258: Performed by: INTERNAL MEDICINE

## 2023-08-05 RX ADMIN — Medication 2000 UNITS: at 08:30

## 2023-08-05 RX ADMIN — PRAVASTATIN SODIUM 40 MG: 20 TABLET ORAL at 08:30

## 2023-08-05 RX ADMIN — FOLIC ACID 1 MG: 1 TABLET ORAL at 08:30

## 2023-08-05 RX ADMIN — PIPERACILLIN AND TAZOBACTAM 4500 MG: 4; .5 INJECTION, POWDER, LYOPHILIZED, FOR SOLUTION INTRAVENOUS at 04:58

## 2023-08-05 RX ADMIN — SODIUM CHLORIDE, PRESERVATIVE FREE 10 ML: 5 INJECTION INTRAVENOUS at 08:30

## 2023-08-05 RX ADMIN — ACETAMINOPHEN 650 MG: 325 TABLET ORAL at 00:37

## 2023-08-05 RX ADMIN — CYANOCOBALAMIN TAB 1000 MCG 1000 MCG: 1000 TAB at 08:30

## 2023-08-05 RX ADMIN — SIMETHICONE 80 MG: 80 TABLET, CHEWABLE ORAL at 09:19

## 2023-08-05 ASSESSMENT — PAIN DESCRIPTION - LOCATION: LOCATION: HEAD

## 2023-08-05 ASSESSMENT — PAIN SCALES - WONG BAKER
WONGBAKER_NUMERICALRESPONSE: 0

## 2023-08-05 ASSESSMENT — PAIN SCALES - GENERAL
PAINLEVEL_OUTOF10: 0
PAINLEVEL_OUTOF10: 6
PAINLEVEL_OUTOF10: 0

## 2023-08-05 ASSESSMENT — PAIN - FUNCTIONAL ASSESSMENT: PAIN_FUNCTIONAL_ASSESSMENT: ACTIVITIES ARE NOT PREVENTED

## 2023-08-05 ASSESSMENT — PAIN DESCRIPTION - ORIENTATION: ORIENTATION: MID

## 2023-08-05 ASSESSMENT — PAIN DESCRIPTION - DESCRIPTORS: DESCRIPTORS: CRAMPING;DULL;DISCOMFORT

## 2023-08-05 NOTE — PROGRESS NOTES
Patient to be transferred out of the ICU. Patient has a bed assigned and is aware, she states that she will let her family know.

## 2023-08-05 NOTE — PROGRESS NOTES
4 Eyes Skin Assessment     NAME:  Ranjit Barajas  YOB: 1955  MEDICAL RECORD NUMBER:  [id number]    The patient is being assessed for  Transfer to New Unit    I agree that at least one RN has performed a thorough Head to Toe Skin Assessment on the patient. ALL assessment sites listed below have been assessed. Areas assessed by both nurses:    Head, Face, Ears, Shoulders, Back, Chest, Arms, Elbows, Hands, Sacrum. Buttock, Coccyx, Ischium, and Legs. Feet and Heels        Does the Patient have a Wound?  No noted wound(s)       Silvano Prevention initiated by RN: Yes  Wound Care Orders initiated by RN: No    Pressure Injury (Stage 3,4, Unstageable, DTI, NWPT, and Complex wounds) if present, place Wound referral order by RN under : No    New Ostomies, if present place, Ostomy referral order under : No     Nurse 1 eSignature: Electronically signed by Dafne Cristina RN on 8/5/23 at 1:08 PM EDT    **SHARE this note so that the co-signing nurse can place an eSignature**    Nurse 2 eSignature: Electronically signed by Rojelio Thorpe RN on 8/5/23 at 1:13 PM EDT

## 2023-08-05 NOTE — PLAN OF CARE
Problem: Discharge Planning  Goal: Discharge to home or other facility with appropriate resources  8/5/2023 0744 by Alexandra Mackenzie RN  Outcome: Progressing  Flowsheets (Taken 8/5/2023 0744)  Discharge to home or other facility with appropriate resources:   Identify barriers to discharge with patient and caregiver   Arrange for needed discharge resources and transportation as appropriate  8/5/2023 0057 by Juanito Coreas RN  Outcome: Progressing  Flowsheets (Taken 8/4/2023 2000)  Discharge to home or other facility with appropriate resources: Identify discharge learning needs (meds, wound care, etc)     Problem: Safety - Adult  Goal: Free from fall injury  8/5/2023 0744 by Alexandra Mackenzie RN  Outcome: Adequate for Discharge  Flowsheets (Taken 8/5/2023 0744)  Free From Fall Injury: Instruct family/caregiver on patient safety  8/5/2023 0057 by Juanito Coreas RN  Outcome: Progressing     Problem: ABCDS Injury Assessment  Goal: Absence of physical injury  8/5/2023 0744 by Alexandra Mackenzie RN  Outcome: Adequate for Discharge  Flowsheets (Taken 8/5/2023 0744)  Absence of Physical Injury: Implement safety measures based on patient assessment  8/5/2023 0057 by Juanito Coreas RN  Outcome: Progressing     Problem: Skin/Tissue Integrity  Goal: Absence of new skin breakdown  Description: 1. Monitor for areas of redness and/or skin breakdown  2. Assess vascular access sites hourly  3. Every 4-6 hours minimum:  Change oxygen saturation probe site  4. Every 4-6 hours:  If on nasal continuous positive airway pressure, respiratory therapy assess nares and determine need for appliance change or resting period.   8/5/2023 0744 by Alexandra Mackenzie RN  Outcome: Adequate for Discharge  8/5/2023 0057 by Juanito Croeas RN  Outcome: Progressing     Problem: Pain  Goal: Verbalizes/displays adequate comfort level or baseline comfort level  8/5/2023 0744 by Alexandra Mackenzie RN  Outcome: Adequate for Discharge  Flowsheets  Taken 8/5/2023 0744 by Alexandra Mackenzie

## 2023-08-05 NOTE — PLAN OF CARE
Problem: Discharge Planning  Goal: Discharge to home or other facility with appropriate resources  8/5/2023 0057 by Marilynn Connors RN  Outcome: Progressing  Flowsheets (Taken 8/4/2023 2000)  Discharge to home or other facility with appropriate resources: Identify discharge learning needs (meds, wound care, etc)  8/4/2023 1546 by Kelli Serrano  Outcome: Not Progressing  Flowsheets (Taken 8/4/2023 0800)  Discharge to home or other facility with appropriate resources:   Identify barriers to discharge with patient and caregiver   Arrange for needed discharge resources and transportation as appropriate     Problem: Safety - Adult  Goal: Free from fall injury  8/5/2023 0057 by Marilynn Connors RN  Outcome: Progressing  8/4/2023 1546 by Jamie Sahu  Outcome: Progressing     Problem: ABCDS Injury Assessment  Goal: Absence of physical injury  8/5/2023 0057 by Marilynn Connors RN  Outcome: Progressing  8/4/2023 1546 by Jamie Sahu  Outcome: Progressing  Flowsheets (Taken 8/4/2023 0908)  Absence of Physical Injury: Implement safety measures based on patient assessment     Problem: Skin/Tissue Integrity  Goal: Absence of new skin breakdown  Description: 1. Monitor for areas of redness and/or skin breakdown  2. Assess vascular access sites hourly  3. Every 4-6 hours minimum:  Change oxygen saturation probe site  4. Every 4-6 hours:  If on nasal continuous positive airway pressure, respiratory therapy assess nares and determine need for appliance change or resting period.   8/5/2023 0057 by Marilynn Connors RN  Outcome: Progressing  8/4/2023 1546 by Jamie Sahu  Outcome: Progressing     Problem: Pain  Goal: Verbalizes/displays adequate comfort level or baseline comfort level  8/5/2023 0057 by Marilynn Connors RN  Outcome: Progressing  8/4/2023 1546 by Jamie Sahu  Outcome: Progressing  Flowsheets (Taken 8/4/2023 0800)  Verbalizes/displays adequate comfort level or baseline comfort level:   Encourage patient to monitor pain and request assistance   Assess pain using appropriate pain scale   Administer analgesics based on type and severity of pain and evaluate response   Implement non-pharmacological measures as appropriate and evaluate response     Problem: Discharge Planning  Goal: Discharge to home or other facility with appropriate resources  8/5/2023 0057 by Costa Baires RN  Outcome: Progressing  Flowsheets (Taken 8/4/2023 2000)  Discharge to home or other facility with appropriate resources: Identify discharge learning needs (meds, wound care, etc)  8/4/2023 1546 by Tawnya Sahu  Outcome: Not Progressing  Flowsheets (Taken 8/4/2023 0800)  Discharge to home or other facility with appropriate resources:   Identify barriers to discharge with patient and caregiver   Arrange for needed discharge resources and transportation as appropriate

## 2023-08-06 LAB
ANION GAP SERPL CALCULATED.3IONS-SCNC: 13 MMOL/L (ref 7–16)
BASOPHILS # BLD: 0.05 K/UL (ref 0–0.2)
BASOPHILS NFR BLD: 1 % (ref 0–2)
BUN SERPL-MCNC: 9 MG/DL (ref 6–23)
CALCIUM SERPL-MCNC: 8.5 MG/DL (ref 8.6–10.2)
CHLORIDE SERPL-SCNC: 108 MMOL/L (ref 98–107)
CO2 SERPL-SCNC: 19 MMOL/L (ref 22–29)
CREAT SERPL-MCNC: 1 MG/DL (ref 0.5–1)
EOSINOPHIL # BLD: 0.34 K/UL (ref 0.05–0.5)
EOSINOPHILS RELATIVE PERCENT: 4 % (ref 0–6)
ERYTHROCYTE [DISTWIDTH] IN BLOOD BY AUTOMATED COUNT: 12.7 % (ref 11.5–15)
GFR SERPL CREATININE-BSD FRML MDRD: >60 ML/MIN/1.73M2
GLUCOSE SERPL-MCNC: 85 MG/DL (ref 74–99)
HCT VFR BLD AUTO: 30.6 % (ref 34–48)
HGB BLD-MCNC: 9.3 G/DL (ref 11.5–15.5)
IMM GRANULOCYTES # BLD AUTO: 0.11 K/UL (ref 0–0.58)
IMM GRANULOCYTES NFR BLD: 1 % (ref 0–5)
LYMPHOCYTES NFR BLD: 1.47 K/UL (ref 1.5–4)
LYMPHOCYTES RELATIVE PERCENT: 16 % (ref 20–42)
MAGNESIUM SERPL-MCNC: 1.8 MG/DL (ref 1.6–2.6)
MCH RBC QN AUTO: 30.5 PG (ref 26–35)
MCHC RBC AUTO-ENTMCNC: 30.4 G/DL (ref 32–34.5)
MCV RBC AUTO: 100.3 FL (ref 80–99.9)
MONOCYTES NFR BLD: 0.72 K/UL (ref 0.1–0.95)
MONOCYTES NFR BLD: 8 % (ref 2–12)
NEUTROPHILS NFR BLD: 72 % (ref 43–80)
NEUTS SEG NFR BLD: 6.76 K/UL (ref 1.8–7.3)
PHOSPHATE SERPL-MCNC: 2.6 MG/DL (ref 2.5–4.5)
PLATELET, FLUORESCENCE: 94 K/UL (ref 130–450)
PMV BLD AUTO: 11.1 FL (ref 7–12)
POTASSIUM SERPL-SCNC: 4 MMOL/L (ref 3.5–5)
RBC # BLD AUTO: 3.05 M/UL (ref 3.5–5.5)
SODIUM SERPL-SCNC: 140 MMOL/L (ref 132–146)
WBC OTHER # BLD: 9.5 K/UL (ref 4.5–11.5)

## 2023-08-06 PROCEDURE — 80048 BASIC METABOLIC PNL TOTAL CA: CPT

## 2023-08-06 PROCEDURE — 36415 COLL VENOUS BLD VENIPUNCTURE: CPT

## 2023-08-06 PROCEDURE — 1200000000 HC SEMI PRIVATE

## 2023-08-06 PROCEDURE — 6370000000 HC RX 637 (ALT 250 FOR IP): Performed by: CHIROPRACTOR

## 2023-08-06 PROCEDURE — 2580000003 HC RX 258: Performed by: CHIROPRACTOR

## 2023-08-06 PROCEDURE — 83735 ASSAY OF MAGNESIUM: CPT

## 2023-08-06 PROCEDURE — 84100 ASSAY OF PHOSPHORUS: CPT

## 2023-08-06 PROCEDURE — 85025 COMPLETE CBC W/AUTO DIFF WBC: CPT

## 2023-08-06 RX ADMIN — CYANOCOBALAMIN TAB 1000 MCG 1000 MCG: 1000 TAB at 09:30

## 2023-08-06 RX ADMIN — SODIUM CHLORIDE, PRESERVATIVE FREE 10 ML: 5 INJECTION INTRAVENOUS at 09:31

## 2023-08-06 RX ADMIN — SODIUM CHLORIDE, PRESERVATIVE FREE 10 ML: 5 INJECTION INTRAVENOUS at 20:03

## 2023-08-06 RX ADMIN — LEVOTHYROXINE SODIUM 125 MCG: 0.1 TABLET ORAL at 06:46

## 2023-08-06 RX ADMIN — Medication 2000 UNITS: at 09:31

## 2023-08-06 RX ADMIN — PRAVASTATIN SODIUM 40 MG: 20 TABLET ORAL at 09:30

## 2023-08-06 RX ADMIN — FOLIC ACID 1 MG: 1 TABLET ORAL at 09:30

## 2023-08-07 VITALS
HEART RATE: 87 BPM | WEIGHT: 257.1 LBS | HEIGHT: 67 IN | OXYGEN SATURATION: 97 % | DIASTOLIC BLOOD PRESSURE: 61 MMHG | SYSTOLIC BLOOD PRESSURE: 101 MMHG | RESPIRATION RATE: 16 BRPM | TEMPERATURE: 97.7 F | BODY MASS INDEX: 40.35 KG/M2

## 2023-08-07 LAB
ANION GAP SERPL CALCULATED.3IONS-SCNC: 11 MMOL/L (ref 7–16)
BASOPHILS # BLD: 0.06 K/UL (ref 0–0.2)
BASOPHILS NFR BLD: 1 % (ref 0–2)
BUN SERPL-MCNC: 7 MG/DL (ref 6–23)
CALCIUM SERPL-MCNC: 8.6 MG/DL (ref 8.6–10.2)
CHLORIDE SERPL-SCNC: 107 MMOL/L (ref 98–107)
CO2 SERPL-SCNC: 20 MMOL/L (ref 22–29)
CREAT SERPL-MCNC: 1.2 MG/DL (ref 0.5–1)
EOSINOPHIL # BLD: 0.43 K/UL (ref 0.05–0.5)
EOSINOPHILS RELATIVE PERCENT: 4 % (ref 0–6)
ERYTHROCYTE [DISTWIDTH] IN BLOOD BY AUTOMATED COUNT: 12.6 % (ref 11.5–15)
GFR SERPL CREATININE-BSD FRML MDRD: 52 ML/MIN/1.73M2
GLUCOSE SERPL-MCNC: 84 MG/DL (ref 74–99)
HCT VFR BLD AUTO: 31.9 % (ref 34–48)
HCV RNA SERPL NAA+PROBE-ACNC: NOT DETECTED IU/ML
HCV RNA SERPL NAA+PROBE-LOG IU: NOT DETECTED LOG IU/ML
HCV RNA SERPL QL NAA+PROBE: NOT DETECTED
HGB BLD-MCNC: 9.4 G/DL (ref 11.5–15.5)
IMM GRANULOCYTES # BLD AUTO: 0.11 K/UL (ref 0–0.58)
IMM GRANULOCYTES NFR BLD: 1 % (ref 0–5)
LYMPHOCYTES NFR BLD: 1.75 K/UL (ref 1.5–4)
LYMPHOCYTES RELATIVE PERCENT: 16 % (ref 20–42)
MAGNESIUM SERPL-MCNC: 1.9 MG/DL (ref 1.6–2.6)
MCH RBC QN AUTO: 30.1 PG (ref 26–35)
MCHC RBC AUTO-ENTMCNC: 29.5 G/DL (ref 32–34.5)
MCV RBC AUTO: 102.2 FL (ref 80–99.9)
MICROORGANISM SPEC CULT: NORMAL
MONOCYTES NFR BLD: 0.9 K/UL (ref 0.1–0.95)
MONOCYTES NFR BLD: 8 % (ref 2–12)
NEUTROPHILS NFR BLD: 71 % (ref 43–80)
NEUTS SEG NFR BLD: 7.79 K/UL (ref 1.8–7.3)
PHOSPHATE SERPL-MCNC: 3.2 MG/DL (ref 2.5–4.5)
PLATELET, FLUORESCENCE: 107 K/UL (ref 130–450)
PMV BLD AUTO: 11.2 FL (ref 7–12)
POTASSIUM SERPL-SCNC: 4.2 MMOL/L (ref 3.5–5)
RBC # BLD AUTO: 3.12 M/UL (ref 3.5–5.5)
SERVICE CMNT-IMP: NORMAL
SODIUM SERPL-SCNC: 138 MMOL/L (ref 132–146)
SPECIMEN DESCRIPTION: NORMAL
WBC OTHER # BLD: 11 K/UL (ref 4.5–11.5)

## 2023-08-07 PROCEDURE — 6370000000 HC RX 637 (ALT 250 FOR IP): Performed by: CHIROPRACTOR

## 2023-08-07 PROCEDURE — 83735 ASSAY OF MAGNESIUM: CPT

## 2023-08-07 PROCEDURE — 36415 COLL VENOUS BLD VENIPUNCTURE: CPT

## 2023-08-07 PROCEDURE — 80048 BASIC METABOLIC PNL TOTAL CA: CPT

## 2023-08-07 PROCEDURE — 2580000003 HC RX 258: Performed by: CHIROPRACTOR

## 2023-08-07 PROCEDURE — 85025 COMPLETE CBC W/AUTO DIFF WBC: CPT

## 2023-08-07 PROCEDURE — 84100 ASSAY OF PHOSPHORUS: CPT

## 2023-08-07 RX ORDER — ONDANSETRON 4 MG/1
4 TABLET, FILM COATED ORAL EVERY 8 HOURS PRN
Qty: 20 TABLET | Refills: 0 | Status: SHIPPED | OUTPATIENT
Start: 2023-08-07

## 2023-08-07 RX ORDER — CHOLECALCIFEROL (VITAMIN D3) 50 MCG
2000 TABLET ORAL DAILY
Qty: 30 TABLET | Refills: 3 | Status: SHIPPED | OUTPATIENT
Start: 2023-08-08

## 2023-08-07 RX ORDER — FOLIC ACID 1 MG/1
1 TABLET ORAL DAILY
Qty: 30 TABLET | Refills: 3 | Status: SHIPPED | OUTPATIENT
Start: 2023-08-08

## 2023-08-07 RX ADMIN — PRAVASTATIN SODIUM 40 MG: 20 TABLET ORAL at 07:50

## 2023-08-07 RX ADMIN — SODIUM CHLORIDE, PRESERVATIVE FREE 10 ML: 5 INJECTION INTRAVENOUS at 07:50

## 2023-08-07 RX ADMIN — LEVOTHYROXINE SODIUM 125 MCG: 0.1 TABLET ORAL at 05:47

## 2023-08-07 RX ADMIN — Medication 2000 UNITS: at 07:50

## 2023-08-07 RX ADMIN — FOLIC ACID 1 MG: 1 TABLET ORAL at 07:50

## 2023-08-07 RX ADMIN — CYANOCOBALAMIN TAB 1000 MCG 1000 MCG: 1000 TAB at 07:50

## 2023-08-07 NOTE — CARE COORDINATION
8/7. Discharge plan is home with [facility] home health care when medically stable.  Maikel Calix RN

## 2023-08-07 NOTE — DISCHARGE INSTRUCTIONS
You need to follow up with PCP within 1 week of discharge. Your platelet levels were found to be low which has been improving, script for repeat CBC will be given on discharge. You were diagnosed with DARWIN on admission, your Lisinopril medication is held on discharge, script for BMP is given to monitor your renal function, you will need to follow up with your PCP regarding restarting lisinopril. Your Folate and vitamin B12 were low, script for both medications will be provided. Follow up with neurosurgery as recommended.

## 2023-08-08 LAB
MICROORGANISM SPEC CULT: NORMAL
SPECIMEN DESCRIPTION: NORMAL

## 2023-08-09 LAB
ALBUMIN SERPL-MCNC: 3.7 G/DL (ref 3.5–5.2)
ALP SERPL-CCNC: 73 U/L (ref 35–104)
ALT SERPL-CCNC: 16 U/L (ref 0–32)
ANION GAP SERPL CALCULATED.3IONS-SCNC: 13 MMOL/L (ref 7–16)
AST SERPL-CCNC: 20 U/L (ref 0–31)
BASOPHILS # BLD: 0.06 K/UL (ref 0–0.2)
BASOPHILS NFR BLD: 0 % (ref 0–2)
BILIRUB SERPL-MCNC: 0.4 MG/DL (ref 0–1.2)
BUN SERPL-MCNC: 11 MG/DL (ref 6–23)
CALCIUM SERPL-MCNC: 9 MG/DL (ref 8.6–10.2)
CHLORIDE SERPL-SCNC: 105 MMOL/L (ref 98–107)
CO2 SERPL-SCNC: 22 MMOL/L (ref 22–29)
CREAT SERPL-MCNC: 1.3 MG/DL (ref 0.5–1)
EOSINOPHIL # BLD: 0.42 K/UL (ref 0.05–0.5)
EOSINOPHILS RELATIVE PERCENT: 3 % (ref 0–6)
ERYTHROCYTE [DISTWIDTH] IN BLOOD BY AUTOMATED COUNT: 12.8 % (ref 11.5–15)
GFR SERPL CREATININE-BSD FRML MDRD: 46 ML/MIN/1.73M2
GLUCOSE SERPL-MCNC: 109 MG/DL (ref 74–99)
HCT VFR BLD AUTO: 32.5 % (ref 34–48)
HGB BLD-MCNC: 9.7 G/DL (ref 11.5–15.5)
IF BLOCK AB SER QL RIA: NEGATIVE
IMM GRANULOCYTES # BLD AUTO: 0.1 K/UL (ref 0–0.58)
IMM GRANULOCYTES NFR BLD: 1 % (ref 0–5)
LYMPHOCYTES NFR BLD: 1.22 K/UL (ref 1.5–4)
LYMPHOCYTES RELATIVE PERCENT: 8 % (ref 20–42)
MCH RBC QN AUTO: 29.9 PG (ref 26–35)
MCHC RBC AUTO-ENTMCNC: 29.8 G/DL (ref 32–34.5)
MCV RBC AUTO: 100.3 FL (ref 80–99.9)
MONOCYTES NFR BLD: 0.94 K/UL (ref 0.1–0.95)
MONOCYTES NFR BLD: 6 % (ref 2–12)
NEUTROPHILS NFR BLD: 81 % (ref 43–80)
NEUTS SEG NFR BLD: 11.84 K/UL (ref 1.8–7.3)
PLATELET, FLUORESCENCE: 110 K/UL (ref 130–450)
PMV BLD AUTO: 11.5 FL (ref 7–12)
POTASSIUM SERPL-SCNC: 4.1 MMOL/L (ref 3.5–5)
PROT SERPL-MCNC: 7.1 G/DL (ref 6.4–8.3)
RBC # BLD AUTO: 3.24 M/UL (ref 3.5–5.5)
SODIUM SERPL-SCNC: 140 MMOL/L (ref 132–146)
WBC OTHER # BLD: 14.6 K/UL (ref 4.5–11.5)

## 2023-08-10 ENCOUNTER — APPOINTMENT (OUTPATIENT)
Dept: ULTRASOUND IMAGING | Age: 68
End: 2023-08-10
Payer: MEDICARE

## 2023-08-10 ENCOUNTER — APPOINTMENT (OUTPATIENT)
Dept: CT IMAGING | Age: 68
End: 2023-08-10
Attending: EMERGENCY MEDICINE
Payer: MEDICARE

## 2023-08-10 ENCOUNTER — HOSPITAL ENCOUNTER (INPATIENT)
Age: 68
LOS: 7 days | Discharge: HOME | End: 2023-08-17
Attending: EMERGENCY MEDICINE | Admitting: STUDENT IN AN ORGANIZED HEALTH CARE EDUCATION/TRAINING PROGRAM
Payer: MEDICARE

## 2023-08-10 DIAGNOSIS — I82.4Y2 ACUTE DEEP VEIN THROMBOSIS (DVT) OF PROXIMAL VEIN OF LEFT LOWER EXTREMITY (HCC): ICD-10-CM

## 2023-08-10 DIAGNOSIS — I82.220 ACUTE DEEP VEIN THROMBOSIS (DVT) OF INFERIOR VENA CAVA (HCC): ICD-10-CM

## 2023-08-10 DIAGNOSIS — I26.99 ACUTE PULMONARY EMBOLISM WITHOUT ACUTE COR PULMONALE, UNSPECIFIED PULMONARY EMBOLISM TYPE (HCC): Primary | ICD-10-CM

## 2023-08-10 LAB
ALBUMIN SERPL-MCNC: 3.4 G/DL (ref 3.5–5.2)
ALP SERPL-CCNC: 67 U/L (ref 35–104)
ALT SERPL-CCNC: 18 U/L (ref 0–32)
ANION GAP SERPL CALCULATED.3IONS-SCNC: 13 MMOL/L (ref 7–16)
AST SERPL-CCNC: 24 U/L (ref 0–31)
BASOPHILS # BLD: 0.06 K/UL (ref 0–0.2)
BASOPHILS NFR BLD: 0 % (ref 0–2)
BILIRUB SERPL-MCNC: 0.5 MG/DL (ref 0–1.2)
BUN SERPL-MCNC: 11 MG/DL (ref 6–23)
CALCIUM SERPL-MCNC: 8.3 MG/DL (ref 8.6–10.2)
CHLORIDE SERPL-SCNC: 103 MMOL/L (ref 98–107)
CO2 SERPL-SCNC: 21 MMOL/L (ref 22–29)
CREAT SERPL-MCNC: 1.2 MG/DL (ref 0.5–1)
EKG ATRIAL RATE: 91 BPM
EKG P AXIS: 65 DEGREES
EKG P-R INTERVAL: 150 MS
EKG Q-T INTERVAL: 368 MS
EKG QRS DURATION: 88 MS
EKG QTC CALCULATION (BAZETT): 452 MS
EKG R AXIS: 52 DEGREES
EKG T AXIS: 55 DEGREES
EKG VENTRICULAR RATE: 91 BPM
EOSINOPHIL # BLD: 0.31 K/UL (ref 0.05–0.5)
EOSINOPHILS RELATIVE PERCENT: 2 % (ref 0–6)
ERYTHROCYTE [DISTWIDTH] IN BLOOD BY AUTOMATED COUNT: 12.9 % (ref 11.5–15)
ERYTHROCYTE [DISTWIDTH] IN BLOOD BY AUTOMATED COUNT: 12.9 % (ref 11.5–15)
GFR SERPL CREATININE-BSD FRML MDRD: 50 ML/MIN/1.73M2
GLUCOSE SERPL-MCNC: 77 MG/DL (ref 74–99)
HCT VFR BLD AUTO: 30.6 % (ref 34–48)
HCT VFR BLD AUTO: 30.9 % (ref 34–48)
HGB BLD-MCNC: 9.2 G/DL (ref 11.5–15.5)
HGB BLD-MCNC: 9.3 G/DL (ref 11.5–15.5)
IMM GRANULOCYTES # BLD AUTO: 0.15 K/UL (ref 0–0.58)
IMM GRANULOCYTES NFR BLD: 1 % (ref 0–5)
LYMPHOCYTES NFR BLD: 1.25 K/UL (ref 1.5–4)
LYMPHOCYTES RELATIVE PERCENT: 9 % (ref 20–42)
MCH RBC QN AUTO: 30.2 PG (ref 26–35)
MCH RBC QN AUTO: 30.5 PG (ref 26–35)
MCHC RBC AUTO-ENTMCNC: 30.1 G/DL (ref 32–34.5)
MCHC RBC AUTO-ENTMCNC: 30.1 G/DL (ref 32–34.5)
MCV RBC AUTO: 100.3 FL (ref 80–99.9)
MCV RBC AUTO: 101.3 FL (ref 80–99.9)
MONOCYTES NFR BLD: 1.22 K/UL (ref 0.1–0.95)
MONOCYTES NFR BLD: 8 % (ref 2–12)
NEUTROPHILS NFR BLD: 79 % (ref 43–80)
NEUTS SEG NFR BLD: 11.46 K/UL (ref 1.8–7.3)
PARTIAL THROMBOPLASTIN TIME: 24.9 SEC (ref 24.5–35.1)
PLATELET # BLD AUTO: 170 K/UL (ref 130–450)
PLATELET # BLD AUTO: 177 K/UL (ref 130–450)
PMV BLD AUTO: 10.9 FL (ref 7–12)
PMV BLD AUTO: 11.6 FL (ref 7–12)
POTASSIUM SERPL-SCNC: 4 MMOL/L (ref 3.5–5)
PROT SERPL-MCNC: 6.7 G/DL (ref 6.4–8.3)
RBC # BLD AUTO: 3.05 M/UL (ref 3.5–5.5)
RBC # BLD AUTO: 3.05 M/UL (ref 3.5–5.5)
SODIUM SERPL-SCNC: 137 MMOL/L (ref 132–146)
TROPONIN I SERPL HS-MCNC: 9 NG/L (ref 0–9)
WBC OTHER # BLD: 14.5 K/UL (ref 4.5–11.5)
WBC OTHER # BLD: 14.9 K/UL (ref 4.5–11.5)

## 2023-08-10 PROCEDURE — 6360000002 HC RX W HCPCS: Performed by: EMERGENCY MEDICINE

## 2023-08-10 PROCEDURE — 2580000003 HC RX 258: Performed by: FAMILY MEDICINE

## 2023-08-10 PROCEDURE — 2140000000 HC CCU INTERMEDIATE R&B

## 2023-08-10 PROCEDURE — 85027 COMPLETE CBC AUTOMATED: CPT

## 2023-08-10 PROCEDURE — 6360000002 HC RX W HCPCS: Performed by: FAMILY MEDICINE

## 2023-08-10 PROCEDURE — 93971 EXTREMITY STUDY: CPT

## 2023-08-10 PROCEDURE — 84484 ASSAY OF TROPONIN QUANT: CPT

## 2023-08-10 PROCEDURE — 96374 THER/PROPH/DIAG INJ IV PUSH: CPT

## 2023-08-10 PROCEDURE — 85025 COMPLETE CBC W/AUTO DIFF WBC: CPT

## 2023-08-10 PROCEDURE — 85730 THROMBOPLASTIN TIME PARTIAL: CPT

## 2023-08-10 PROCEDURE — 93010 ELECTROCARDIOGRAM REPORT: CPT | Performed by: INTERNAL MEDICINE

## 2023-08-10 PROCEDURE — 99285 EMERGENCY DEPT VISIT HI MDM: CPT

## 2023-08-10 PROCEDURE — 71275 CT ANGIOGRAPHY CHEST: CPT

## 2023-08-10 PROCEDURE — 6360000004 HC RX CONTRAST MEDICATION: Performed by: RADIOLOGY

## 2023-08-10 PROCEDURE — 93005 ELECTROCARDIOGRAM TRACING: CPT | Performed by: EMERGENCY MEDICINE

## 2023-08-10 PROCEDURE — 80053 COMPREHEN METABOLIC PANEL: CPT

## 2023-08-10 RX ORDER — SODIUM CHLORIDE 0.9 % (FLUSH) 0.9 %
10 SYRINGE (ML) INJECTION PRN
Status: DISCONTINUED | OUTPATIENT
Start: 2023-08-10 | End: 2023-08-17 | Stop reason: HOSPADM

## 2023-08-10 RX ORDER — PROMETHAZINE HYDROCHLORIDE 12.5 MG/1
12.5 TABLET ORAL EVERY 6 HOURS PRN
Status: DISCONTINUED | OUTPATIENT
Start: 2023-08-10 | End: 2023-08-17 | Stop reason: HOSPADM

## 2023-08-10 RX ORDER — LANOLIN ALCOHOL/MO/W.PET/CERES
1000 CREAM (GRAM) TOPICAL DAILY
Status: DISCONTINUED | OUTPATIENT
Start: 2023-08-11 | End: 2023-08-17 | Stop reason: HOSPADM

## 2023-08-10 RX ORDER — HEPARIN SODIUM 1000 [USP'U]/ML
80 INJECTION, SOLUTION INTRAVENOUS; SUBCUTANEOUS ONCE
Status: COMPLETED | OUTPATIENT
Start: 2023-08-10 | End: 2023-08-10

## 2023-08-10 RX ORDER — FOLIC ACID 1 MG/1
1 TABLET ORAL DAILY
Status: DISCONTINUED | OUTPATIENT
Start: 2023-08-11 | End: 2023-08-17 | Stop reason: HOSPADM

## 2023-08-10 RX ORDER — AMLODIPINE BESYLATE 5 MG/1
5 TABLET ORAL DAILY
Status: DISCONTINUED | OUTPATIENT
Start: 2023-08-11 | End: 2023-08-17 | Stop reason: HOSPADM

## 2023-08-10 RX ORDER — ONDANSETRON 2 MG/ML
4 INJECTION INTRAMUSCULAR; INTRAVENOUS EVERY 6 HOURS PRN
Status: DISCONTINUED | OUTPATIENT
Start: 2023-08-10 | End: 2023-08-14

## 2023-08-10 RX ORDER — POLYETHYLENE GLYCOL 3350 17 G/17G
17 POWDER, FOR SOLUTION ORAL DAILY PRN
Status: DISCONTINUED | OUTPATIENT
Start: 2023-08-10 | End: 2023-08-17 | Stop reason: HOSPADM

## 2023-08-10 RX ORDER — SODIUM CHLORIDE 0.9 % (FLUSH) 0.9 %
10 SYRINGE (ML) INJECTION EVERY 12 HOURS SCHEDULED
Status: DISCONTINUED | OUTPATIENT
Start: 2023-08-10 | End: 2023-08-17 | Stop reason: HOSPADM

## 2023-08-10 RX ORDER — SODIUM CHLORIDE 9 MG/ML
INJECTION, SOLUTION INTRAVENOUS PRN
Status: DISCONTINUED | OUTPATIENT
Start: 2023-08-10 | End: 2023-08-17 | Stop reason: HOSPADM

## 2023-08-10 RX ORDER — PRAVASTATIN SODIUM 20 MG
40 TABLET ORAL DAILY
Status: DISCONTINUED | OUTPATIENT
Start: 2023-08-11 | End: 2023-08-17 | Stop reason: HOSPADM

## 2023-08-10 RX ORDER — ACETAMINOPHEN 325 MG/1
650 TABLET ORAL EVERY 6 HOURS PRN
Status: DISCONTINUED | OUTPATIENT
Start: 2023-08-10 | End: 2023-08-17 | Stop reason: HOSPADM

## 2023-08-10 RX ORDER — HEPARIN SODIUM 10000 [USP'U]/100ML
5-30 INJECTION, SOLUTION INTRAVENOUS CONTINUOUS
Status: DISCONTINUED | OUTPATIENT
Start: 2023-08-10 | End: 2023-08-14

## 2023-08-10 RX ORDER — LEVOTHYROXINE SODIUM 0.12 MG/1
125 TABLET ORAL DAILY
Status: DISCONTINUED | OUTPATIENT
Start: 2023-08-11 | End: 2023-08-17 | Stop reason: HOSPADM

## 2023-08-10 RX ORDER — CHOLECALCIFEROL (VITAMIN D3) 50 MCG
2000 TABLET ORAL DAILY
Status: DISCONTINUED | OUTPATIENT
Start: 2023-08-11 | End: 2023-08-17 | Stop reason: HOSPADM

## 2023-08-10 RX ORDER — FENTANYL CITRATE 50 UG/ML
25 INJECTION, SOLUTION INTRAMUSCULAR; INTRAVENOUS ONCE
Status: COMPLETED | OUTPATIENT
Start: 2023-08-10 | End: 2023-08-10

## 2023-08-10 RX ORDER — ACETAMINOPHEN 650 MG/1
650 SUPPOSITORY RECTAL EVERY 6 HOURS PRN
Status: DISCONTINUED | OUTPATIENT
Start: 2023-08-10 | End: 2023-08-17 | Stop reason: HOSPADM

## 2023-08-10 RX ORDER — HEPARIN SODIUM 1000 [USP'U]/ML
80 INJECTION, SOLUTION INTRAVENOUS; SUBCUTANEOUS PRN
Status: DISCONTINUED | OUTPATIENT
Start: 2023-08-10 | End: 2023-08-14

## 2023-08-10 RX ORDER — FENTANYL CITRATE 50 UG/ML
50 INJECTION, SOLUTION INTRAMUSCULAR; INTRAVENOUS
Status: DISCONTINUED | OUTPATIENT
Start: 2023-08-10 | End: 2023-08-15

## 2023-08-10 RX ORDER — HEPARIN SODIUM 1000 [USP'U]/ML
40 INJECTION, SOLUTION INTRAVENOUS; SUBCUTANEOUS PRN
Status: DISCONTINUED | OUTPATIENT
Start: 2023-08-10 | End: 2023-08-14

## 2023-08-10 RX ADMIN — ONDANSETRON 4 MG: 2 INJECTION INTRAMUSCULAR; INTRAVENOUS at 22:05

## 2023-08-10 RX ADMIN — IOPAMIDOL 75 ML: 755 INJECTION, SOLUTION INTRAVENOUS at 17:22

## 2023-08-10 RX ADMIN — HEPARIN SODIUM 9330 UNITS: 1000 INJECTION INTRAVENOUS; SUBCUTANEOUS at 20:02

## 2023-08-10 RX ADMIN — HEPARIN SODIUM 18 UNITS/KG/HR: 10000 INJECTION, SOLUTION INTRAVENOUS at 19:43

## 2023-08-10 RX ADMIN — FENTANYL CITRATE 25 MCG: 50 INJECTION INTRAMUSCULAR; INTRAVENOUS at 14:24

## 2023-08-10 RX ADMIN — SODIUM CHLORIDE, PRESERVATIVE FREE 10 ML: 5 INJECTION INTRAVENOUS at 22:05

## 2023-08-10 ASSESSMENT — ENCOUNTER SYMPTOMS
ABDOMINAL PAIN: 0
EYE REDNESS: 0
VOMITING: 0
NAUSEA: 0
SHORTNESS OF BREATH: 1

## 2023-08-10 ASSESSMENT — PAIN SCALES - GENERAL
PAINLEVEL_OUTOF10: 0
PAINLEVEL_OUTOF10: 8

## 2023-08-10 ASSESSMENT — PAIN DESCRIPTION - LOCATION: LOCATION: LEG

## 2023-08-10 ASSESSMENT — PAIN DESCRIPTION - ORIENTATION: ORIENTATION: LEFT

## 2023-08-10 ASSESSMENT — PAIN - FUNCTIONAL ASSESSMENT: PAIN_FUNCTIONAL_ASSESSMENT: 0-10

## 2023-08-10 NOTE — PROGRESS NOTES
Radiology Procedure Waiver   Name: Heather Law  : 1955  MRN: [id number]    Date:  8/10/23    Time: 3:05 PM EDT    Benefits of immediately proceeding with Radiology exam(s) without pre-testing outweigh the risks or are not indicated as specified below and therefore the following is/are being waived:    [] Pregnancy test   [] Patients LMP on-time and regular.   [] Patient had Tubal Ligation or has other Contraception Device. [] Patient  is Menopausal or Premenarcheal.    [] Patient had Full or Partial Hysterectomy. [] Protocol for Iodine allergy    [] MRI Questionnaire     [x] BUN/Creatinine   [] Patient age w/no hx of renal dysfunction. [] Patient on Dialysis. [] Recent Normal Labs.   Electronically signed by An Rizvi DO on 8/10/23 at 3:05 PM EDT

## 2023-08-10 NOTE — H&P
Hospitalist History & Physical      PCP: MELANIE Samuel - [facility]    Date of Service: Pt seen/examined on 8/10/2023     Chief Complaint:  had concerns including Leg Pain (Left leg pain and swelling, has been off of thinner since back surgery on 27th). History Of Present Illness:    Ms. Yissel Christine, a 80y.o. year old female  who  has a past medical history of H/O cardiovascular stress test, Hepatitis C, Hypertension, PE (pulmonary embolism), PONV (postoperative nausea and vomiting), and Thyroid disease. Patient presented to the emergency department with shortness of breath and left leg pain and swelling. Patient has a history of blood clot. Has been on Xarelto. Has been off Xarelto since back surgery on the 27th. Vital signs within normal limits and stable. The patient is afebrile. Laboratory studies notable for a WBC of 14.5 and a hemoglobin of 9.3. CT chest shows small left lower lobe pulmonary emboli. Ultrasound of the left lower extremity shows extensive occlusive DVT. Patient was started on heparin infusion. Medicine consulted for admission. Past Medical History:   Diagnosis Date    H/O cardiovascular stress test 05/04/2021    Lexiscan    Hepatitis C     Hypertension     PE (pulmonary embolism) 07/01/2012    x2 in the past    PONV (postoperative nausea and vomiting)     Thyroid disease        Past Surgical History:   Procedure Laterality Date    APPENDECTOMY      CT ASP ABS HEMATOMA BULLA CYST  8/3/2023    CT ASP ABS HEMATOMA BULLA CYST 8/3/2023 Jose Angel Nixon MD [facility]    HYSTERECTOMY (CERVIX STATUS UNKNOWN)      LAMINECTOMY N/A 7/27/2023    Left L4-L5 vidya-laminectomy performed by Fadi Muro MD at [address] [facility] Right     rotator cuff repair    TOTAL KNEE ARTHROPLASTY Left     [facility]         Prior to Admission medications    Medication Sig Start Date End Date Taking?  Authorizing Provider   folic acid (FOLVITE) 1 MG tablet Take 1 dissection. No acute abnormality of the aorta. There is mild atherosclerotic disease seen within the thoracic aorta. Some vascular calcifications seen in the origin the great vessels. The coronary arteries are with minimal atherosclerotic disease. Mediastinum: No evidence of mediastinal lymphadenopathy. Cardiac chambers are within normal limits. No pericardial effusion. No bulky hilar or axillary lymphadenopathy. The heart and pericardium demonstrate no acute abnormality. Lungs/Pleura: The lungs are without acute process. Dependent atelectatic changes identified lung bases bilaterally. No parenchymal consolidation, pulmonary mass or nodular density. No pleural effusion or pneumothorax. No focal consolidation or pulmonary edema. No evidence of pleural effusion or pneumothorax. Soft Tissues/Bones: No acute bone or soft tissue abnormality. Minimal multilevel degenerative changes identified diffusely throughout the spine. CTA ABDOMEN: Abdominal aorta/Branches: The abdominal aorta is normal in caliber. Minimal atherosclerotic disease seen within the abdominal aorta and iliac vessels. No dissection or intimal flap. IVC filters in satisfactory position. Organs: The liver is homogeneous in appearance. The gallbladder is been surgically removed. No intrahepatic or extrahepatic biliary ductal dilatation. Pancreas is homogeneous. Spleen is unremarkable. Both adrenal glands are within normal limits. Small cyst identified in the kidneys bilaterally. No stones or distension seen in the renal collecting system. GI/Bowel: The stomach is unremarkable in appearance. No wall thickening. The small bowel is within normal limits. No mucosal abnormality. Stool seen scattered diffusely throughout the colon. No signs of obvious obstruction or obstructing lesion. Peritoneum/Retroperitoneum: No abdominal retroperitoneal lymphadenopathy. No free fluid or free air. No abnormal mass or fluid collections identified. decision maker confirmed with patient:   Extended Emergency Contact Information  Primary Emergency Contact: [facility] Phone: [phone]  Mobile Phone: [phone]  Relation: Child   needed? No  Secondary Emergency Contact: [address] [place] [address] Road Phone: [phone]  Mobile Phone: [phone]  Relation: Child   needed? No    DVT Prophylaxis: []Lovenox []Heparin []PCD [] [address] []Encouraged ambulation  Disposition: []Med/Surg [] Intermediate [] ICU/CCU  Admit status: [] Observation [] Inpatient     +++++++++++++++++++++++++++++++++++++++++++++++++  Erica Scalesle, DO  +++++++++++++++++++++++++++++++++++++++++++++++++  NOTE: This report was transcribed using voice recognition software. Every effort was made to ensure accuracy; however, inadvertent computerized transcription errors may be present.

## 2023-08-10 NOTE — ED PROVIDER NOTES
[address]        Pt Name: Tennille Alcantar  MRN: [id number]  [facility] 1955  Date of evaluation: 8/10/2023  Provider: Nora Wayne DO  PCP: MELANIE Best NP  Note Started: 12:58 PM EDT 8/10/23    CHIEF COMPLAINT       Chief Complaint   Patient presents with    Leg Pain     Left leg pain and swelling, has been off of thinner since back surgery on 27th       HISTORY OF PRESENT ILLNESS: 1 or more Elements       Tennille Alcantar is a 80 y.o. female who presents to Emergency Department with a chief complaint of leg pain and shortness of breath. History is obtained from the patient as well as patient's medical record. The patient was recently admitted for sepsis. The patient recently had a left Hemilaminectomy. The patient said that she began over the last 2 days with pain in her left calf. Describes throbbing. Worse with attempting to bear weight. Nothing makes it better. Denies treatment prior to arrival.  She is also related shortness of breath which is worse with activity and exertion. She denies any associated chest pain. She has a history of factor V Leiden. She consulted. The patient was hypoxic and so her anticoagulation has been held at this time. Nursing Notes were all reviewed and agreed with or any disagreements were addressed in the HPI. REVIEW OF SYSTEMS :      Review of Systems   Constitutional:  Negative for chills and fatigue. HENT:  Negative for congestion. Eyes:  Negative for redness. Respiratory:  Positive for shortness of breath. Cardiovascular:  Negative for chest pain. Gastrointestinal:  Negative for abdominal pain, nausea and vomiting. Genitourinary:  Negative for dysuria. Musculoskeletal:  Negative for arthralgias. Skin:  Negative for rash. Neurological:  Negative for light-headedness. Psychiatric/Behavioral:  Negative for confusion. All other systems reviewed and are negative.     Positives and

## 2023-08-11 LAB
ALBUMIN SERPL-MCNC: 3.2 G/DL (ref 3.5–5.2)
ALP SERPL-CCNC: 67 U/L (ref 35–104)
ALT SERPL-CCNC: 22 U/L (ref 0–32)
ANION GAP SERPL CALCULATED.3IONS-SCNC: 12 MMOL/L (ref 7–16)
AST SERPL-CCNC: 29 U/L (ref 0–31)
BASOPHILS # BLD: 0.09 K/UL (ref 0–0.2)
BASOPHILS NFR BLD: 1 % (ref 0–2)
BILIRUB SERPL-MCNC: 0.4 MG/DL (ref 0–1.2)
BUN SERPL-MCNC: 13 MG/DL (ref 6–23)
CALCIUM SERPL-MCNC: 8.3 MG/DL (ref 8.6–10.2)
CHLORIDE SERPL-SCNC: 103 MMOL/L (ref 98–107)
CO2 SERPL-SCNC: 21 MMOL/L (ref 22–29)
CREAT SERPL-MCNC: 1.5 MG/DL (ref 0.5–1)
EOSINOPHIL # BLD: 0.29 K/UL (ref 0.05–0.5)
EOSINOPHILS RELATIVE PERCENT: 2 % (ref 0–6)
ERYTHROCYTE [DISTWIDTH] IN BLOOD BY AUTOMATED COUNT: 12.9 % (ref 11.5–15)
GFR SERPL CREATININE-BSD FRML MDRD: 40 ML/MIN/1.73M2
GLUCOSE SERPL-MCNC: 104 MG/DL (ref 74–99)
HCT VFR BLD AUTO: 28.7 % (ref 34–48)
HGB BLD-MCNC: 8.9 G/DL (ref 11.5–15.5)
IMM GRANULOCYTES # BLD AUTO: 0.19 K/UL (ref 0–0.58)
IMM GRANULOCYTES NFR BLD: 1 % (ref 0–5)
LYMPHOCYTES NFR BLD: 2.07 K/UL (ref 1.5–4)
LYMPHOCYTES RELATIVE PERCENT: 14 % (ref 20–42)
MCH RBC QN AUTO: 30.4 PG (ref 26–35)
MCHC RBC AUTO-ENTMCNC: 31 G/DL (ref 32–34.5)
MCV RBC AUTO: 98 FL (ref 80–99.9)
MONOCYTES NFR BLD: 1.42 K/UL (ref 0.1–0.95)
MONOCYTES NFR BLD: 9 % (ref 2–12)
NEUTROPHILS NFR BLD: 73 % (ref 43–80)
NEUTS SEG NFR BLD: 11.08 K/UL (ref 1.8–7.3)
PARTIAL THROMBOPLASTIN TIME: 122.7 SEC (ref 24.5–35.1)
PARTIAL THROMBOPLASTIN TIME: 53.5 SEC (ref 24.5–35.1)
PARTIAL THROMBOPLASTIN TIME: 75 SEC (ref 24.5–35.1)
PLATELET # BLD AUTO: 196 K/UL (ref 130–450)
PMV BLD AUTO: 10.2 FL (ref 7–12)
POTASSIUM SERPL-SCNC: 3.8 MMOL/L (ref 3.5–5)
PROT SERPL-MCNC: 6.3 G/DL (ref 6.4–8.3)
RBC # BLD AUTO: 2.93 M/UL (ref 3.5–5.5)
SODIUM SERPL-SCNC: 136 MMOL/L (ref 132–146)
WBC OTHER # BLD: 15.1 K/UL (ref 4.5–11.5)

## 2023-08-11 PROCEDURE — 2140000000 HC CCU INTERMEDIATE R&B

## 2023-08-11 PROCEDURE — 6370000000 HC RX 637 (ALT 250 FOR IP): Performed by: INTERNAL MEDICINE

## 2023-08-11 PROCEDURE — 85025 COMPLETE CBC W/AUTO DIFF WBC: CPT

## 2023-08-11 PROCEDURE — 6370000000 HC RX 637 (ALT 250 FOR IP): Performed by: FAMILY MEDICINE

## 2023-08-11 PROCEDURE — 6360000002 HC RX W HCPCS: Performed by: FAMILY MEDICINE

## 2023-08-11 PROCEDURE — 87040 BLOOD CULTURE FOR BACTERIA: CPT

## 2023-08-11 PROCEDURE — 80053 COMPREHEN METABOLIC PANEL: CPT

## 2023-08-11 PROCEDURE — 85730 THROMBOPLASTIN TIME PARTIAL: CPT

## 2023-08-11 PROCEDURE — 2580000003 HC RX 258: Performed by: FAMILY MEDICINE

## 2023-08-11 PROCEDURE — 36415 COLL VENOUS BLD VENIPUNCTURE: CPT

## 2023-08-11 RX ORDER — OXYCODONE HYDROCHLORIDE AND ACETAMINOPHEN 5; 325 MG/1; MG/1
1 TABLET ORAL EVERY 4 HOURS PRN
Status: DISCONTINUED | OUTPATIENT
Start: 2023-08-11 | End: 2023-08-11 | Stop reason: SDUPTHER

## 2023-08-11 RX ORDER — FLUCONAZOLE 150 MG/1
150 TABLET ORAL ONCE
Status: COMPLETED | OUTPATIENT
Start: 2023-08-11 | End: 2023-08-11

## 2023-08-11 RX ORDER — OXYCODONE HYDROCHLORIDE AND ACETAMINOPHEN 5; 325 MG/1; MG/1
1 TABLET ORAL EVERY 4 HOURS PRN
Status: DISCONTINUED | OUTPATIENT
Start: 2023-08-11 | End: 2023-08-17 | Stop reason: HOSPADM

## 2023-08-11 RX ADMIN — PRAVASTATIN SODIUM 40 MG: 20 TABLET ORAL at 09:32

## 2023-08-11 RX ADMIN — CYANOCOBALAMIN TAB 1000 MCG 1000 MCG: 1000 TAB at 09:32

## 2023-08-11 RX ADMIN — PROMETHAZINE HYDROCHLORIDE 12.5 MG: 12.5 TABLET ORAL at 14:33

## 2023-08-11 RX ADMIN — HEPARIN SODIUM 15 UNITS/KG/HR: 10000 INJECTION, SOLUTION INTRAVENOUS at 14:42

## 2023-08-11 RX ADMIN — LEVOTHYROXINE SODIUM 125 MCG: 0.12 TABLET ORAL at 05:42

## 2023-08-11 RX ADMIN — Medication 2000 UNITS: at 09:32

## 2023-08-11 RX ADMIN — FOLIC ACID 1 MG: 1 TABLET ORAL at 09:32

## 2023-08-11 RX ADMIN — AMLODIPINE BESYLATE 5 MG: 5 TABLET ORAL at 09:32

## 2023-08-11 RX ADMIN — FLUCONAZOLE 150 MG: 150 TABLET ORAL at 17:18

## 2023-08-11 RX ADMIN — OXYCODONE AND ACETAMINOPHEN 1 TABLET: 5; 325 TABLET ORAL at 14:30

## 2023-08-11 RX ADMIN — ACETAMINOPHEN 650 MG: 325 TABLET ORAL at 00:25

## 2023-08-11 RX ADMIN — SODIUM CHLORIDE, PRESERVATIVE FREE 10 ML: 5 INJECTION INTRAVENOUS at 09:33

## 2023-08-11 ASSESSMENT — PAIN SCALES - WONG BAKER: WONGBAKER_NUMERICALRESPONSE: 0

## 2023-08-11 ASSESSMENT — PAIN SCALES - GENERAL: PAINLEVEL_OUTOF10: 7

## 2023-08-11 ASSESSMENT — PAIN DESCRIPTION - ORIENTATION: ORIENTATION: LEFT

## 2023-08-11 ASSESSMENT — PAIN DESCRIPTION - DESCRIPTORS: DESCRIPTORS: ACHING

## 2023-08-11 ASSESSMENT — PAIN - FUNCTIONAL ASSESSMENT: PAIN_FUNCTIONAL_ASSESSMENT: PREVENTS OR INTERFERES SOME ACTIVE ACTIVITIES AND ADLS

## 2023-08-11 ASSESSMENT — PAIN DESCRIPTION - LOCATION: LOCATION: LEG

## 2023-08-11 NOTE — ED NOTES
Nurse to nurse report given to RN on [phone]. all questions answered.       Jhonny Black, RN  08/10/23 2015

## 2023-08-11 NOTE — PROGRESS NOTES
Patient arrived to unit from ED with nightgown, shirt, pants, shoes, and undergarments. Reading glasses and cell phone at bedside. Heart monitor applied.

## 2023-08-11 NOTE — PROGRESS NOTES
4 Eyes Skin Assessment     NAME:  Camille Lantigua  YOB: 1955  MEDICAL RECORD NUMBER:  [id number]    The patient is being assessed for  Admission    I agree that at least one RN has performed a thorough Head to Toe Skin Assessment on the patient. ALL assessment sites listed below have been assessed. Areas assessed by both nurses:    Head, Face, Ears, Shoulders, Back, Chest, Arms, Elbows, Hands, Sacrum. Buttock, Coccyx, Ischium, and Legs. Feet and Heels        Does the Patient have a Wound?  No noted wound(s)       Silvano Prevention initiated by RN: No  Wound Care Orders initiated by RN: No    Pressure Injury (Stage 3,4, Unstageable, DTI, NWPT, and Complex wounds) if present, place Wound referral order by RN under : No    New Ostomies, if present place, Ostomy referral order under : No     Nurse 1 eSignature: Electronically signed by Jnenifer Elizabeth RN on 8/10/23 at 11:17 PM EDT    **SHARE this note so that the co-signing nurse can place an eSignature**    Nurse 2 eSignature: Electronically signed by Tiffani Mitchell RN on 8/11/23 at 12:24 AM EDT

## 2023-08-11 NOTE — PLAN OF CARE
Problem: ABCDS Injury Assessment  Goal: Absence of physical injury  Outcome: Progressing     Problem: Safety - Adult  Goal: Free from fall injury  Outcome: Progressing     Problem: Pain  Goal: Verbalizes/displays adequate comfort level or baseline comfort level  Outcome: Progressing     Problem: Discharge Planning  Goal: Discharge to home or other facility with appropriate resources  Outcome: Progressing

## 2023-08-11 NOTE — PATIENT CARE CONFERENCE
P Quality Flow/Interdisciplinary Rounds Progress Note        Quality Flow Rounds held on August 11, 2023    Disciplines Attending:  Bedside Nurse, , , and Nursing Unit Leadership    Cherry Ricks was admitted on 8/10/2023 11:59 AM    Anticipated Discharge Date:       Disposition:    Silvano Score:  Silvano Scale Score: 20    Readmission Risk              Risk of Unplanned Readmission:  16           Discussed patient goal for the day, patient clinical progression, and barriers to discharge.   The following Goal(s) of the Day/Commitment(s) have been identified:  Labs - Report Results and have no active bleeding      Umair Malagon RN  August 11, 2023

## 2023-08-11 NOTE — ACP (ADVANCE CARE PLANNING)
Advance Care Planning   Healthcare Decision Maker:    Primary Decision Maker: Jacob Jackson Child - [phone]    Secondary Decision Maker: Tanya Barth  Child - [phone]    Click here to complete Healthcare Decision Makers including selection of the Healthcare Decision Maker Relationship (ie \"Primary\").

## 2023-08-11 NOTE — CARE COORDINATION
8/11/23  Transition of Care Update. Patient admitted for acute pulmonary embolism and left lower extremity DVT. Patient is on a Heparin drip. Patient states she is independent with ADL's at baseline but did have active home care support with [facility]. [facility] was following  for skilled nursing and therapy after patient's  recent back surgery the end of July. Spoke with Kd Chavez from [facility] who confirms patient is active and will need BACILIO order at discharge. Patient lives alone but her son is currently staying with her as he is building a house. PCP is Dr. Melva Navarro and pharmacy choice is [facility] per patient. Discharge goal is home with home health when medically stable and family will provide transport at discharge. SW/CM to follow. Electronically signed by DIONTE Jalloh on 8/11/2023 at 9:43 AM     Case Management Assessment  Initial Evaluation    Date/Time of Evaluation: 8/11/2023 9:44 AM  Assessment Completed by: DIONTE Jalloh    If patient is discharged prior to next notation, then this note serves as note for discharge by case management. Patient Name: Dayanara Walls                   YOB: 1955  Diagnosis: Acute pulmonary embolism without acute cor pulmonale, unspecified pulmonary embolism type ([address]) [I26.99]                   Date / Time: 8/10/2023 11:59 AM    Patient Admission Status: Inpatient   Readmission Risk (Low < 19, Mod (19-27), High > 27): Readmission Risk Score: 17.4    Current PCP: MELANIE Jones NP  PCP verified by CM? Yes    Chart Reviewed: Yes      History Provided by: Patient  Patient Orientation: Alert and Oriented, Person, Place, Situation    Patient Cognition: Alert    Hospitalization in the last 30 days (Readmission):  Yes    If yes, Readmission Assessment in  Navigator will be completed.     Advance Directives:      Code Status: Full Code   Patient's Primary Decision Maker is:      Primary Decision Maker: Verlinda Phoenix

## 2023-08-12 LAB
BASOPHILS # BLD: 0.09 K/UL (ref 0–0.2)
BASOPHILS NFR BLD: 1 % (ref 0–2)
EOSINOPHIL # BLD: 0.42 K/UL (ref 0.05–0.5)
EOSINOPHILS RELATIVE PERCENT: 3 % (ref 0–6)
ERYTHROCYTE [DISTWIDTH] IN BLOOD BY AUTOMATED COUNT: 13.1 % (ref 11.5–15)
HCT VFR BLD AUTO: 29.8 % (ref 34–48)
HGB BLD-MCNC: 9 G/DL (ref 11.5–15.5)
IMM GRANULOCYTES # BLD AUTO: 0.21 K/UL (ref 0–0.58)
IMM GRANULOCYTES NFR BLD: 2 % (ref 0–5)
LYMPHOCYTES NFR BLD: 1.92 K/UL (ref 1.5–4)
LYMPHOCYTES RELATIVE PERCENT: 14 % (ref 20–42)
MCH RBC QN AUTO: 29.6 PG (ref 26–35)
MCHC RBC AUTO-ENTMCNC: 30.2 G/DL (ref 32–34.5)
MCV RBC AUTO: 98 FL (ref 80–99.9)
MONOCYTES NFR BLD: 1.17 K/UL (ref 0.1–0.95)
MONOCYTES NFR BLD: 8 % (ref 2–12)
NEUTROPHILS NFR BLD: 73 % (ref 43–80)
NEUTS SEG NFR BLD: 10.13 K/UL (ref 1.8–7.3)
PARTIAL THROMBOPLASTIN TIME: 27.3 SEC (ref 24.5–35.1)
PARTIAL THROMBOPLASTIN TIME: 39 SEC (ref 24.5–35.1)
PARTIAL THROMBOPLASTIN TIME: 75.6 SEC (ref 24.5–35.1)
PARTIAL THROMBOPLASTIN TIME: 92.5 SEC (ref 24.5–35.1)
PLATELET # BLD AUTO: 335 K/UL (ref 130–450)
PMV BLD AUTO: 10.6 FL (ref 7–12)
RBC # BLD AUTO: 3.04 M/UL (ref 3.5–5.5)
WBC OTHER # BLD: 13.9 K/UL (ref 4.5–11.5)

## 2023-08-12 PROCEDURE — 6360000002 HC RX W HCPCS: Performed by: FAMILY MEDICINE

## 2023-08-12 PROCEDURE — 2140000000 HC CCU INTERMEDIATE R&B

## 2023-08-12 PROCEDURE — 36415 COLL VENOUS BLD VENIPUNCTURE: CPT

## 2023-08-12 PROCEDURE — 85730 THROMBOPLASTIN TIME PARTIAL: CPT

## 2023-08-12 PROCEDURE — 2580000003 HC RX 258: Performed by: FAMILY MEDICINE

## 2023-08-12 PROCEDURE — 6360000002 HC RX W HCPCS: Performed by: STUDENT IN AN ORGANIZED HEALTH CARE EDUCATION/TRAINING PROGRAM

## 2023-08-12 PROCEDURE — 6370000000 HC RX 637 (ALT 250 FOR IP): Performed by: FAMILY MEDICINE

## 2023-08-12 PROCEDURE — 85025 COMPLETE CBC W/AUTO DIFF WBC: CPT

## 2023-08-12 RX ORDER — MORPHINE SULFATE 2 MG/ML
1 INJECTION, SOLUTION INTRAMUSCULAR; INTRAVENOUS EVERY 4 HOURS PRN
Status: DISCONTINUED | OUTPATIENT
Start: 2023-08-12 | End: 2023-08-15

## 2023-08-12 RX ADMIN — SODIUM CHLORIDE, PRESERVATIVE FREE 10 ML: 5 INJECTION INTRAVENOUS at 21:03

## 2023-08-12 RX ADMIN — LEVOTHYROXINE SODIUM 125 MCG: 0.12 TABLET ORAL at 06:57

## 2023-08-12 RX ADMIN — PRAVASTATIN SODIUM 40 MG: 20 TABLET ORAL at 07:00

## 2023-08-12 RX ADMIN — FOLIC ACID 1 MG: 1 TABLET ORAL at 07:00

## 2023-08-12 RX ADMIN — AMLODIPINE BESYLATE 5 MG: 5 TABLET ORAL at 07:00

## 2023-08-12 RX ADMIN — HEPARIN SODIUM 15 UNITS/KG/HR: 10000 INJECTION, SOLUTION INTRAVENOUS at 21:05

## 2023-08-12 RX ADMIN — HEPARIN SODIUM 15 UNITS/KG/HR: 10000 INJECTION, SOLUTION INTRAVENOUS at 09:19

## 2023-08-12 RX ADMIN — Medication 2000 UNITS: at 07:00

## 2023-08-12 RX ADMIN — CYANOCOBALAMIN TAB 1000 MCG 1000 MCG: 1000 TAB at 07:00

## 2023-08-12 RX ADMIN — MORPHINE SULFATE 1 MG: 2 INJECTION, SOLUTION INTRAMUSCULAR; INTRAVENOUS at 13:48

## 2023-08-12 RX ADMIN — HEPARIN SODIUM 4660 UNITS: 1000 INJECTION INTRAVENOUS; SUBCUTANEOUS at 09:20

## 2023-08-12 ASSESSMENT — PAIN DESCRIPTION - LOCATION: LOCATION: LEG

## 2023-08-12 ASSESSMENT — PAIN DESCRIPTION - ONSET: ONSET: ON-GOING

## 2023-08-12 ASSESSMENT — PAIN SCALES - GENERAL
PAINLEVEL_OUTOF10: 0
PAINLEVEL_OUTOF10: 7
PAINLEVEL_OUTOF10: 5
PAINLEVEL_OUTOF10: 0

## 2023-08-12 ASSESSMENT — PAIN - FUNCTIONAL ASSESSMENT: PAIN_FUNCTIONAL_ASSESSMENT: PREVENTS OR INTERFERES SOME ACTIVE ACTIVITIES AND ADLS

## 2023-08-12 ASSESSMENT — PAIN DESCRIPTION - PAIN TYPE: TYPE: ACUTE PAIN

## 2023-08-12 ASSESSMENT — PAIN DESCRIPTION - DESCRIPTORS: DESCRIPTORS: ACHING;DISCOMFORT;SQUEEZING

## 2023-08-12 ASSESSMENT — PAIN DESCRIPTION - FREQUENCY: FREQUENCY: CONTINUOUS

## 2023-08-12 ASSESSMENT — PAIN DESCRIPTION - ORIENTATION: ORIENTATION: LEFT

## 2023-08-12 NOTE — PLAN OF CARE
Problem: Discharge Planning  Goal: Discharge to home or other facility with appropriate resources  Outcome: Progressing     Problem: Pain  Goal: Verbalizes/displays adequate comfort level or baseline comfort level  8/12/2023 1008 by Ayush Branch RN  Outcome: Progressing     Problem: Safety - Adult  Goal: Free from fall injury  8/12/2023 1008 by yAush Branch RN  Outcome: Progressing     Problem: ABCDS Injury Assessment  Goal: Absence of physical injury  Outcome: Progressing

## 2023-08-12 NOTE — PATIENT CARE CONFERENCE
P Quality Flow/Interdisciplinary Rounds Progress Note        Quality Flow Rounds held on August 12, 2023    Disciplines Attending:  Bedside Nurse and Nursing Unit Leadership    Bret Watson was admitted on 8/10/2023 11:59 AM    Anticipated Discharge Date:       Disposition:    Silvano Score:  Silvano Scale Score: 20    Readmission Risk              Risk of Unplanned Readmission:  16           Discussed patient goal for the day, patient clinical progression, and barriers to discharge.   The following Goal(s) of the Day/Commitment(s) have been identified:  Labs - Report Results      Carmelo Menchaca RN  August 12, 2023

## 2023-08-12 NOTE — CONSULTS
[facility]  Hematology/Oncology  Consult      Patient Name: Basil Daley  YOB: 1955  PCP: MELANIE Cortes NP   Referring Provider:      Reason for Consultation:   Chief Complaint   Patient presents with    Leg Pain     Left leg pain and swelling, has been off of thinner since back surgery on 27th        History of Present Illness: 81-year-old woman with multiple admissions lately. Apparently patient has history of abnormal primary thrombophilia and recurrent DVTs and PEs in the past and there is mention of factor V Leiden mutation in her EMR but no lab documentation. She had been on long-term anticoagulation with Xarelto. On 7/26/2023 she had a lumbar laminectomy and she was taken off Xarelto preoperatively and given Lovenox prophylaxis. Postoperatively she was readmitted with septic shock and required IV antibiotics and developed myelosuppression from sepsis and thrombocytopenia and her Lovenox was held at that time and the Xarelto was kept on hold. She was again hospitalized through ED on 8/10/2023 with left leg pain and swelling. Doppler ultrasound studies showed recurrent occlusive DVT involving the common femoral vein, saphenofemoral junction superficial femoral and popliteal veins. CTA of the chest showed small left lower pulmonary emboli and no evidence of cardiac strain  She has been initiated on heparin drip and is hemodynamically stable. Her CBC shows moderate neutrophilic leukocytosis with a hemoglobin of 9 MCV of 98 and normal platelet count of 394. Hepatic panel relevant for hypoalbuminemia. BMP shows a serum creatinine of 1.6 with an estimated GFR of 40 mL/min  On reviewing her EMR, no thrombophilia panel is noted but her serum homocysteine level done on 8/3/2023 was elevated at 23.     Diagnostic Data:     Past Medical History:   Diagnosis Date    H/O cardiovascular stress test 05/04/2021    Lexiscan    Hepatitis C     Hypertension     PE (pulmonary is no acute abnormality of the thoracic aorta. Lungs/pleura: The lungs are without acute process. No focal consolidation or pulmonary edema. No evidence of pleural effusion or pneumothorax. Mild emphysema. Subsegmental atelectasis or scarring in the right lower lobe. Mild diffuse bronchial wall thickening. Upper Abdomen: Cholecystectomy. Small hiatal hernia. Unchanged small left hepatic lobe hypodensity of doubtful significance in the absence of a known malignancy. Soft Tissues/Bones: No acute bone or soft tissue abnormality. 1. Small left lower lobe pulmonary emboli present. 2. No large or central embolus. 3. Small pericardial effusion. 4. Mild emphysema. CT ASP ABS HEMATOMA BULLA CYST    Result Date: 8/4/2023  PROCEDURE: CT GUIDED NDL PLACEMENT; CT ASP ABS HEMATOMA BULLA CYST MODERATE CONSCIOUS SEDATION 8/3/2023 HISTORY: ORDERING SYSTEM PROVIDED HISTORY: aspiration of subcutaneous fluid collection on lumbar area; concerns for abscess TECHNOLOGIST PROVIDED HISTORY: Reason for exam:->aspiration of subcutaneous fluid collection on lumbar area; concerns for abscess What reading provider will be dictating this exam?->CRC TECHNIQUE: Automated exposure control, iterative reconstruction, and/or weight based adjustment of the mA/kV was utilized to reduce the radiation dose to as low as reasonably achievable. CONTRAST: None SEDATION: Moderate sedation was ordered and supervised by the attending with physician face-to-face monitoring. Medications were provided and recorded by Radiology nurses. FLUOROSCOPY DOSE AND TYPE: Radiation Exposure Index: 13 40 mGy, DLP DESCRIPTION OF PROCEDURE: Informed consent was obtained after a detailed explanation of the procedure including risks, benefits, and alternatives. Universal protocol was observed. Sterile gowns, masks, hats and gloves utilized for maximal sterile barrier.  FINDINGS: Under CT guidance a 17 gauge introducer needle was used to access a subcutaneous fluid past including recurrent DVTs and PEs. She is status post lumbar laminectomy on 7/26/2023 and her Xarelto was held preoperatively and postoperatively she was on Lovenox. She was readmitted with sepsis 1 week after surgery and went on antibiotics and had thrombocytopenia related to myelosuppression by sepsis and her Lovenox was held and her Xarelto was kept on hold due to thrombocytopenia. She is readmitted again with recurrent extensive DVT proximal involving left femoral vein as well as bilateral small subsegmental PE without cardiac strain    Her recent labs show elevated homocystine level which would suggest MTHFR 677 mutation. She is presently on heparin drip and hemodynamically stable and may be transitioned back to oral DOAC such as Eliquis or Xarelto prior to discharge. Thank you for the consult, we will follow. Mary Ann Beatty. Roni Green M.D., F.A.C.P.   Electronically signed 8/12/2023 at 6:47 PM

## 2023-08-13 ENCOUNTER — APPOINTMENT (OUTPATIENT)
Dept: CT IMAGING | Age: 68
End: 2023-08-13
Attending: SURGERY
Payer: MEDICARE

## 2023-08-13 LAB
ALBUMIN SERPL-MCNC: 3.2 G/DL (ref 3.5–5.2)
ALP SERPL-CCNC: 119 U/L (ref 35–104)
ALT SERPL-CCNC: 52 U/L (ref 0–32)
ANION GAP SERPL CALCULATED.3IONS-SCNC: 12 MMOL/L (ref 7–16)
AST SERPL-CCNC: 61 U/L (ref 0–31)
BASOPHILS # BLD: 0.12 K/UL (ref 0–0.2)
BASOPHILS NFR BLD: 1 % (ref 0–2)
BILIRUB SERPL-MCNC: 0.4 MG/DL (ref 0–1.2)
BUN SERPL-MCNC: 17 MG/DL (ref 6–23)
CALCIUM SERPL-MCNC: 8.6 MG/DL (ref 8.6–10.2)
CHLORIDE SERPL-SCNC: 102 MMOL/L (ref 98–107)
CO2 SERPL-SCNC: 24 MMOL/L (ref 22–29)
CREAT SERPL-MCNC: 1.4 MG/DL (ref 0.5–1)
EOSINOPHIL # BLD: 0.45 K/UL (ref 0.05–0.5)
EOSINOPHILS RELATIVE PERCENT: 3 % (ref 0–6)
ERYTHROCYTE [DISTWIDTH] IN BLOOD BY AUTOMATED COUNT: 13.2 % (ref 11.5–15)
GFR SERPL CREATININE-BSD FRML MDRD: 40 ML/MIN/1.73M2
GLUCOSE SERPL-MCNC: 95 MG/DL (ref 74–99)
HCT VFR BLD AUTO: 30.7 % (ref 34–48)
HGB BLD-MCNC: 9.1 G/DL (ref 11.5–15.5)
IMM GRANULOCYTES # BLD AUTO: 0.26 K/UL (ref 0–0.58)
IMM GRANULOCYTES NFR BLD: 2 % (ref 0–5)
LYMPHOCYTES NFR BLD: 1.79 K/UL (ref 1.5–4)
LYMPHOCYTES RELATIVE PERCENT: 14 % (ref 20–42)
MCH RBC QN AUTO: 29.7 PG (ref 26–35)
MCHC RBC AUTO-ENTMCNC: 29.6 G/DL (ref 32–34.5)
MCV RBC AUTO: 100.3 FL (ref 80–99.9)
MONOCYTES NFR BLD: 1.26 K/UL (ref 0.1–0.95)
MONOCYTES NFR BLD: 10 % (ref 2–12)
NEUTROPHILS NFR BLD: 71 % (ref 43–80)
NEUTS SEG NFR BLD: 9.39 K/UL (ref 1.8–7.3)
PARTIAL THROMBOPLASTIN TIME: 66.7 SEC (ref 24.5–35.1)
PLATELET # BLD AUTO: 321 K/UL (ref 130–450)
PMV BLD AUTO: 10.9 FL (ref 7–12)
POTASSIUM SERPL-SCNC: 3.9 MMOL/L (ref 3.5–5)
PROT SERPL-MCNC: 6.8 G/DL (ref 6.4–8.3)
RBC # BLD AUTO: 3.06 M/UL (ref 3.5–5.5)
SODIUM SERPL-SCNC: 138 MMOL/L (ref 132–146)
WBC OTHER # BLD: 13.3 K/UL (ref 4.5–11.5)

## 2023-08-13 PROCEDURE — 6360000002 HC RX W HCPCS: Performed by: FAMILY MEDICINE

## 2023-08-13 PROCEDURE — 36415 COLL VENOUS BLD VENIPUNCTURE: CPT

## 2023-08-13 PROCEDURE — 6360000004 HC RX CONTRAST MEDICATION: Performed by: RADIOLOGY

## 2023-08-13 PROCEDURE — 2580000003 HC RX 258: Performed by: FAMILY MEDICINE

## 2023-08-13 PROCEDURE — 2140000000 HC CCU INTERMEDIATE R&B

## 2023-08-13 PROCEDURE — 74174 CTA ABD&PLVS W/CONTRAST: CPT

## 2023-08-13 PROCEDURE — 80053 COMPREHEN METABOLIC PANEL: CPT

## 2023-08-13 PROCEDURE — 2500000003 HC RX 250 WO HCPCS: Performed by: INTERNAL MEDICINE

## 2023-08-13 PROCEDURE — 85025 COMPLETE CBC W/AUTO DIFF WBC: CPT

## 2023-08-13 PROCEDURE — 6370000000 HC RX 637 (ALT 250 FOR IP): Performed by: FAMILY MEDICINE

## 2023-08-13 PROCEDURE — 85730 THROMBOPLASTIN TIME PARTIAL: CPT

## 2023-08-13 PROCEDURE — 6360000002 HC RX W HCPCS: Performed by: STUDENT IN AN ORGANIZED HEALTH CARE EDUCATION/TRAINING PROGRAM

## 2023-08-13 PROCEDURE — 6370000000 HC RX 637 (ALT 250 FOR IP): Performed by: CLINICAL NURSE SPECIALIST

## 2023-08-13 RX ORDER — FLUCONAZOLE 150 MG/1
150 TABLET ORAL ONCE
Status: COMPLETED | OUTPATIENT
Start: 2023-08-13 | End: 2023-08-13

## 2023-08-13 RX ADMIN — HEPARIN SODIUM 15 UNITS/KG/HR: 10000 INJECTION, SOLUTION INTRAVENOUS at 23:41

## 2023-08-13 RX ADMIN — AMLODIPINE BESYLATE 5 MG: 5 TABLET ORAL at 08:23

## 2023-08-13 RX ADMIN — SODIUM CHLORIDE, PRESERVATIVE FREE 10 ML: 5 INJECTION INTRAVENOUS at 08:23

## 2023-08-13 RX ADMIN — PRAVASTATIN SODIUM 40 MG: 20 TABLET ORAL at 08:22

## 2023-08-13 RX ADMIN — ANTI-FUNGAL POWDER MICONAZOLE NITRATE TALC FREE: 1.42 POWDER TOPICAL at 08:23

## 2023-08-13 RX ADMIN — LEVOTHYROXINE SODIUM 125 MCG: 0.12 TABLET ORAL at 06:17

## 2023-08-13 RX ADMIN — MORPHINE SULFATE 1 MG: 2 INJECTION, SOLUTION INTRAMUSCULAR; INTRAVENOUS at 13:11

## 2023-08-13 RX ADMIN — FLUCONAZOLE 150 MG: 150 TABLET ORAL at 11:05

## 2023-08-13 RX ADMIN — MORPHINE SULFATE 1 MG: 2 INJECTION, SOLUTION INTRAMUSCULAR; INTRAVENOUS at 18:52

## 2023-08-13 RX ADMIN — SODIUM CHLORIDE, PRESERVATIVE FREE 10 ML: 5 INJECTION INTRAVENOUS at 21:05

## 2023-08-13 RX ADMIN — IOPAMIDOL 75 ML: 755 INJECTION, SOLUTION INTRAVENOUS at 16:14

## 2023-08-13 RX ADMIN — ANTI-FUNGAL POWDER MICONAZOLE NITRATE TALC FREE: 1.42 POWDER TOPICAL at 21:05

## 2023-08-13 RX ADMIN — FOLIC ACID 1 MG: 1 TABLET ORAL at 08:22

## 2023-08-13 RX ADMIN — CYANOCOBALAMIN TAB 1000 MCG 1000 MCG: 1000 TAB at 08:22

## 2023-08-13 RX ADMIN — Medication 2000 UNITS: at 08:22

## 2023-08-13 RX ADMIN — HEPARIN SODIUM 15 UNITS/KG/HR: 10000 INJECTION, SOLUTION INTRAVENOUS at 09:52

## 2023-08-13 ASSESSMENT — PAIN DESCRIPTION - ORIENTATION
ORIENTATION: LEFT

## 2023-08-13 ASSESSMENT — PAIN SCALES - GENERAL
PAINLEVEL_OUTOF10: 7
PAINLEVEL_OUTOF10: 8
PAINLEVEL_OUTOF10: 8

## 2023-08-13 ASSESSMENT — PAIN DESCRIPTION - LOCATION
LOCATION: LEG

## 2023-08-13 NOTE — PROGRESS NOTES
wheezing, crackles, or rhonchi. On room air. Diminished to bases. Cardiovascular: S1 and S2 are rhythmic and regular. No murmurs appreciated. Abdomen: Positive bowel sounds to auscultation. Benign to palpation. Extremities: No clubbing, no cyanosis, ++ edema. Left > right   PIV    Laboratory and Tests Review:  Lab Results   Component Value Date    WBC 13.3 (H) 08/13/2023    WBC 13.9 (H) 08/12/2023    WBC 15.1 (H) 08/11/2023    HGB 9.1 (L) 08/13/2023    HCT 30.7 (L) 08/13/2023    .3 (H) 08/13/2023     08/13/2023     Lab Results   Component Value Date    NEUTROABS 9.39 (H) 08/13/2023    NEUTROABS 10.13 (H) 08/12/2023    NEUTROABS 11.08 (H) 08/11/2023     Lab Results   Component Value Date    CRP 46.0 (H) 08/02/2023     Lab Results   Component Value Date    SEDRATE 75 (H) 08/02/2023    SEDRATE 35 (H) 02/26/2016     Lab Results   Component Value Date    ALT 52 (H) 08/13/2023    AST 61 (H) 08/13/2023    ALKPHOS 119 (H) 08/13/2023    BILITOT 0.4 08/13/2023     Lab Results   Component Value Date/Time     08/13/2023 05:49 AM    K 3.9 08/13/2023 05:49 AM     08/13/2023 05:49 AM    CO2 24 08/13/2023 05:49 AM    BUN 17 08/13/2023 05:49 AM    CREATININE 1.4 08/13/2023 05:49 AM    GFRAA 46 10/31/2021 12:00 PM    LABGLOM 40 08/13/2023 05:49 AM    GLUCOSE 95 08/13/2023 05:49 AM    PROT 6.8 08/13/2023 05:49 AM    LABALBU 3.2 08/13/2023 05:49 AM    CALCIUM 8.6 08/13/2023 05:49 AM    BILITOT 0.4 08/13/2023 05:49 AM    ALKPHOS 119 08/13/2023 05:49 AM    AST 61 08/13/2023 05:49 AM    ALT 52 08/13/2023 05:49 AM     Radiology:  CTA  Impression:        1. Small left lower lobe pulmonary emboli present. 2. No large or central embolus. 3. Small pericardial effusion. 4. Mild emphysema.         Microbiology:   8/11/2023- blood culture- no growth to date     ASSESSMENT:  Fevers- resolved   Leukocytosis, resolving   Pulmonary embolism and left lower leg DVT   Vaginal yeast infection     PLAN:  Monitor off antibiotics  Will give one more dose of diflucan 150 mg po x1 today   Continue topical miconazole powder on vulvar area  Monitor labs- WBC - 13.3    MELANIE Valenzuela  9:43 AM  8/13/2023       I had a face-to-face encounter with the patient at the bedside. I agree with the nurse practitioner's note and assessment and plan as detailed above. Necessary editing and changes made to the note by myself. Thank you for involving me in the care of Yissel Christine. I will sign off for now. Please do not hesitate to call for any questions, concerns, or new findings.     Indira Iniguez MD  08/13/23  7:03 PM

## 2023-08-13 NOTE — PATIENT CARE CONFERENCE
[facility] Quality Flow/Interdisciplinary Rounds Progress Note        Quality Flow Rounds held on August 13, 2023    Disciplines Attending:  Bedside Nurse and Nursing Unit Leadership    Basil Daley was admitted on 8/10/2023 11:59 AM    Anticipated Discharge Date:       Disposition:    Silvano Score:  Silvano Scale Score: 20    Readmission Risk              Risk of Unplanned Readmission:  17           Discussed patient goal for the day, patient clinical progression, and barriers to discharge.   The following Goal(s) of the Day/Commitment(s) have been identified:  Labs - Report Results      Jazzy Galan RN  August 13, 2023

## 2023-08-13 NOTE — PROGRESS NOTES
CONSULT sent through [employer] to Dr. Lane Jo to see for occlusive DVT FEMORAL VEIN.

## 2023-08-14 PROBLEM — I82.220 ACUTE DEEP VEIN THROMBOSIS (DVT) OF INFERIOR VENA CAVA (HCC): Status: ACTIVE | Noted: 2023-08-14

## 2023-08-14 LAB
ANION GAP SERPL CALCULATED.3IONS-SCNC: 13 MMOL/L (ref 7–16)
BASOPHILS # BLD: 0.1 K/UL (ref 0–0.2)
BASOPHILS NFR BLD: 1 % (ref 0–2)
BUN SERPL-MCNC: 16 MG/DL (ref 6–23)
CALCIUM SERPL-MCNC: 8.5 MG/DL (ref 8.6–10.2)
CHLORIDE SERPL-SCNC: 99 MMOL/L (ref 98–107)
CO2 SERPL-SCNC: 22 MMOL/L (ref 22–29)
CREAT SERPL-MCNC: 1.3 MG/DL (ref 0.5–1)
EOSINOPHIL # BLD: 0.62 K/UL (ref 0.05–0.5)
EOSINOPHILS RELATIVE PERCENT: 5 % (ref 0–6)
ERYTHROCYTE [DISTWIDTH] IN BLOOD BY AUTOMATED COUNT: 13.3 % (ref 11.5–15)
ERYTHROCYTE [DISTWIDTH] IN BLOOD BY AUTOMATED COUNT: 13.4 % (ref 11.5–15)
FIBRINOGEN PPP-MCNC: 254 MG/DL (ref 200–400)
GFR SERPL CREATININE-BSD FRML MDRD: 46 ML/MIN/1.73M2
GLUCOSE SERPL-MCNC: 91 MG/DL (ref 74–99)
HCT VFR BLD AUTO: 29.4 % (ref 34–48)
HCT VFR BLD AUTO: 30.8 % (ref 34–48)
HGB BLD-MCNC: 8.7 G/DL (ref 11.5–15.5)
HGB BLD-MCNC: 9.2 G/DL (ref 11.5–15.5)
IMM GRANULOCYTES # BLD AUTO: 0.31 K/UL (ref 0–0.58)
IMM GRANULOCYTES NFR BLD: 3 % (ref 0–5)
LYMPHOCYTES NFR BLD: 2.04 K/UL (ref 1.5–4)
LYMPHOCYTES RELATIVE PERCENT: 16 % (ref 20–42)
MCH RBC QN AUTO: 29.4 PG (ref 26–35)
MCH RBC QN AUTO: 29.8 PG (ref 26–35)
MCHC RBC AUTO-ENTMCNC: 29.6 G/DL (ref 32–34.5)
MCHC RBC AUTO-ENTMCNC: 29.9 G/DL (ref 32–34.5)
MCV RBC AUTO: 99.3 FL (ref 80–99.9)
MCV RBC AUTO: 99.7 FL (ref 80–99.9)
MONOCYTES NFR BLD: 1.1 K/UL (ref 0.1–0.95)
MONOCYTES NFR BLD: 9 % (ref 2–12)
NEUTROPHILS NFR BLD: 67 % (ref 43–80)
NEUTS SEG NFR BLD: 8.46 K/UL (ref 1.8–7.3)
PARTIAL THROMBOPLASTIN TIME: 37.5 SEC (ref 24.5–35.1)
PARTIAL THROMBOPLASTIN TIME: 48.7 SEC (ref 24.5–35.1)
PLATELET # BLD AUTO: 365 K/UL (ref 130–450)
PLATELET # BLD AUTO: 376 K/UL (ref 130–450)
PMV BLD AUTO: 10.3 FL (ref 7–12)
PMV BLD AUTO: 11.1 FL (ref 7–12)
POTASSIUM SERPL-SCNC: 3.9 MMOL/L (ref 3.5–5)
RBC # BLD AUTO: 2.96 M/UL (ref 3.5–5.5)
RBC # BLD AUTO: 3.09 M/UL (ref 3.5–5.5)
SODIUM SERPL-SCNC: 134 MMOL/L (ref 132–146)
WBC OTHER # BLD: 12.6 K/UL (ref 4.5–11.5)
WBC OTHER # BLD: 19.6 K/UL (ref 4.5–11.5)

## 2023-08-14 PROCEDURE — 6360000002 HC RX W HCPCS

## 2023-08-14 PROCEDURE — 97161 PT EVAL LOW COMPLEX 20 MIN: CPT

## 2023-08-14 PROCEDURE — 2580000003 HC RX 258: Performed by: SURGERY

## 2023-08-14 PROCEDURE — 85027 COMPLETE CBC AUTOMATED: CPT

## 2023-08-14 PROCEDURE — 36415 COLL VENOUS BLD VENIPUNCTURE: CPT

## 2023-08-14 PROCEDURE — 36010 PLACE CATHETER IN VEIN: CPT | Performed by: SURGERY

## 2023-08-14 PROCEDURE — 37212 THROMBOLYTIC VENOUS THERAPY: CPT

## 2023-08-14 PROCEDURE — 6370000000 HC RX 637 (ALT 250 FOR IP): Performed by: SURGERY

## 2023-08-14 PROCEDURE — C1751 CATH, INF, PER/CENT/MIDLINE: HCPCS

## 2023-08-14 PROCEDURE — 99233 SBSQ HOSP IP/OBS HIGH 50: CPT | Performed by: SURGERY

## 2023-08-14 PROCEDURE — 6370000000 HC RX 637 (ALT 250 FOR IP): Performed by: FAMILY MEDICINE

## 2023-08-14 PROCEDURE — 2580000003 HC RX 258: Performed by: FAMILY MEDICINE

## 2023-08-14 PROCEDURE — 97530 THERAPEUTIC ACTIVITIES: CPT

## 2023-08-14 PROCEDURE — 80048 BASIC METABOLIC PNL TOTAL CA: CPT

## 2023-08-14 PROCEDURE — 36010 PLACE CATHETER IN VEIN: CPT

## 2023-08-14 PROCEDURE — 85025 COMPLETE CBC W/AUTO DIFF WBC: CPT

## 2023-08-14 PROCEDURE — 85730 THROMBOPLASTIN TIME PARTIAL: CPT

## 2023-08-14 PROCEDURE — C1887 CATHETER, GUIDING: HCPCS

## 2023-08-14 PROCEDURE — 6360000002 HC RX W HCPCS: Performed by: FAMILY MEDICINE

## 2023-08-14 PROCEDURE — 6360000002 HC RX W HCPCS: Performed by: SURGERY

## 2023-08-14 PROCEDURE — 97165 OT EVAL LOW COMPLEX 30 MIN: CPT

## 2023-08-14 PROCEDURE — 2000000000 HC ICU R&B

## 2023-08-14 PROCEDURE — 2700000000 HC OXYGEN THERAPY PER DAY

## 2023-08-14 PROCEDURE — 37212 THROMBOLYTIC VENOUS THERAPY: CPT | Performed by: SURGERY

## 2023-08-14 PROCEDURE — 85384 FIBRINOGEN ACTIVITY: CPT

## 2023-08-14 PROCEDURE — C1894 INTRO/SHEATH, NON-LASER: HCPCS

## 2023-08-14 PROCEDURE — 6360000002 HC RX W HCPCS: Performed by: STUDENT IN AN ORGANIZED HEALTH CARE EDUCATION/TRAINING PROGRAM

## 2023-08-14 PROCEDURE — C1769 GUIDE WIRE: HCPCS

## 2023-08-14 PROCEDURE — 2709999900 HC NON-CHARGEABLE SUPPLY

## 2023-08-14 PROCEDURE — 3E04317 INTRODUCTION OF OTHER THROMBOLYTIC INTO CENTRAL VEIN, PERCUTANEOUS APPROACH: ICD-10-PCS | Performed by: SURGERY

## 2023-08-14 PROCEDURE — 06H03DZ INSERTION OF INTRALUMINAL DEVICE INTO INFERIOR VENA CAVA, PERCUTANEOUS APPROACH: ICD-10-PCS | Performed by: SURGERY

## 2023-08-14 PROCEDURE — 2500000003 HC RX 250 WO HCPCS

## 2023-08-14 PROCEDURE — 4A023N7 MEASUREMENT OF CARDIAC SAMPLING AND PRESSURE, LEFT HEART, PERCUTANEOUS APPROACH: ICD-10-PCS | Performed by: SURGERY

## 2023-08-14 RX ORDER — ONDANSETRON 2 MG/ML
4 INJECTION INTRAMUSCULAR; INTRAVENOUS EVERY 6 HOURS PRN
Status: DISCONTINUED | OUTPATIENT
Start: 2023-08-14 | End: 2023-08-17 | Stop reason: HOSPADM

## 2023-08-14 RX ORDER — SODIUM CHLORIDE 9 MG/ML
25 INJECTION, SOLUTION INTRAVENOUS PRN
Status: DISCONTINUED | OUTPATIENT
Start: 2023-08-14 | End: 2023-08-15

## 2023-08-14 RX ORDER — ONDANSETRON 4 MG/1
4 TABLET, ORALLY DISINTEGRATING ORAL EVERY 8 HOURS PRN
Status: DISCONTINUED | OUTPATIENT
Start: 2023-08-14 | End: 2023-08-17 | Stop reason: HOSPADM

## 2023-08-14 RX ORDER — SODIUM CHLORIDE 0.9 % (FLUSH) 0.9 %
5-40 SYRINGE (ML) INJECTION EVERY 12 HOURS SCHEDULED
Status: DISCONTINUED | OUTPATIENT
Start: 2023-08-14 | End: 2023-08-15

## 2023-08-14 RX ORDER — HEPARIN SODIUM 10000 [USP'U]/100ML
500 INJECTION, SOLUTION INTRAVENOUS CONTINUOUS
Status: DISCONTINUED | OUTPATIENT
Start: 2023-08-14 | End: 2023-08-15

## 2023-08-14 RX ORDER — SODIUM CHLORIDE 0.9 % (FLUSH) 0.9 %
5-40 SYRINGE (ML) INJECTION PRN
Status: DISCONTINUED | OUTPATIENT
Start: 2023-08-14 | End: 2023-08-17 | Stop reason: HOSPADM

## 2023-08-14 RX ORDER — SODIUM CHLORIDE 9 MG/ML
INJECTION, SOLUTION INTRAVENOUS CONTINUOUS PRN
Status: DISCONTINUED | OUTPATIENT
Start: 2023-08-14 | End: 2023-08-15

## 2023-08-14 RX ORDER — SODIUM CHLORIDE 9 MG/ML
INJECTION, SOLUTION INTRAVENOUS CONTINUOUS
Status: DISCONTINUED | OUTPATIENT
Start: 2023-08-14 | End: 2023-08-15

## 2023-08-14 RX ORDER — DIPHENHYDRAMINE HYDROCHLORIDE 50 MG/ML
INJECTION INTRAMUSCULAR; INTRAVENOUS
Status: COMPLETED
Start: 2023-08-14 | End: 2023-08-14

## 2023-08-14 RX ADMIN — SODIUM CHLORIDE: 9 INJECTION, SOLUTION INTRAVENOUS at 15:08

## 2023-08-14 RX ADMIN — ONDANSETRON 4 MG: 2 INJECTION INTRAMUSCULAR; INTRAVENOUS at 16:46

## 2023-08-14 RX ADMIN — HEPARIN SODIUM 4660 UNITS: 1000 INJECTION INTRAVENOUS; SUBCUTANEOUS at 06:46

## 2023-08-14 RX ADMIN — ANTI-FUNGAL POWDER MICONAZOLE NITRATE TALC FREE: 1.42 POWDER TOPICAL at 10:13

## 2023-08-14 RX ADMIN — ALTEPLASE 1 MG/HR: 2.2 INJECTION, POWDER, LYOPHILIZED, FOR SOLUTION INTRAVENOUS at 15:09

## 2023-08-14 RX ADMIN — Medication 2000 UNITS: at 09:00

## 2023-08-14 RX ADMIN — ACETAMINOPHEN 650 MG: 325 TABLET ORAL at 20:02

## 2023-08-14 RX ADMIN — MORPHINE SULFATE 1 MG: 2 INJECTION, SOLUTION INTRAMUSCULAR; INTRAVENOUS at 20:58

## 2023-08-14 RX ADMIN — CYANOCOBALAMIN TAB 1000 MCG 1000 MCG: 1000 TAB at 09:01

## 2023-08-14 RX ADMIN — SODIUM CHLORIDE, PRESERVATIVE FREE 10 ML: 5 INJECTION INTRAVENOUS at 09:01

## 2023-08-14 RX ADMIN — HEPARIN SODIUM AND DEXTROSE 500 UNITS/HR: 10000; 5 INJECTION INTRAVENOUS at 15:10

## 2023-08-14 RX ADMIN — FENTANYL CITRATE 50 MCG: 50 INJECTION INTRAMUSCULAR; INTRAVENOUS at 23:00

## 2023-08-14 RX ADMIN — FENTANYL CITRATE 50 MCG: 50 INJECTION INTRAMUSCULAR; INTRAVENOUS at 16:31

## 2023-08-14 RX ADMIN — DIPHENHYDRAMINE HYDROCHLORIDE 50 MG: 50 INJECTION, SOLUTION INTRAMUSCULAR; INTRAVENOUS at 16:49

## 2023-08-14 RX ADMIN — AMLODIPINE BESYLATE 5 MG: 5 TABLET ORAL at 09:00

## 2023-08-14 RX ADMIN — MORPHINE SULFATE 1 MG: 2 INJECTION, SOLUTION INTRAMUSCULAR; INTRAVENOUS at 09:01

## 2023-08-14 RX ADMIN — CEFAZOLIN 2000 MG: 2 INJECTION, POWDER, FOR SOLUTION INTRAMUSCULAR; INTRAVENOUS at 16:32

## 2023-08-14 RX ADMIN — HEPARIN SODIUM 4660 UNITS: 1000 INJECTION INTRAVENOUS; SUBCUTANEOUS at 10:07

## 2023-08-14 RX ADMIN — ANTI-FUNGAL POWDER MICONAZOLE NITRATE TALC FREE: 1.42 POWDER TOPICAL at 20:02

## 2023-08-14 RX ADMIN — PRAVASTATIN SODIUM 40 MG: 20 TABLET ORAL at 09:01

## 2023-08-14 RX ADMIN — SODIUM CHLORIDE, PRESERVATIVE FREE 10 ML: 5 INJECTION INTRAVENOUS at 20:02

## 2023-08-14 RX ADMIN — FOLIC ACID 1 MG: 1 TABLET ORAL at 09:01

## 2023-08-14 RX ADMIN — LEVOTHYROXINE SODIUM 125 MCG: 0.12 TABLET ORAL at 05:50

## 2023-08-14 ASSESSMENT — PAIN DESCRIPTION - ORIENTATION
ORIENTATION: LEFT
ORIENTATION: RIGHT
ORIENTATION: LEFT
ORIENTATION: MID;RIGHT;LEFT;LOWER

## 2023-08-14 ASSESSMENT — PAIN SCALES - WONG BAKER
WONGBAKER_NUMERICALRESPONSE: 0
WONGBAKER_NUMERICALRESPONSE: 0

## 2023-08-14 ASSESSMENT — PAIN DESCRIPTION - LOCATION
LOCATION: LEG
LOCATION: BACK;LEG
LOCATION: LEG
LOCATION: LEG

## 2023-08-14 ASSESSMENT — PAIN SCALES - GENERAL
PAINLEVEL_OUTOF10: 6
PAINLEVEL_OUTOF10: 8
PAINLEVEL_OUTOF10: 7
PAINLEVEL_OUTOF10: 4
PAINLEVEL_OUTOF10: 4
PAINLEVEL_OUTOF10: 9
PAINLEVEL_OUTOF10: 6

## 2023-08-14 ASSESSMENT — PAIN - FUNCTIONAL ASSESSMENT
PAIN_FUNCTIONAL_ASSESSMENT: PREVENTS OR INTERFERES SOME ACTIVE ACTIVITIES AND ADLS
PAIN_FUNCTIONAL_ASSESSMENT: PREVENTS OR INTERFERES SOME ACTIVE ACTIVITIES AND ADLS

## 2023-08-14 ASSESSMENT — PAIN DESCRIPTION - DESCRIPTORS
DESCRIPTORS: SORE
DESCRIPTORS: ACHING;DISCOMFORT
DESCRIPTORS: SORE

## 2023-08-14 ASSESSMENT — PAIN DESCRIPTION - FREQUENCY: FREQUENCY: CONTINUOUS

## 2023-08-14 ASSESSMENT — PAIN DESCRIPTION - ONSET: ONSET: ON-GOING

## 2023-08-14 ASSESSMENT — PAIN DESCRIPTION - PAIN TYPE: TYPE: ACUTE PAIN;SURGICAL PAIN;CHRONIC PAIN

## 2023-08-14 NOTE — PROGRESS NOTES
OCCUPATIONAL THERAPY INITIAL EVALUATION    CAITLIN Amadordemario [address] [place] [address]      Date:2023                                                  Patient Name: Milton Lee  MRN: [id number]  : 1955  Room: [address] [place] [address]    Evaluating OT: Maki Hernandez, [facility], OTR/L [id number]  Referring Jenniffer Marino MD  Specific Provider Orders: OT eval and treat   Recommended Adaptive Equipment: LB AE     Diagnosis: PE   Surgery:   for LLE DVT  Pertinent Medical History: L4-L5 facetectomy and foraminotomy , HTN, thyroid disease, Hep C  Precautions:  Fall Risk, spinal    Assessment of current deficits   [x] Functional mobility  [x]ADLs  [x] Strength               [x]Cognition   [x] Functional transfers   [x] IADLs         [x] Safety Awareness   [x]Endurance   [] Fine Coordination              [x] Balance      [] Vision/perception   [x]Sensation    []Gross Motor Coordination  [] ROM  [] Delirium                   [] Motor Control     OT PLAN OF CARE   OT POC based on physician orders, patient diagnosis and results of clinical assessment    Frequency/Duration: 2-3 days/wk for 2 weeks PRN   Specific OT Treatment to include:   * Instruction/training on adapted ADL techniques and AE recommendations to increase functional independence within precautions       * Training on energy conservation strategies, correct breathing pattern and techniques to improve independence/tolerance for self-care routine  * Functional transfer/mobility training/DME recommendations for increased independence, safety, and fall prevention  * Patient/Family education to increase follow through with safety techniques and functional independence  * Recommendation of environmental modifications for increased safety with functional transfers/mobility and ADLs  * Therapeutic exercise to improve motor endurance, ROM, and functional strength for ADLs/functional transfers  * Therapeutic activities to ADLs/functional transfers, see above for assessment. Pt re-educated on spinal precautions. Pt appeared to have tolerated session well and appears motivated/cooperative/pleasant . Pt instructed on use of call light for assistance and fall prevention. Pt demo'ing fair understanding of education provided. Continue to educate. Eval Complexity: Low    Rehab Potential: Good for established goals, pt. assisted in establishment of goals. LTG: maximize independence with ADLs to return to PLOF    Patient instructed on diagnosis, prognosis/goals and plan of care. Demonstrated fair understanding. [] Malnutrition indicators have been identified and nursing has been notified to ensure a dietitian consult is ordered. Evaluation time includes thorough review of current medical information, gathering information on past medical & social history & PLOF, completion of standardized testing, informal observation of tasks, consultation with other medical professions/disciplines, assessment of data & development of POC/goals.      Time In: 1240       Time Out: 1255     Total treatment time: 0       Treatment Charges: Mins Units   OT Eval Low 49222 X    OT Eval Medium 10921     OT Eval High [id number]     OT Re-Eval [id number]     Ther Ex  97658       Manual Therapy 69581       Thera Activities 23444       ADL/Home Mgt 02071     Neuro Re-ed 35044       Group Therapy        Orthotic manage/training  48295       Non-Billable Time           Annie Brown OTR/L [id number]

## 2023-08-14 NOTE — PROGRESS NOTES
Physical Therapy  Physical Therapy Initial Assessment     Name: John Julio  : 1955  MRN: [id number]      Date of Service: 2023    Evaluating PT:  Joey Nayak PT, DPT    Room #:  9861/2062-B  Diagnosis:  Acute pulmonary embolism without acute cor pulmonale, unspecified pulmonary embolism type ([address]) [I26.99]  PMHx/PSHx:  HTN, PE, thyroid disease, Hep C, s/p L4-L5 facetectomy and foraminotomy   Procedure/Surgery:  N/A  Precautions:  spine, fall risk, LLE DVT  Equipment Needs:  TBD    SUBJECTIVE:    Pt lives with son in a 1 story home with ramped entry. Bed/bath is on 1st floor. Pt ambulated with no AD PTA. OBJECTIVE:   Initial Evaluation  Date: 23 Treatment Short Term/ Long Term   Goals   AM-PAC 6 Clicks      Was pt agreeable to Eval/treatment? yes     Does pt have pain?  8/10 LLE     Bed Mobility  Rolling: SBA  Supine to sit: SBA  Sit to supine: SBA  Scooting: SBA  Rolling: mod I  Supine to sit: mod I  Sit to supine: mod I  Scooting: mod I   Transfers Sit to stand: SBA  Stand to sit: SBA  Stand pivot: SBA with ww  Sit to stand: mod I  Stand to sit: mod I  Stand pivot: mod I with AAD   Ambulation    10'x2 with ww SBA  150'+ with AAD mod I   Stair negotiation: ascended and descended  NT       Strength/ROM:   RLE grossly 4/5  LLE grossly 3/5  BLE AROM WFL    Balance:   Static Sitting: IND  Dynamic Sitting: Supervision  Static Standing: Supervision with ww  Dynamic Standing: SBA with ww    Pt is A & O x 3  Sensation:  Pt denies numbness and tingling to extremities  Edema:  LLE swelling    Vitals:  SpO2 and HR were stable during session    Therapeutic Exercises:    Bed mobility: supine<>sit, cued for EOB positioning  Transfers: STS x2, cued for hand placement and postural correction  Ambulation: 10'x2 with ww  BLE AROM    Patient education  Pt educated on role of PT, importance of functional mobility during hospital stay, safety with functional mobility    Patient response to education:

## 2023-08-14 NOTE — FLOWSHEET NOTE
1652 Became nauseated,sob,\"sick feeling and hot,\"with tachycardia and rash after ancef given as ordered.   Agapito NP notified,orders obtained and ancef added to allergy list.

## 2023-08-14 NOTE — PROGRESS NOTES
[facility] Quality Flow/Interdisciplinary Rounds Progress Note        Quality Flow Rounds held on August 14, 2023    Disciplines Attending:  Bedside Nurse, , , and Nursing Unit Leadership    John Julio was admitted on 8/10/2023 11:59 AM    Anticipated Discharge Date:       Disposition:    Silvano Score:  Silvano Scale Score: 18    Readmission Risk              Risk of Unplanned Readmission:  17           Discussed patient goal for the day, patient clinical progression, and barriers to discharge.   The following Goal(s) of the Day/Commitment(s) have been identified:  Labs - Report Results      Vimal Beatty RN  August 14, 2023

## 2023-08-14 NOTE — CARE COORDINATION
8/14/23  Transition of care update. Patient admitted for acute pulmonary embolism and left lower extremity DVT. Patient is being followed by ID who is monitoring off ATB. Patient continues on a Heparin drip with vascular following for any potential surgical interventions. Patient is active with [facility] home care and plans to return home and resume services for skilled nursing and therapy post discharge. Family to provide transport home at discharge. SW/SAMUEL to follow.     Electronically signed by DIONTE Lal on 8/14/2023 at 11:35 AM

## 2023-08-14 NOTE — FLOWSHEET NOTE
1700 Toes both feet dusky with pale cyanosis,pulses remain unchanged,feet warm . EN Bermeo NP notified. Continue to monitor.

## 2023-08-14 NOTE — FLOWSHEET NOTE
1532 Admitted to [phone] from CVL post Ekos insertion. Awake and alert. Vss.Drip to Ekos as ordered. LLE pulses palpable,foot warm. Bedrest and Npo instructed,pt.verbalized understanding of.

## 2023-08-14 NOTE — PROCEDURES
Cassie Houserer  1955    Cardiovascular Lab    DATE OF PROCEDURE: 8/14/2023     PHYSICIAN: Fifi Box M.D.     ASSISTANT:      PRE-PROCEDURE DIAGNOSIS: Vena Cava thrombosis. POST-PROCEDURE DIAGNOSIS: Same    PROCEDURE: Left transpopliteal placement of IVC catheter, initiation of intra-venous thrombolysis    ANESTHESIA: Local with intravenous sedation. ESTIMATED BLOOD LOSS: Minimal     COMPLICATIONS: None    DESCRIPTION OF PROCEDURE: The patient was identified and the procedure was confirmed. The patient was placed prone upon the table and the posterior aspect of the left knee was prepped and draped in the usual sterile fashion. Lidocaine 1% for local anesthesia. The left popliteal vein was percutaneously entered and a 6-Swiss sheath was inserted. A Glidewire was advanced into the vena cava up to the level of the filter. A cavogram was performed that did show a filling defect inside the filter. A 50 cm treatment length EKOS catheter was inserted into the vena cava and secured in place. The sheath was sutured to the skin and TPA infusion was begun through the catheter. A sterile pressure dressing was applied over the sheath. The patient tolerated the procedure and was transferred to the CVICU in satisfactory condition.

## 2023-08-15 ENCOUNTER — APPOINTMENT (OUTPATIENT)
Dept: CARDIAC CATH/INVASIVE PROCEDURES | Age: 68
End: 2023-08-15
Payer: MEDICARE

## 2023-08-15 ENCOUNTER — APPOINTMENT (OUTPATIENT)
Dept: GENERAL RADIOLOGY | Age: 68
End: 2023-08-15
Payer: MEDICARE

## 2023-08-15 LAB
ANION GAP SERPL CALCULATED.3IONS-SCNC: 12 MMOL/L (ref 7–16)
B.E.: -5.1 MMOL/L (ref -3–3)
BASOPHILS # BLD: 0.09 K/UL (ref 0–0.2)
BASOPHILS NFR BLD: 1 % (ref 0–2)
BUN SERPL-MCNC: 21 MG/DL (ref 6–23)
CALCIUM SERPL-MCNC: 8.3 MG/DL (ref 8.6–10.2)
CHLORIDE SERPL-SCNC: 101 MMOL/L (ref 98–107)
CO2 SERPL-SCNC: 21 MMOL/L (ref 22–29)
COHB: 0.8 % (ref 0–1.5)
CREAT SERPL-MCNC: 1.4 MG/DL (ref 0.5–1)
CRITICAL: ABNORMAL
DATE ANALYZED: ABNORMAL
DATE OF COLLECTION: ABNORMAL
EOSINOPHIL # BLD: 0.11 K/UL (ref 0.05–0.5)
EOSINOPHILS RELATIVE PERCENT: 1 % (ref 0–6)
ERYTHROCYTE [DISTWIDTH] IN BLOOD BY AUTOMATED COUNT: 13.2 % (ref 11.5–15)
ERYTHROCYTE [DISTWIDTH] IN BLOOD BY AUTOMATED COUNT: 13.2 % (ref 11.5–15)
ERYTHROCYTE [DISTWIDTH] IN BLOOD BY AUTOMATED COUNT: 13.4 % (ref 11.5–15)
FIBRINOGEN PPP-MCNC: 66 MG/DL (ref 200–400)
FIBRINOGEN PPP-MCNC: 67 MG/DL (ref 200–400)
FIBRINOGEN PPP-MCNC: 73 MG/DL (ref 200–400)
FIBRINOGEN PPP-MCNC: 93 MG/DL (ref 200–400)
GFR SERPL CREATININE-BSD FRML MDRD: 41 ML/MIN/1.73M2
GLUCOSE SERPL-MCNC: 112 MG/DL (ref 74–99)
HCO3: 19 MMOL/L (ref 22–26)
HCT VFR BLD AUTO: 25.4 % (ref 34–48)
HCT VFR BLD AUTO: 29.1 % (ref 34–48)
HCT VFR BLD AUTO: 29.5 % (ref 34–48)
HGB BLD-MCNC: 7.5 G/DL (ref 11.5–15.5)
HGB BLD-MCNC: 8.8 G/DL (ref 11.5–15.5)
HGB BLD-MCNC: 8.9 G/DL (ref 11.5–15.5)
HHB: 5.8 % (ref 0–5)
IMM GRANULOCYTES # BLD AUTO: 0.72 K/UL (ref 0–0.58)
IMM GRANULOCYTES NFR BLD: 4 % (ref 0–5)
LAB: ABNORMAL
LYMPHOCYTES NFR BLD: 1.52 K/UL (ref 1.5–4)
LYMPHOCYTES RELATIVE PERCENT: 8 % (ref 20–42)
Lab: ABNORMAL
MCH RBC QN AUTO: 29.6 PG (ref 26–35)
MCH RBC QN AUTO: 29.6 PG (ref 26–35)
MCH RBC QN AUTO: 29.9 PG (ref 26–35)
MCHC RBC AUTO-ENTMCNC: 29.5 G/DL (ref 32–34.5)
MCHC RBC AUTO-ENTMCNC: 30.2 G/DL (ref 32–34.5)
MCHC RBC AUTO-ENTMCNC: 30.2 G/DL (ref 32–34.5)
MCV RBC AUTO: 100.4 FL (ref 80–99.9)
MCV RBC AUTO: 98 FL (ref 80–99.9)
MCV RBC AUTO: 99 FL (ref 80–99.9)
METHB: 0.2 % (ref 0–1.5)
MODE: ABNORMAL
MONOCYTES NFR BLD: 1.18 K/UL (ref 0.1–0.95)
MONOCYTES NFR BLD: 7 % (ref 2–12)
NEUTROPHILS NFR BLD: 80 % (ref 43–80)
NEUTS SEG NFR BLD: 14.62 K/UL (ref 1.8–7.3)
O2 CONTENT: 13.6 ML/DL
O2 SATURATION: 94.1 % (ref 92–98.5)
O2HB: 93.2 % (ref 94–97)
OPERATOR ID: 7221
PARTIAL THROMBOPLASTIN TIME: 38.2 SEC (ref 24.5–35.1)
PARTIAL THROMBOPLASTIN TIME: 44 SEC (ref 24.5–35.1)
PARTIAL THROMBOPLASTIN TIME: 45.6 SEC (ref 24.5–35.1)
PATIENT TEMP: 37 C
PCO2: 31.6 MMHG (ref 35–45)
PH BLOOD GAS: 7.4 (ref 7.35–7.45)
PLATELET # BLD AUTO: 258 K/UL (ref 130–450)
PLATELET # BLD AUTO: 295 K/UL (ref 130–450)
PLATELET # BLD AUTO: 301 K/UL (ref 130–450)
PLATELET CONFIRMATION: NORMAL
PLATELET CONFIRMATION: NORMAL
PMV BLD AUTO: 10.2 FL (ref 7–12)
PMV BLD AUTO: 10.3 FL (ref 7–12)
PMV BLD AUTO: 10.4 FL (ref 7–12)
PO2: 74 MMHG (ref 75–100)
POTASSIUM SERPL-SCNC: 4 MMOL/L (ref 3.5–5)
RBC # BLD AUTO: 2.53 M/UL (ref 3.5–5.5)
RBC # BLD AUTO: 2.97 M/UL (ref 3.5–5.5)
RBC # BLD AUTO: 2.98 M/UL (ref 3.5–5.5)
RBC # BLD: ABNORMAL 10*6/UL
SODIUM SERPL-SCNC: 134 MMOL/L (ref 132–146)
SOURCE, BLOOD GAS: ABNORMAL
THB: 10.3 G/DL (ref 11.5–16.5)
TIME ANALYZED: 605
WBC OTHER # BLD: 14.9 K/UL (ref 4.5–11.5)
WBC OTHER # BLD: 16.9 K/UL (ref 4.5–11.5)
WBC OTHER # BLD: 18.2 K/UL (ref 4.5–11.5)

## 2023-08-15 PROCEDURE — 85025 COMPLETE CBC W/AUTO DIFF WBC: CPT

## 2023-08-15 PROCEDURE — 37214 CESSJ THERAPY CATH REMOVAL: CPT | Performed by: SURGERY

## 2023-08-15 PROCEDURE — 2500000003 HC RX 250 WO HCPCS

## 2023-08-15 PROCEDURE — 6360000002 HC RX W HCPCS

## 2023-08-15 PROCEDURE — 2580000003 HC RX 258: Performed by: SURGERY

## 2023-08-15 PROCEDURE — 2000000000 HC ICU R&B

## 2023-08-15 PROCEDURE — 80048 BASIC METABOLIC PNL TOTAL CA: CPT

## 2023-08-15 PROCEDURE — 6360000002 HC RX W HCPCS: Performed by: NURSE PRACTITIONER

## 2023-08-15 PROCEDURE — 6370000000 HC RX 637 (ALT 250 FOR IP): Performed by: SURGERY

## 2023-08-15 PROCEDURE — 2709999900 HC NON-CHARGEABLE SUPPLY

## 2023-08-15 PROCEDURE — 2580000003 HC RX 258: Performed by: NURSE PRACTITIONER

## 2023-08-15 PROCEDURE — 37214 CESSJ THERAPY CATH REMOVAL: CPT

## 2023-08-15 PROCEDURE — 71045 X-RAY EXAM CHEST 1 VIEW: CPT

## 2023-08-15 PROCEDURE — 6370000000 HC RX 637 (ALT 250 FOR IP): Performed by: NURSE PRACTITIONER

## 2023-08-15 PROCEDURE — 06FY3Z0 FRAGMENTATION OF LOWER VEIN, PERCUTANEOUS APPROACH, ULTRASONIC: ICD-10-PCS | Performed by: SURGERY

## 2023-08-15 PROCEDURE — 6360000002 HC RX W HCPCS: Performed by: SURGERY

## 2023-08-15 PROCEDURE — 06PY33Z REMOVAL OF INFUSION DEVICE FROM LOWER VEIN, PERCUTANEOUS APPROACH: ICD-10-PCS | Performed by: SURGERY

## 2023-08-15 PROCEDURE — C1769 GUIDE WIRE: HCPCS

## 2023-08-15 PROCEDURE — 85384 FIBRINOGEN ACTIVITY: CPT

## 2023-08-15 PROCEDURE — 82805 BLOOD GASES W/O2 SATURATION: CPT

## 2023-08-15 PROCEDURE — 36415 COLL VENOUS BLD VENIPUNCTURE: CPT

## 2023-08-15 PROCEDURE — 85027 COMPLETE CBC AUTOMATED: CPT

## 2023-08-15 PROCEDURE — 85730 THROMBOPLASTIN TIME PARTIAL: CPT

## 2023-08-15 RX ORDER — HYDROMORPHONE HYDROCHLORIDE 1 MG/ML
0.5 INJECTION, SOLUTION INTRAMUSCULAR; INTRAVENOUS; SUBCUTANEOUS
Status: DISCONTINUED | OUTPATIENT
Start: 2023-08-15 | End: 2023-08-15

## 2023-08-15 RX ORDER — HYDROMORPHONE HYDROCHLORIDE 1 MG/ML
1 INJECTION, SOLUTION INTRAMUSCULAR; INTRAVENOUS; SUBCUTANEOUS
Status: DISCONTINUED | OUTPATIENT
Start: 2023-08-15 | End: 2023-08-15

## 2023-08-15 RX ORDER — HYDROMORPHONE HYDROCHLORIDE 1 MG/ML
1 INJECTION, SOLUTION INTRAMUSCULAR; INTRAVENOUS; SUBCUTANEOUS
Status: DISCONTINUED | OUTPATIENT
Start: 2023-08-15 | End: 2023-08-17 | Stop reason: HOSPADM

## 2023-08-15 RX ORDER — DOCUSATE SODIUM 100 MG/1
100 CAPSULE, LIQUID FILLED ORAL DAILY
Status: DISCONTINUED | OUTPATIENT
Start: 2023-08-15 | End: 2023-08-17 | Stop reason: HOSPADM

## 2023-08-15 RX ORDER — SENNOSIDES A AND B 8.6 MG/1
1 TABLET, FILM COATED ORAL NIGHTLY
Status: DISCONTINUED | OUTPATIENT
Start: 2023-08-15 | End: 2023-08-17 | Stop reason: HOSPADM

## 2023-08-15 RX ORDER — HYDROMORPHONE HYDROCHLORIDE 1 MG/ML
0.5 INJECTION, SOLUTION INTRAMUSCULAR; INTRAVENOUS; SUBCUTANEOUS
Status: DISCONTINUED | OUTPATIENT
Start: 2023-08-15 | End: 2023-08-17 | Stop reason: HOSPADM

## 2023-08-15 RX ORDER — NALOXONE HYDROCHLORIDE 0.4 MG/ML
0.4 INJECTION, SOLUTION INTRAMUSCULAR; INTRAVENOUS; SUBCUTANEOUS PRN
Status: DISCONTINUED | OUTPATIENT
Start: 2023-08-15 | End: 2023-08-17 | Stop reason: HOSPADM

## 2023-08-15 RX ADMIN — ANTI-FUNGAL POWDER MICONAZOLE NITRATE TALC FREE: 1.42 POWDER TOPICAL at 09:58

## 2023-08-15 RX ADMIN — FENTANYL CITRATE 50 MCG: 50 INJECTION INTRAMUSCULAR; INTRAVENOUS at 06:20

## 2023-08-15 RX ADMIN — HYDROMORPHONE HYDROCHLORIDE 0.5 MG: 1 INJECTION, SOLUTION INTRAMUSCULAR; INTRAVENOUS; SUBCUTANEOUS at 18:25

## 2023-08-15 RX ADMIN — DOCUSATE SODIUM 100 MG: 100 CAPSULE, LIQUID FILLED ORAL at 14:58

## 2023-08-15 RX ADMIN — PRAVASTATIN SODIUM 40 MG: 20 TABLET ORAL at 08:41

## 2023-08-15 RX ADMIN — ALTEPLASE 1 MG/HR: 2.2 INJECTION, POWDER, LYOPHILIZED, FOR SOLUTION INTRAVENOUS at 02:00

## 2023-08-15 RX ADMIN — ANTI-FUNGAL POWDER MICONAZOLE NITRATE TALC FREE: 1.42 POWDER TOPICAL at 21:06

## 2023-08-15 RX ADMIN — SODIUM CHLORIDE, PRESERVATIVE FREE 10 ML: 5 INJECTION INTRAVENOUS at 09:58

## 2023-08-15 RX ADMIN — AMLODIPINE BESYLATE 5 MG: 5 TABLET ORAL at 08:41

## 2023-08-15 RX ADMIN — VANCOMYCIN HYDROCHLORIDE 1500 MG: 10 INJECTION, POWDER, LYOPHILIZED, FOR SOLUTION INTRAVENOUS at 00:05

## 2023-08-15 RX ADMIN — Medication 2000 UNITS: at 08:41

## 2023-08-15 RX ADMIN — RIVAROXABAN 15 MG: 15 TABLET, FILM COATED ORAL at 18:20

## 2023-08-15 RX ADMIN — SODIUM CHLORIDE, PRESERVATIVE FREE 10 ML: 5 INJECTION INTRAVENOUS at 21:07

## 2023-08-15 RX ADMIN — LEVOTHYROXINE SODIUM 125 MCG: 0.12 TABLET ORAL at 06:20

## 2023-08-15 RX ADMIN — FENTANYL CITRATE 50 MCG: 50 INJECTION INTRAMUSCULAR; INTRAVENOUS at 02:00

## 2023-08-15 RX ADMIN — SENNOSIDES 8.6 MG: 8.6 TABLET, FILM COATED ORAL at 21:07

## 2023-08-15 RX ADMIN — CYANOCOBALAMIN TAB 1000 MCG 1000 MCG: 1000 TAB at 08:41

## 2023-08-15 RX ADMIN — ONDANSETRON 4 MG: 2 INJECTION INTRAMUSCULAR; INTRAVENOUS at 11:16

## 2023-08-15 RX ADMIN — FOLIC ACID 1 MG: 1 TABLET ORAL at 08:41

## 2023-08-15 RX ADMIN — MORPHINE SULFATE 1 MG: 2 INJECTION, SOLUTION INTRAMUSCULAR; INTRAVENOUS at 09:01

## 2023-08-15 RX ADMIN — HYDROMORPHONE HYDROCHLORIDE 0.5 MG: 1 INJECTION, SOLUTION INTRAMUSCULAR; INTRAVENOUS; SUBCUTANEOUS at 21:28

## 2023-08-15 ASSESSMENT — PAIN SCALES - GENERAL
PAINLEVEL_OUTOF10: 5
PAINLEVEL_OUTOF10: 0
PAINLEVEL_OUTOF10: 8
PAINLEVEL_OUTOF10: 3
PAINLEVEL_OUTOF10: 5
PAINLEVEL_OUTOF10: 7
PAINLEVEL_OUTOF10: 7
PAINLEVEL_OUTOF10: 0
PAINLEVEL_OUTOF10: 2
PAINLEVEL_OUTOF10: 0
PAINLEVEL_OUTOF10: 6
PAINLEVEL_OUTOF10: 0
PAINLEVEL_OUTOF10: 7
PAINLEVEL_OUTOF10: 0
PAINLEVEL_OUTOF10: 0
PAINLEVEL_OUTOF10: 7
PAINLEVEL_OUTOF10: 8
PAINLEVEL_OUTOF10: 8
PAINLEVEL_OUTOF10: 4
PAINLEVEL_OUTOF10: 5

## 2023-08-15 ASSESSMENT — PAIN DESCRIPTION - LOCATION
LOCATION: LEG
LOCATION: BACK

## 2023-08-15 ASSESSMENT — PAIN DESCRIPTION - DESCRIPTORS
DESCRIPTORS: SORE
DESCRIPTORS: CRAMPING

## 2023-08-15 ASSESSMENT — PAIN DESCRIPTION - ORIENTATION: ORIENTATION: LEFT

## 2023-08-15 NOTE — PROGRESS NOTES
[facility]  Hematology/Oncology  Consult      Patient Name: Bart Fox  YOB: 1955  PCP: MELANIE Evans NP   Referring Provider:      Reason for Consultation:   Chief Complaint   Patient presents with    Leg Pain     Left leg pain and swelling, has been off of thinner since back surgery on 27th Interval history: status post removal of ekos today. I/n SICU    History of Present Illness: 79-year-old woman with multiple admissions lately. Apparently patient has history of abnormal primary thrombophilia and recurrent DVTs and PEs in the past and there is mention of factor V Leiden mutation in her EMR but no lab documentation. She had been on long-term anticoagulation with Xarelto. On 7/26/2023 she had a lumbar laminectomy and she was taken off Xarelto preoperatively and given Lovenox prophylaxis. Postoperatively she was readmitted with septic shock and required IV antibiotics and developed myelosuppression from sepsis and thrombocytopenia and her Lovenox was held at that time and the Xarelto was kept on hold. She was again hospitalized through ED on 8/10/2023 with left leg pain and swelling. Doppler ultrasound studies showed recurrent occlusive DVT involving the common femoral vein, saphenofemoral junction superficial femoral and popliteal veins. CTA of the chest showed small left lower pulmonary emboli and no evidence of cardiac strain  She has been initiated on heparin drip and is hemodynamically stable. Her CBC shows moderate neutrophilic leukocytosis with a hemoglobin of 9 MCV of 98 and normal platelet count of 427. Hepatic panel relevant for hypoalbuminemia. BMP shows a serum creatinine of 1.6 with an estimated GFR of 40 mL/min  On reviewing her EMR, no thrombophilia panel is noted but her serum homocysteine level done on 8/3/2023 was elevated at 23.     Diagnostic Data:     Past Medical History:   Diagnosis Date    H/O cardiovascular stress test 05/04/2021 after a detailed explanation of the procedure including risks, benefits, and alternatives. Universal protocol was observed. Sterile gowns, masks, hats and gloves utilized for maximal sterile barrier. FINDINGS: Under CT guidance a 17 gauge introducer needle was used to access a subcutaneous fluid collection along the posterior spinal region. 10 cc of dark red fluid was aspirated and sent for culture and sensitivity. Successful CT-guided aspiration of a fluid collection. Pathology results are pending. The patient will follow-up with the referring clinical service. US DUP LOWER EXTREMITY LEFT MIKAYLA    Result Date: 8/10/2023  EXAMINATION: DUPLEX VENOUS ULTRASOUND OF THE LEFT LOWER EXTREMITY 8/10/2023 1:05 pm TECHNIQUE: Duplex ultrasound using B-mode/gray scaled imaging and Doppler spectral analysis and color flow was obtained of the deep venous structures of the left lower extremity. COMPARISON: None. HISTORY: ORDERING SYSTEM PROVIDED HISTORY: pain TECHNOLOGIST PROVIDED HISTORY: Reason for exam:->pain What reading provider will be dictating this exam?->CRC FINDINGS: Occlusive DVT present involving the common femoral vein, saphenofemoral junction, superficial femoral vein, popliteal vein, tibioperoneal trunk, and proximal calf veins. Extensive occlusive DVT present. FLUORO FOR SURGICAL PROCEDURES    Result Date: 7/27/2023  EXAMINATION: SPOT FLUOROSCOPIC IMAGES 7/27/2023 11:03 am TECHNIQUE: Fluoroscopy was provided by the radiology department for procedure. Radiologist was not present during examination. FLUOROSCOPY DOSE AND TYPE: Radiation Exposure Index: Fluoroscopy time equals 9.7 seconds. Total dose equals 8.56 mGy, COMPARISON: None HISTORY: ORDERING SYSTEM PROVIDED HISTORY: lumbar laminectomy TECHNOLOGIST PROVIDED HISTORY: Reason for exam:->lumbar laminectomy What reading provider will be dictating this exam?->CRC Intraprocedural imaging.  FINDINGS: 3 intraoperative spot images of the lumbar spine consult, we will follow.       Zakiya WILHELM  Electronically signed 8/15/2023 at 2:57 PM   Patient seen, note edited to reflect above  Brianna Foy MD

## 2023-08-15 NOTE — FLOWSHEET NOTE
Pt ekos alteplase stopped per verbal order from [facility] np. Pt fibrinogen was 73. Pt is alert and oriented. No signs of bleeding. Surgeon contacted and orders to turn off for one hour and recheck levels.

## 2023-08-15 NOTE — PROGRESS NOTES
0330 - Critical care notified of critical labs for patient from the fibrinogen and the CBC. Verbal orders given to cut the TPA down by half and redraw blood work. TPA currently at 1 mg/hr. Placed to 0.5 mg/hr. Will continue to monitor patient and watch for new results. 0500- Repeat fibrinogen 93. Critical care notified. Resident at bedside. Decision to keep current rate at 0.5 mg/hr and to continue checking every 6 hours while EKOS catheter is in place.

## 2023-08-15 NOTE — OP NOTE
Cardiovascular Lab Procedure Report    Yissel Christine  1955    Date : 8/15/2023  Surgeon: Gus Baires M.D. Pre-procedure Diagnosis: Left lower extremity DVT  Post-procedure Diagnosis: Same  Procedure:    [id number] FU  Venogram of IVC and Left lower extremity with existing lysis catheter and removal of catheter   Anesthesia: Local with IV sedation  Assistants: Cath Lab Staff  Estimated Blood Loss: Minimal  Complications: none  Findings[de-identified]    Left    Inferior Vena Cava Patent   Common Iliac Vein 50% residual thrombus but flow present   External Iliac Vein Patent, proximal residual thrombus 70%   Common Femoral Vein Patent, distally 70% residual thrombus   Superficial Femoral Vein Patent     Procedure Details :  Timeout preformed identifying pt and procedure. Left popliteal region prepped and draped in sterile fashion. Patient given sedation as needed throughout the case. Preexisting 6 fr sheath with EKOS lsysis catheter was already in place in the Left popliteal vein. Imaging preformed through the lysis catheter.       Ekos catheter removed    Plan  Due to concerns of dropping fibrinogen and significant improvement in thrombus burden and patient sxs decision was made to stop lysis  Will restart full dose antiocagulation later today    Gus Baires MD

## 2023-08-15 NOTE — FLOWSHEET NOTE
Pt ekcos catheter is running. All lines are connected and functioning correctly. No complications at this time.

## 2023-08-15 NOTE — PROGRESS NOTES
Comprehensive Nutrition Assessment    Type and Reason for Visit:  Initial (ICU LOS)    Nutrition Recommendations/Plan:   Continue NPO, ADAT once med appropriate. Will Start ONS once diet adv and monitor. Malnutrition Assessment:  Malnutrition Status:  Insufficient data (08/15/23 1411)    Context:  Acute Illness     Findings of the 6 clinical characteristics of malnutrition:  Energy Intake:  50% or less of estimated energy requirements for 5 or more days  Weight Loss:  No significant weight loss     Body Fat Loss:  Unable to assess (PAMELA for cath lab at this time)     Muscle Mass Loss:  Unable to assess    Fluid Accumulation:  Unable to assess (2/2 CKD)     Strength:  Not Performed    Nutrition Assessment:    Pt adm w/ SOB/LLE edema x ~2d pta. PMHx Hep C, thyroid dz, HTN, previous PE, CKD, recent Chaka-Lami (7/27). Adm w/ PE, suspected SIRS and LLE DVT, s/p IVC 8/14. At risk d/t increased needs for wound healing w/ noted poor luis daniel/intake since adm. Will Start ONS once diet advanced and monitor. Nutrition Related Findings:    A&O, upper dentures, Abd WDL, Hypo BS, +1/2 edema, I/O's WNL, elevated Cr/BGL/LFTs Wound Type: Surgical Incision       Current Nutrition Intake & Therapies:    Average Meal Intake: NPO, 1-25%  Average Supplements Intake: None Ordered  Diet NPO Exceptions are: Sips of Water with Meds    Anthropometric Measures:  Height: 5' 7\" (170.2 cm)  Ideal Body Weight (IBW): 135 lbs (61 kg)    Admission Body Weight: 242 lb (109.8 kg) (stand scale 8/11)  Current Body Weight: 242 lb (109.8 kg) (stand scale 8/11),   IBW.  Weight Source: Standing Scale  Current BMI (kg/m2): 37.9  Usual Body Weight: 238 lb (108 kg) (actual 7/20/23 per EMR)  % Weight Change (Calculated): 1.7  Weight Adjustment For: No Adjustment                 BMI Categories: Obese Class 2 (BMI 35.0 -39.9)    Estimated Daily Nutrient Needs:  Energy Requirements Based On: Formula  Weight Used for Energy Requirements: Current  Energy

## 2023-08-15 NOTE — FLOWSHEET NOTE
Pt returned from cath lab. Pt is A&O*4. Pt is able to follow commands. Pt has no signs of bleeding at the insertion site. Pulses in all extremities palpable. All extremities are pink and warm. Pt has no pain at this time.

## 2023-08-15 NOTE — CARE COORDINATION
8/15 Care Coordination: Pt in [facility], post EKOS 8/14. Vena cava thrombosis. tPA/heparin infusing via EKOS catheter. Plan to return to [facility] for angiogram this afternoon. Patient is active with [facility] home care and plans to return home and resume services for skilled nursing and therapy post discharge. Family to provide transport home at discharge. Will need BACILIO orders when discharged. CM/SW will continue to follow for discharge planning.    Nydia SUAREZN,RN--BC  [phone]

## 2023-08-15 NOTE — PLAN OF CARE
Problem: Discharge Planning  Goal: Discharge to home or other facility with appropriate resources  8/15/2023 3805 by Ivan Oliver RN  Outcome: Progressing  Flowsheets (Taken 8/15/2023 1911)  Discharge to home or other facility with appropriate resources:   Identify barriers to discharge with patient and caregiver   Arrange for needed discharge resources and transportation as appropriate   Identify discharge learning needs (meds, wound care, etc)   Arrange for interpreters to assist at discharge as needed   Refer to discharge planning if patient needs post-hospital services based on physician order or complex needs related to functional status, cognitive ability or social support system  Note: Pt barriers being identified   8/14/2023 2245 by Sunny Casillas RN  Outcome: Progressing     Problem: Pain  Goal: Verbalizes/displays adequate comfort level or baseline comfort level  8/14/2023 2245 by Sunny Casillas RN  Outcome: Progressing     Problem: Safety - Adult  Goal: Free from fall injury  8/15/2023 0922 by Ivan Oliver RN  Outcome: Progressing  Flowsheets (Taken 8/15/2023 0922)  Free From Fall Injury: Instruct family/caregiver on patient safety  Note: Pt educated on safety. 8/14/2023 2245 by Sunny Casillas RN  Outcome: Progressing     Problem: ABCDS Injury Assessment  Goal: Absence of physical injury  8/15/2023 3808 by Ivan Oliver RN  Outcome: Progressing  Flowsheets (Taken 8/15/2023 6499)  Absence of Physical Injury: Implement safety measures based on patient assessment  Note: Pt safety plan in place.    8/14/2023 2245 by Sunny Casillas RN  Outcome: Progressing

## 2023-08-16 LAB
ANION GAP SERPL CALCULATED.3IONS-SCNC: 11 MMOL/L (ref 7–16)
BUN SERPL-MCNC: 20 MG/DL (ref 6–23)
CALCIUM SERPL-MCNC: 8.2 MG/DL (ref 8.6–10.2)
CHLORIDE SERPL-SCNC: 102 MMOL/L (ref 98–107)
CO2 SERPL-SCNC: 21 MMOL/L (ref 22–29)
CREAT SERPL-MCNC: 1.6 MG/DL (ref 0.5–1)
ERYTHROCYTE [DISTWIDTH] IN BLOOD BY AUTOMATED COUNT: 13.4 % (ref 11.5–15)
GFR SERPL CREATININE-BSD FRML MDRD: 36 ML/MIN/1.73M2
GLUCOSE SERPL-MCNC: 86 MG/DL (ref 74–99)
HCT VFR BLD AUTO: 27.3 % (ref 34–48)
HGB BLD-MCNC: 8.1 G/DL (ref 11.5–15.5)
MCH RBC QN AUTO: 29.6 PG (ref 26–35)
MCHC RBC AUTO-ENTMCNC: 29.7 G/DL (ref 32–34.5)
MCV RBC AUTO: 99.6 FL (ref 80–99.9)
MICROORGANISM SPEC CULT: NORMAL
MICROORGANISM SPEC CULT: NORMAL
PLATELET # BLD AUTO: 237 K/UL (ref 130–450)
PMV BLD AUTO: 10 FL (ref 7–12)
POTASSIUM SERPL-SCNC: 3.7 MMOL/L (ref 3.5–5)
RBC # BLD AUTO: 2.74 M/UL (ref 3.5–5.5)
SERVICE CMNT-IMP: NORMAL
SERVICE CMNT-IMP: NORMAL
SODIUM SERPL-SCNC: 134 MMOL/L (ref 132–146)
SPECIMEN DESCRIPTION: NORMAL
SPECIMEN DESCRIPTION: NORMAL
WBC OTHER # BLD: 11.7 K/UL (ref 4.5–11.5)

## 2023-08-16 PROCEDURE — 6370000000 HC RX 637 (ALT 250 FOR IP): Performed by: STUDENT IN AN ORGANIZED HEALTH CARE EDUCATION/TRAINING PROGRAM

## 2023-08-16 PROCEDURE — 99231 SBSQ HOSP IP/OBS SF/LOW 25: CPT | Performed by: SURGERY

## 2023-08-16 PROCEDURE — 97535 SELF CARE MNGMENT TRAINING: CPT

## 2023-08-16 PROCEDURE — 97530 THERAPEUTIC ACTIVITIES: CPT

## 2023-08-16 PROCEDURE — 6370000000 HC RX 637 (ALT 250 FOR IP): Performed by: NURSE PRACTITIONER

## 2023-08-16 PROCEDURE — 6370000000 HC RX 637 (ALT 250 FOR IP): Performed by: SURGERY

## 2023-08-16 PROCEDURE — 2500000003 HC RX 250 WO HCPCS

## 2023-08-16 PROCEDURE — 2700000000 HC OXYGEN THERAPY PER DAY

## 2023-08-16 PROCEDURE — 97164 PT RE-EVAL EST PLAN CARE: CPT

## 2023-08-16 PROCEDURE — 97168 OT RE-EVAL EST PLAN CARE: CPT

## 2023-08-16 PROCEDURE — 2580000003 HC RX 258: Performed by: SURGERY

## 2023-08-16 PROCEDURE — 2000000000 HC ICU R&B

## 2023-08-16 PROCEDURE — 6360000002 HC RX W HCPCS

## 2023-08-16 PROCEDURE — 80048 BASIC METABOLIC PNL TOTAL CA: CPT

## 2023-08-16 PROCEDURE — 85027 COMPLETE CBC AUTOMATED: CPT

## 2023-08-16 RX ORDER — CYCLOBENZAPRINE HCL 10 MG
10 TABLET ORAL 3 TIMES DAILY PRN
Status: DISCONTINUED | OUTPATIENT
Start: 2023-08-16 | End: 2023-08-17 | Stop reason: HOSPADM

## 2023-08-16 RX ORDER — DIMETHICONE, OXYBENZONE, AND PADIMATE O 2; 2.5; 6.6 G/100G; G/100G; G/100G
STICK TOPICAL PRN
Status: DISCONTINUED | OUTPATIENT
Start: 2023-08-16 | End: 2023-08-17 | Stop reason: HOSPADM

## 2023-08-16 RX ADMIN — HYDROMORPHONE HYDROCHLORIDE 1 MG: 1 INJECTION, SOLUTION INTRAMUSCULAR; INTRAVENOUS; SUBCUTANEOUS at 20:54

## 2023-08-16 RX ADMIN — SODIUM CHLORIDE, PRESERVATIVE FREE 10 ML: 5 INJECTION INTRAVENOUS at 20:52

## 2023-08-16 RX ADMIN — RIVAROXABAN 15 MG: 15 TABLET, FILM COATED ORAL at 09:12

## 2023-08-16 RX ADMIN — LEVOTHYROXINE SODIUM 125 MCG: 0.12 TABLET ORAL at 06:23

## 2023-08-16 RX ADMIN — DOCUSATE SODIUM 100 MG: 100 CAPSULE, LIQUID FILLED ORAL at 09:12

## 2023-08-16 RX ADMIN — Medication 2000 UNITS: at 09:12

## 2023-08-16 RX ADMIN — SODIUM CHLORIDE, PRESERVATIVE FREE 10 ML: 5 INJECTION INTRAVENOUS at 09:12

## 2023-08-16 RX ADMIN — HYDROMORPHONE HYDROCHLORIDE 0.5 MG: 1 INJECTION, SOLUTION INTRAMUSCULAR; INTRAVENOUS; SUBCUTANEOUS at 04:33

## 2023-08-16 RX ADMIN — PRAVASTATIN SODIUM 40 MG: 20 TABLET ORAL at 09:11

## 2023-08-16 RX ADMIN — HYDROMORPHONE HYDROCHLORIDE 0.5 MG: 1 INJECTION, SOLUTION INTRAMUSCULAR; INTRAVENOUS; SUBCUTANEOUS at 09:19

## 2023-08-16 RX ADMIN — HYDROMORPHONE HYDROCHLORIDE 1 MG: 1 INJECTION, SOLUTION INTRAMUSCULAR; INTRAVENOUS; SUBCUTANEOUS at 14:32

## 2023-08-16 RX ADMIN — ANTI-FUNGAL POWDER MICONAZOLE NITRATE TALC FREE: 1.42 POWDER TOPICAL at 20:06

## 2023-08-16 RX ADMIN — ANTI-FUNGAL POWDER MICONAZOLE NITRATE TALC FREE: 1.42 POWDER TOPICAL at 09:12

## 2023-08-16 RX ADMIN — CYCLOBENZAPRINE 10 MG: 10 TABLET, FILM COATED ORAL at 15:55

## 2023-08-16 RX ADMIN — CYANOCOBALAMIN TAB 1000 MCG 1000 MCG: 1000 TAB at 09:12

## 2023-08-16 RX ADMIN — Medication: at 09:50

## 2023-08-16 RX ADMIN — HYDROMORPHONE HYDROCHLORIDE 1 MG: 1 INJECTION, SOLUTION INTRAMUSCULAR; INTRAVENOUS; SUBCUTANEOUS at 23:58

## 2023-08-16 RX ADMIN — FOLIC ACID 1 MG: 1 TABLET ORAL at 09:12

## 2023-08-16 RX ADMIN — HYDROMORPHONE HYDROCHLORIDE 0.5 MG: 1 INJECTION, SOLUTION INTRAMUSCULAR; INTRAVENOUS; SUBCUTANEOUS at 01:30

## 2023-08-16 RX ADMIN — RIVAROXABAN 15 MG: 15 TABLET, FILM COATED ORAL at 15:55

## 2023-08-16 ASSESSMENT — PAIN DESCRIPTION - PAIN TYPE
TYPE: ACUTE PAIN
TYPE: ACUTE PAIN

## 2023-08-16 ASSESSMENT — PAIN DESCRIPTION - DESCRIPTORS
DESCRIPTORS: ACHING;SORE;DISCOMFORT
DESCRIPTORS: ACHING
DESCRIPTORS: SORE
DESCRIPTORS: ACHING
DESCRIPTORS: ACHING;SORE;DISCOMFORT
DESCRIPTORS: DISCOMFORT;SORE

## 2023-08-16 ASSESSMENT — PAIN DESCRIPTION - FREQUENCY
FREQUENCY: CONTINUOUS
FREQUENCY: CONTINUOUS

## 2023-08-16 ASSESSMENT — PAIN - FUNCTIONAL ASSESSMENT: PAIN_FUNCTIONAL_ASSESSMENT: ACTIVITIES ARE NOT PREVENTED

## 2023-08-16 ASSESSMENT — PAIN SCALES - GENERAL
PAINLEVEL_OUTOF10: 5
PAINLEVEL_OUTOF10: 3
PAINLEVEL_OUTOF10: 6
PAINLEVEL_OUTOF10: 2
PAINLEVEL_OUTOF10: 8
PAINLEVEL_OUTOF10: 8
PAINLEVEL_OUTOF10: 7
PAINLEVEL_OUTOF10: 2
PAINLEVEL_OUTOF10: 7
PAINLEVEL_OUTOF10: 2
PAINLEVEL_OUTOF10: 2

## 2023-08-16 ASSESSMENT — PAIN DESCRIPTION - LOCATION
LOCATION: LEG
LOCATION: LEG;BACK
LOCATION: LEG;BACK
LOCATION: LEG

## 2023-08-16 ASSESSMENT — PAIN DESCRIPTION - ORIENTATION
ORIENTATION: LEFT
ORIENTATION: LEFT;LOWER

## 2023-08-16 ASSESSMENT — PAIN DESCRIPTION - ONSET
ONSET: ON-GOING
ONSET: ON-GOING

## 2023-08-16 NOTE — PROGRESS NOTES
Physical Therapy  Physical Therapy Re-Assessment     Name: Dima Gardner  : 1955  MRN: [id number]      Date of Service: 2023    Evaluating PT:  Shellee Aschoff, PT, DPT [id number]    Room #:  2848/0356-F  Diagnosis:  Acute pulmonary embolism without acute cor pulmonale, unspecified pulmonary embolism type ([address]) [I26.99]  PMHx/PSHx:    Past Medical History:   Diagnosis Date    H/O cardiovascular stress test 2021    Lexiscan    Hepatitis C     Hypertension     PE (pulmonary embolism) 07/01/2012    x2 in the past    PONV (postoperative nausea and vomiting)     Thyroid disease      Past Surgical History:   Procedure Laterality Date    APPENDECTOMY      CT ASP ABS HEMATOMA BULLA CYST  8/3/2023    CT ASP ABS HEMATOMA BULLA CYST 8/3/2023 Leatha Purdy MD [facility]    HYSTERECTOMY (CERVIX STATUS UNKNOWN)      LAMINECTOMY N/A 2023    Left L4-L5 vidya-laminectomy performed by Kristen Baker MD at [address] Right     rotator cuff repair    TOTAL KNEE ARTHROPLASTY Left     R TKA 2005 [employer]       Procedure/Surgery:   EKOS insertion; 8/15 EKOS removal  Precautions:  Falls, Spinal  Equipment Needs:  TBD    SUBJECTIVE:    Pt lives with son in a 1 story home with ramped entry. Pt ambulated without device and was independent PTA. OBJECTIVE:   Re-Evaluation  Date: 23 Treatment Short Term/ Long Term   Goals   AM-PAC 6 Clicks 84/13     Was pt agreeable to Eval/treatment? Yes     Does pt have pain?  7/10 L groin and L calf pain     Bed Mobility  Rolling: ModA  Supine to sit: ModA  Sit to supine: NT  Scooting: SBA  Mod Independent   Transfers Sit to stand: Jessica  Stand to sit: Jessica  Stand pivot: Jessica with HHA  Mod Independent with AAD   Ambulation   20 feet with Jessica with HHA  >400 feet with Mod Independent with AAD   Stair negotiation: ascended and descended NT  >4 steps with 1 rail Mod Independent   ROM BUE:  Defer to OT note  BLE:  WFL     Strength BUE:  Defer to OT note  RLE:  4+/5  LLE:   4/5  Increase by 1/3 MMT grade   Balance Sitting EOB:  SBA  Dynamic Standing:  Jessica with HHA  Sitting EOB:  Independent  Dynamic Standing: Mod Independent with AAD     Pt is A & O x 4  CAM-ICU: NT  RASS: 0  Sensation:  no reported paresthesia  Edema:  LLE    Vitals:  Heart Rate at rest 78 bpm Heart Rate post session 111 bpm   SpO2 at rest 96% SpO2 post session 95%   Blood Pressure at rest 110/61 mmHg Blood Pressure post session 90/66 mmHg   BP in chair after transfer: 111/65    Functional Status Score-Intensive Care Unit (FSS-ICU)   Rolling 3/7   Supine to sit transfer 3/7   Unsupported sitting  5/7   Sit to stand transfers 4/7   Ambulation 1/7   Total  16/35       Therapeutic Exercises:  sit to stand x 2 reps    Patient education  Pt educated on safety    Patient response to education:   Pt verbalized understanding Pt demonstrated skill Pt requires further education in this area   x x x     ASSESSMENT:    Conditions Requiring Skilled Therapeutic Intervention:    [x]Decreased strength     []Decreased ROM  [x]Decreased functional mobility  [x]Decreased balance   [x]Decreased endurance   []Decreased posture  []Decreased sensation  []Decreased coordination   []Decreased vision  []Decreased safety awareness   [x]Increased pain       Comments:  NP reported pt was medically stable. Pt was in bed upon arrival, agreeable to re-evaluation s/p decline in medical status and transfer to ICU. Reviewed spinal precautions prior to activity. Trunk assistance provided for bed mobility. Pt stood with unsteadiness and completed shuffled steps to chair. Pt then ambulated in room with decreased speed. Fatigue limited activity. Pt was left in chair with all needs met and call light in reach. LLE elevated and all lines remained intact. Educated pt on safety and need for assistance when getting out of chair; pt understood and agreed to use call light. RN aware.     Treatment:  Patient practiced and was

## 2023-08-16 NOTE — PLAN OF CARE
Problem: Discharge Planning  Goal: Discharge to home or other facility with appropriate resources  4/86/8518 7106 by Faiza Barcenas RN  Outcome: Progressing  8/16/2023 0048 by No Hoffman RN  Outcome: Progressing     Problem: Pain  Goal: Verbalizes/displays adequate comfort level or baseline comfort level  6/28/6644 7952 by Faiza Barcenas RN  Outcome: Progressing  8/16/2023 0048 by No Hoffman RN  Outcome: Progressing     Problem: Safety - Adult  Goal: Free from fall injury  9/73/2610 4418 by Faiza Barcenas RN  Outcome: Progressing  8/16/2023 0048 by No Hoffman RN  Outcome: Progressing

## 2023-08-16 NOTE — PLAN OF CARE
Problem: Discharge Planning  Goal: Discharge to home or other facility with appropriate resources  Outcome: Progressing     Problem: Pain  Goal: Verbalizes/displays adequate comfort level or baseline comfort level  Outcome: Progressing     Problem: Safety - Adult  Goal: Free from fall injury  Outcome: Progressing     Problem: ABCDS Injury Assessment  Goal: Absence of physical injury  Outcome: Progressing     Problem: Skin/Tissue Integrity  Goal: Absence of new skin breakdown  Description: 1. Monitor for areas of redness and/or skin breakdown  2. Assess vascular access sites hourly  3. Every 4-6 hours minimum:  Change oxygen saturation probe site  4. Every 4-6 hours:  If on nasal continuous positive airway pressure, respiratory therapy assess nares and determine need for appliance change or resting period.   Outcome: Progressing     Problem: Nutrition Deficit:  Goal: Optimize nutritional status  Outcome: Progressing     Problem: Neurosensory - Adult  Goal: Achieves maximal functionality and self care  Outcome: Progressing     Problem: Skin/Tissue Integrity - Adult  Goal: Skin integrity remains intact  Outcome: Progressing  Goal: Incisions, wounds, or drain sites healing without S/S of infection  Outcome: Progressing  Goal: Oral mucous membranes remain intact  Outcome: Progressing     Problem: Musculoskeletal - Adult  Goal: Return mobility to safest level of function  Outcome: Progressing  Goal: Maintain proper alignment of affected body part  Outcome: Progressing  Goal: Return ADL status to a safe level of function  Outcome: Progressing     Problem: Genitourinary - Adult  Goal: Absence of urinary retention  Outcome: Progressing  Goal: Urinary catheter remains patent  Outcome: Progressing     Problem: Infection - Adult  Goal: Absence of infection at discharge  Outcome: Progressing  Goal: Absence of infection during hospitalization  Outcome: Progressing  Goal: Absence of fever/infection during anticipated neutropenic period  Outcome: Progressing     Problem: Hematologic - Adult  Goal: Maintains hematologic stability  Outcome: Progressing     Problem: Chronic Conditions and Co-morbidities  Goal: Patient's chronic conditions and co-morbidity symptoms are monitored and maintained or improved  Outcome: Progressing

## 2023-08-16 NOTE — PROGRESS NOTES
[facility] [address]      Date:2023                                                               Patient Name: Thelma Tobin  MRN: [id number]  : 1955  Room: [address] [place] [address]    RE-Evaluating OT: Marngozi Segun, OTR/L [id number]       Evaluating OT: Brina De León, [address], OTR/L [id number]  Referring Sharla Guzman MD  Specific Provider Orders: OT eval and treat      Diagnosis: PE   Surgery:   for LLE DVT  Pertinent Medical History: L4-L5 facetectomy and foraminotomy , HTN, thyroid disease, Hep C    Precautions:  Fall Risk, spinal     Reason for Re-evaluation: s/p  EKOS insertion; 8/15 EKOS removal      Assessment of current deficits   [x] Functional mobility  [x]ADLs  [x] Strength               []Cognition   [x] Functional transfers   [x] IADLs         [x] Safety Awareness   [x]Endurance   [] Fine Coordination        [] ROM     [] Vision/perception   []Sensation    []Gross Motor Coordination [x] Balance   [] Delirium                  [] Communication    OT PLAN OF CARE   OT POC based on physician orders, patient diagnosis and results of clinical assessment.        Frequency/Duration: 1-3 days/wk for 1-2 weeks PRN    Specific OT Treatment to include:   ADL retraining/adapted techniques and AE recommendations to increase functional independence within precautions                    Energy conservation techniques to improve tolerance for selfcare routine   Functional transfer/mobility training/DME recommendations for increased independence, safety and fall prevention         Patient/family education to increase safety and functional independence within precautions              Environmental modifications for safe mobility and completion of ADLs                             Therapeutic activity to improve functional performance during ADLs/IADLs Transfers Sit to stand:   Min A    Stand to sit:   Min A                        Brian  sit<>stand/functional bathroom transfers using AD/DME as needed for balance and safety   Functional Mobility Min A no device; short distance                       Brian   functional/bathroom mobility using AD as needed & demonstrating G safety     Balance Sitting:     Static:  SBA    Dynamic:Min A  Standing: Min A  Brian dynamic sitting balance; Brian dynamic standing balance  during ADL tasks & transfers   Endurance/Activity Tolerance fair tolerance with light activity and short breaks. BP in chair s/p transfer: 111/65  G   tolerance with moderate activity/self care routine   Visual/  Perceptual   WFL                     Vitals:  Heart Rate at rest 78 bpm Heart Rate post session 111 bpm   SpO2 at rest 96% SpO2 post session 95%   Blood Pressure at rest 110/61 mmHg Blood Pressure post session 90/66 mmHg     Treatment: OT treatment provided this date includes:  Bed mobility: Instruction on back safety precautions prior to bed mobility to facilitate safe transfers and ADLS. Pt required mod assist for log roll. No c/o dizziness EOB. Balance retraining: Performed sitting/standing balance ex's with instruction to facilitate righting reactions with postural changes during ADLS. Pt may benefit from use of AD pending progress. ADL retraining: Instruction on adapted techniques/AE(reacher, sock aid) to increase independence and safe reach during dressing/bathing activities. Pt demonstrated fair understanding; can benefit from further training. Energy conservation: Education on breathing techniques, pacing, work simplification strategies & recommended bathroom DME for safety and energy conservation during self care tasks and activities of daily living. Line management and environmental modifications made prior to and end of session to ensure patient safety and to increase efficiency of session.   Skilled monitoring of HR, O2

## 2023-08-17 ENCOUNTER — APPOINTMENT (OUTPATIENT)
Dept: GENERAL RADIOLOGY | Age: 68
End: 2023-08-17
Payer: MEDICARE

## 2023-08-17 VITALS
DIASTOLIC BLOOD PRESSURE: 58 MMHG | OXYGEN SATURATION: 93 % | BODY MASS INDEX: 38.04 KG/M2 | WEIGHT: 242.4 LBS | SYSTOLIC BLOOD PRESSURE: 126 MMHG | TEMPERATURE: 98.3 F | HEIGHT: 67 IN | RESPIRATION RATE: 17 BRPM | HEART RATE: 77 BPM

## 2023-08-17 LAB
ALBUMIN SERPL-MCNC: 3 G/DL (ref 3.5–5.2)
ALP SERPL-CCNC: 147 U/L (ref 35–104)
ALT SERPL-CCNC: 35 U/L (ref 0–32)
ANION GAP SERPL CALCULATED.3IONS-SCNC: 8 MMOL/L (ref 7–16)
AST SERPL-CCNC: 43 U/L (ref 0–31)
BASOPHILS # BLD: 0.11 K/UL (ref 0–0.2)
BASOPHILS NFR BLD: 1 % (ref 0–2)
BILIRUB SERPL-MCNC: 0.2 MG/DL (ref 0–1.2)
BUN SERPL-MCNC: 18 MG/DL (ref 6–23)
CALCIUM SERPL-MCNC: 8.8 MG/DL (ref 8.6–10.2)
CHLORIDE SERPL-SCNC: 104 MMOL/L (ref 98–107)
CO2 SERPL-SCNC: 25 MMOL/L (ref 22–29)
CREAT SERPL-MCNC: 1.6 MG/DL (ref 0.5–1)
EOSINOPHIL # BLD: 0.22 K/UL (ref 0.05–0.5)
EOSINOPHILS RELATIVE PERCENT: 2 % (ref 0–6)
ERYTHROCYTE [DISTWIDTH] IN BLOOD BY AUTOMATED COUNT: 13.4 % (ref 11.5–15)
GFR SERPL CREATININE-BSD FRML MDRD: 35 ML/MIN/1.73M2
GLUCOSE SERPL-MCNC: 88 MG/DL (ref 74–99)
HCT VFR BLD AUTO: 29.3 % (ref 34–48)
HGB BLD-MCNC: 8.8 G/DL (ref 11.5–15.5)
LYMPHOCYTES NFR BLD: 1.63 K/UL (ref 1.5–4)
LYMPHOCYTES RELATIVE PERCENT: 13 % (ref 20–42)
MCH RBC QN AUTO: 29.9 PG (ref 26–35)
MCHC RBC AUTO-ENTMCNC: 30 G/DL (ref 32–34.5)
MCV RBC AUTO: 99.7 FL (ref 80–99.9)
METAMYELOCYTES ABSOLUTE COUNT: 0.11 K/UL (ref 0–0.12)
METAMYELOCYTES: 1 % (ref 0–1)
MONOCYTES NFR BLD: 0.65 K/UL (ref 0.1–0.95)
MONOCYTES NFR BLD: 5 % (ref 2–12)
MYELOCYTES ABSOLUTE COUNT: 0.11 K/UL
MYELOCYTES: 1 %
NEUTROPHILS NFR BLD: 77 % (ref 43–80)
NEUTS SEG NFR BLD: 9.57 K/UL (ref 1.8–7.3)
PLATELET # BLD AUTO: 317 K/UL (ref 130–450)
PMV BLD AUTO: 10.4 FL (ref 7–12)
POTASSIUM SERPL-SCNC: 4.2 MMOL/L (ref 3.5–5)
PROCALCITONIN SERPL-MCNC: 0.23 NG/ML (ref 0–0.08)
PROT SERPL-MCNC: 6.1 G/DL (ref 6.4–8.3)
RBC # BLD AUTO: 2.94 M/UL (ref 3.5–5.5)
RBC # BLD: ABNORMAL 10*6/UL
SODIUM SERPL-SCNC: 137 MMOL/L (ref 132–146)
WBC OTHER # BLD: 12.4 K/UL (ref 4.5–11.5)

## 2023-08-17 PROCEDURE — 6370000000 HC RX 637 (ALT 250 FOR IP): Performed by: NURSE PRACTITIONER

## 2023-08-17 PROCEDURE — 71045 X-RAY EXAM CHEST 1 VIEW: CPT

## 2023-08-17 PROCEDURE — 85025 COMPLETE CBC W/AUTO DIFF WBC: CPT

## 2023-08-17 PROCEDURE — 80053 COMPREHEN METABOLIC PANEL: CPT

## 2023-08-17 PROCEDURE — 2500000003 HC RX 250 WO HCPCS

## 2023-08-17 PROCEDURE — 2700000000 HC OXYGEN THERAPY PER DAY

## 2023-08-17 PROCEDURE — 2580000003 HC RX 258: Performed by: SURGERY

## 2023-08-17 PROCEDURE — 84145 PROCALCITONIN (PCT): CPT

## 2023-08-17 PROCEDURE — 6370000000 HC RX 637 (ALT 250 FOR IP): Performed by: SURGERY

## 2023-08-17 PROCEDURE — 36415 COLL VENOUS BLD VENIPUNCTURE: CPT

## 2023-08-17 RX ORDER — GUAIFENESIN 600 MG/1
600 TABLET, EXTENDED RELEASE ORAL 2 TIMES DAILY
Status: DISCONTINUED | OUTPATIENT
Start: 2023-08-17 | End: 2023-08-17 | Stop reason: CLARIF

## 2023-08-17 RX ORDER — OXYCODONE HYDROCHLORIDE AND ACETAMINOPHEN 5; 325 MG/1; MG/1
1 TABLET ORAL EVERY 4 HOURS PRN
Qty: 10 TABLET | Refills: 0 | Status: SHIPPED | OUTPATIENT
Start: 2023-08-17 | End: 2023-08-20

## 2023-08-17 RX ORDER — GUAIFENESIN 400 MG/1
400 TABLET ORAL 3 TIMES DAILY
Status: DISCONTINUED | OUTPATIENT
Start: 2023-08-17 | End: 2023-08-17 | Stop reason: HOSPADM

## 2023-08-17 RX ORDER — CYCLOBENZAPRINE HCL 10 MG
10 TABLET ORAL 3 TIMES DAILY PRN
Qty: 10 TABLET | Refills: 0 | Status: SHIPPED | OUTPATIENT
Start: 2023-08-17 | End: 2023-08-27

## 2023-08-17 RX ADMIN — RIVAROXABAN 15 MG: 15 TABLET, FILM COATED ORAL at 18:43

## 2023-08-17 RX ADMIN — HYDROMORPHONE HYDROCHLORIDE 0.5 MG: 1 INJECTION, SOLUTION INTRAMUSCULAR; INTRAVENOUS; SUBCUTANEOUS at 08:50

## 2023-08-17 RX ADMIN — DOCUSATE SODIUM 100 MG: 100 CAPSULE, LIQUID FILLED ORAL at 08:39

## 2023-08-17 RX ADMIN — LEVOTHYROXINE SODIUM 125 MCG: 0.12 TABLET ORAL at 05:46

## 2023-08-17 RX ADMIN — PRAVASTATIN SODIUM 40 MG: 20 TABLET ORAL at 08:39

## 2023-08-17 RX ADMIN — SODIUM CHLORIDE, PRESERVATIVE FREE 10 ML: 5 INJECTION INTRAVENOUS at 03:30

## 2023-08-17 RX ADMIN — SODIUM CHLORIDE, PRESERVATIVE FREE 10 ML: 5 INJECTION INTRAVENOUS at 08:41

## 2023-08-17 RX ADMIN — HYDROMORPHONE HYDROCHLORIDE 0.5 MG: 1 INJECTION, SOLUTION INTRAMUSCULAR; INTRAVENOUS; SUBCUTANEOUS at 13:51

## 2023-08-17 RX ADMIN — Medication 2000 UNITS: at 08:39

## 2023-08-17 RX ADMIN — RIVAROXABAN 15 MG: 15 TABLET, FILM COATED ORAL at 09:34

## 2023-08-17 RX ADMIN — HYDROMORPHONE HYDROCHLORIDE 1 MG: 1 INJECTION, SOLUTION INTRAMUSCULAR; INTRAVENOUS; SUBCUTANEOUS at 03:30

## 2023-08-17 RX ADMIN — FOLIC ACID 1 MG: 1 TABLET ORAL at 08:39

## 2023-08-17 RX ADMIN — CYANOCOBALAMIN TAB 1000 MCG 1000 MCG: 1000 TAB at 08:40

## 2023-08-17 ASSESSMENT — PAIN SCALES - GENERAL
PAINLEVEL_OUTOF10: 0
PAINLEVEL_OUTOF10: 3
PAINLEVEL_OUTOF10: 7
PAINLEVEL_OUTOF10: 8
PAINLEVEL_OUTOF10: 7
PAINLEVEL_OUTOF10: 0
PAINLEVEL_OUTOF10: 8
PAINLEVEL_OUTOF10: 2

## 2023-08-17 ASSESSMENT — PAIN DESCRIPTION - DESCRIPTORS: DESCRIPTORS: ACHING;DISCOMFORT;THROBBING

## 2023-08-17 ASSESSMENT — PAIN DESCRIPTION - ORIENTATION
ORIENTATION: LEFT
ORIENTATION: LEFT;LOWER

## 2023-08-17 ASSESSMENT — PAIN DESCRIPTION - LOCATION
LOCATION: FOOT;LEG
LOCATION: LEG
LOCATION: LEG

## 2023-08-17 ASSESSMENT — PAIN SCALES - WONG BAKER
WONGBAKER_NUMERICALRESPONSE: 2
WONGBAKER_NUMERICALRESPONSE: 2

## 2023-08-17 NOTE — CARE COORDINATION
Patient on PICU with l dvt and PE. On Xarelto. PT ampac 14/24 and OTampac 15/24. Patient is 97% on RA. Met with patient to have further discussion of transition of care. Patient declines [facility] and says she lives with her son in a single story home and when her son is not there she has friends and a daughter that can assist her. Patient plans to return home with [facility] and will need orders to resume SN/PT/OT. Family will transport home when medically cleared. Please fax discharge instructions and [facility] orders to  [facility] at [phone] at discharge . CM/SW will continue to follow Patient requested that I call her daughter, CAROLINA regarding transition of care. Daughter requesting [facility] acute rehab referral but they have no beds this week. She is willing to have  referral to [facility]. Message left with Suzanna Dela Cruz from [facility] to return call. Suzanna Dela Cruz from [facility] has accepted. Informed daughter and she requested that I speak with her mothet. At this time patient does not want [facility] or [facility] she wants to go home. She wants to wait for test results and think about [facility]. Once discharge plan in place will need ambulette for,if plan is [facility], or fax [facility] orders to [facility] if plan is discharge to home.  CM/SW will continue to follow

## 2023-08-17 NOTE — DISCHARGE SUMMARY
Hospitalist Discharge Summary    Patient ID: Carey Hutchins   Patient : 1955  Patient's PCP: MELANIE Lopes NP    Admit Date: 8/10/2023   Admitting Physician: Liliya Nash MD    Discharge Date:  2023   Discharge Physician: Liliya Nash MD   Discharge Condition: Stable  Discharge Disposition: [facility] course in brief:  (Please refer to daily progress notes for a comprehensive review of the hospitalization by requesting medical records)    60-year-old lady past medical history of factor V Leyden on Xarelto was off anticoagulation given recent L4-L5 hemilaminectomy, L4-L5 medial facetectomy, and L4 and L5 foraminotomyon 2023, history of DVT status post IVC filter, history of PE, hypertension with CKD, chronic anemia. She presents to the ER today due to shortness of breath and left leg swelling. Found to have extensive left lower extremity DVT involving common femoral.  Also small pulmonary embolism in the left lower lobe. CT venogram still showed extensive clot with IVC filter placement. Patient underwent intravenous thrombolysis via EKOS catheter. Follow up angiography showed improvement in clot burden Hematology and vascular surgery is on board. Patient still complains of pain and swelling in the left lower extremity. She is on prn pain medication. Patient has been cleared by vascular surgery for discharge. Patient had cough and her daughter was concerned about pneumonia; CXR done-no acute findings; ordered mucinex. talked to patient's daughter about her concerns;    Patient is stable to discharge    Consults:   IP CONSULT TO INTERNAL MEDICINE  IP CONSULT TO INFECTIOUS DISEASES  IP CONSULT TO HEM/ONC  IP CONSULT TO VASCULAR SURGERY    Discharge Diagnoses:    Acute pulmonary embolism  Acute extensive left leg DVT  IVC thrombosis  Status post IVC filter  Leukocytosis  Factor V Leiden on Xarelto with hx of PE and IVC filter  Hypertension  CKD  S/p L4-L5 reconstruction, and/or weight based adjustment of the mA/kV was utilized to reduce the radiation dose to as low as reasonably achievable. COMPARISON: None. HISTORY: ORDERING SYSTEM PROVIDED HISTORY: rule out dissection TECHNOLOGIST PROVIDED HISTORY: Reason for exam:->rule out dissection Decision Support Exception - unselect if not a suspected or confirmed emergency medical condition->Emergency Medical Condition (MA) What reading provider will be dictating this exam?->CRC FINDINGS: CTA CHEST: Thoracic aorta: No evidence of thoracic aortic aneurysm or dissection. No acute abnormality of the aorta. There is mild atherosclerotic disease seen within the thoracic aorta. Some vascular calcifications seen in the origin the great vessels. The coronary arteries are with minimal atherosclerotic disease. Mediastinum: No evidence of mediastinal lymphadenopathy. Cardiac chambers are within normal limits. No pericardial effusion. No bulky hilar or axillary lymphadenopathy. The heart and pericardium demonstrate no acute abnormality. Lungs/Pleura: The lungs are without acute process. Dependent atelectatic changes identified lung bases bilaterally. No parenchymal consolidation, pulmonary mass or nodular density. No pleural effusion or pneumothorax. No focal consolidation or pulmonary edema. No evidence of pleural effusion or pneumothorax. Soft Tissues/Bones: No acute bone or soft tissue abnormality. Minimal multilevel degenerative changes identified diffusely throughout the spine. CTA ABDOMEN: Abdominal aorta/Branches: The abdominal aorta is normal in caliber. Minimal atherosclerotic disease seen within the abdominal aorta and iliac vessels. No dissection or intimal flap. IVC filters in satisfactory position. Organs: The liver is homogeneous in appearance. The gallbladder is been surgically removed. No intrahepatic or extrahepatic biliary ductal dilatation. Pancreas is homogeneous. Spleen is unremarkable.   Both minutes in the examination, evaluation, counseling and review of medications and discharge plan.    +++++++++++++++++++++++++++++++++++++++++++++++++  Luciano Dent MD  [place], [place]  +++++++++++++++++++++++++++++++++++++++++++++++++  NOTE: This report was transcribed using voice recognition software. Every effort was made to ensure accuracy; however, inadvertent computerized transcription errors may be present.

## 2023-08-17 NOTE — CARE COORDINATION
Faxed [facility] orders and notified them of discharge today. For questions I can be reached at [phone].  Jeremy [place], [place]

## 2023-08-17 NOTE — PLAN OF CARE
Problem: Discharge Planning  Goal: Discharge to home or other facility with appropriate resources  Outcome: Completed     Problem: Pain  Goal: Verbalizes/displays adequate comfort level or baseline comfort level  Outcome: Completed     Problem: Safety - Adult  Goal: Free from fall injury  Outcome: Completed     Problem: ABCDS Injury Assessment  Goal: Absence of physical injury  Outcome: Completed     Problem: Skin/Tissue Integrity  Goal: Absence of new skin breakdown  Description: 1. Monitor for areas of redness and/or skin breakdown  2. Assess vascular access sites hourly  3. Every 4-6 hours minimum:  Change oxygen saturation probe site  4. Every 4-6 hours:  If on nasal continuous positive airway pressure, respiratory therapy assess nares and determine need for appliance change or resting period.   Outcome: Completed     Problem: Nutrition Deficit:  Goal: Optimize nutritional status  Outcome: Completed     Problem: Neurosensory - Adult  Goal: Achieves maximal functionality and self care  Outcome: Completed     Problem: Skin/Tissue Integrity - Adult  Goal: Skin integrity remains intact  Outcome: Completed  Goal: Incisions, wounds, or drain sites healing without S/S of infection  Outcome: Adequate for Discharge  Goal: Oral mucous membranes remain intact  Outcome: Completed     Problem: Musculoskeletal - Adult  Goal: Return mobility to safest level of function  Outcome: Completed  Goal: Maintain proper alignment of affected body part  Outcome: Completed  Goal: Return ADL status to a safe level of function  Outcome: Completed     Problem: Genitourinary - Adult  Goal: Absence of urinary retention  Outcome: Completed  Goal: Urinary catheter remains patent  Outcome: Completed     Problem: Infection - Adult  Goal: Absence of infection at discharge  Outcome: Completed  Goal: Absence of infection during hospitalization  Outcome: Completed  Goal: Absence of fever/infection during anticipated neutropenic period  Outcome: Completed     Problem: Hematologic - Adult  Goal: Maintains hematologic stability  Outcome: Completed     Problem: Chronic Conditions and Co-morbidities  Goal: Patient's chronic conditions and co-morbidity symptoms are monitored and maintained or improved  Outcome: Completed

## 2023-08-17 NOTE — PROGRESS NOTES
Hospitalist Discharge Summary    Patient ID: Hardy Alvarez   Patient : 1955  Patient's PCP: MELANIE Wright NP    Admit Date: 8/10/2023   Admitting Physician: Carlos Alberto Diop MD    Discharge Date:  2023   Discharge Physician: Carlos Alberto Diop MD   Discharge Condition: Stable  Discharge Disposition: [facility] course in brief:  (Please refer to daily progress notes for a comprehensive review of the hospitalization by requesting medical records)    52-year-old lady past medical history of factor V Leyden on Xarelto was off anticoagulation given recent L4-L5 hemilaminectomy, L4-L5 medial facetectomy, and L4 and L5 foraminotomyon 2023, history of DVT status post IVC filter, history of PE, hypertension with CKD, chronic anemia. She presents to the ER today due to shortness of breath and left leg swelling. Found to have extensive left lower extremity DVT involving common femoral.  Also small pulmonary embolism in the left lower lobe. CT venogram still showed extensive clot with IVC filter placement. Patient underwent intravenous thrombolysis via EKOS catheter. Follow up angiography showed improvement in clot burden Hematology and vascular surgery is on board. Patient still complains of pain and swelling in the left lower extremity. She is on prn pain medication. Patient has been cleared by vascular surgery for discharge. Patient had cough and her daughter was concerned about pneumonia; CXR done-no acute findings; ordered mucinex. talked to patient's daughter about her concerns;    Patient is stable to discharge    Consults:   IP CONSULT TO INTERNAL MEDICINE  IP CONSULT TO INFECTIOUS DISEASES  IP CONSULT TO HEM/ONC  IP CONSULT TO VASCULAR SURGERY    Discharge Diagnoses:    Acute pulmonary embolism  Acute extensive left leg DVT  IVC thrombosis  Status post IVC filter  Leukocytosis  Factor V Leiden on Xarelto with hx of PE and IVC filter  Hypertension  CKD  S/p L4-L5 laminectomy What reading provider will be dictating this exam?->CRC Intraprocedural imaging. FINDINGS: 3 intraoperative spot images of the lumbar spine were obtained. .     Intraprocedural fluoroscopic spot images as above. See separate procedure report for more information. CTA VENOUS ABDOMEN PELVIS W WO CONTRAST    Addendum Date: 8/13/2023    ADDENDUM: Results communicated to [address] [place] [address] at 6:19 p.m. 8/13/2023     Result Date: 8/13/2023  EXAMINATION: CT VENOGRAM OF THE ABDOMEN AND PELVIS 8/13/2023 4:12 pm TECHNIQUE: CT venogram of the abdomen/pelvis was performed with the administration of intravenous contrast. Multiplanar reformatted images are provided for review. MIP images are provided for review. Automated exposure control, iterative reconstruction, and/or weight based adjustment of the mA/kV was utilized to reduce the radiation dose to as low as reasonably achievable. COMPARISON: 08/02/2023 HISTORY: ORDERING SYSTEM PROVIDED HISTORY: Evaluate for iliofemoral DVT TECHNOLOGIST PROVIDED HISTORY: Reason for exam:->Evaluate for iliofemoral DVT What reading provider will be dictating this exam?->CRC FINDINGS: Small pericardial effusion. Small hiatal hernia. Small left lower lobe pulmonary emboli. Similar appearing fluid collections in the right anterolateral body wall and posterior midline body wall as described previously. Few scattered small hypodensities in the liver too small to further characterize but statistically benign in the absence of a known malignancy. Cholecystectomy. Solid organs otherwise unremarkable. Atherosclerotic nonaneurysmal aorta. No acute major arterial findings. IVC filter in place. Thrombus present in the IVC near the level of the IVC filter extending slightly superior to it and extending inferiorly into the left greater than right iliac venous structures and left femoral vein. Thrombus appears occlusive/near occlusive in the left common iliac vein.  Additional suspected nonocclusive venous thrombus extending into the right femoral vein as well. Nonobstructive bowel gas pattern. Hysterectomy. Trace ascites. No acute osseous findings or destructive bone lesions. No acute osseous findings or destructive bone lesions. Lower lumbar decompression surgery. 1. Positive for DVT as described. Thrombus extends from just above the level of the IVC filter into the left greater than right iliofemoral venous systems. Discharge Medications:      Medication List        START taking these medications      cyclobenzaprine 10 MG tablet  Commonly known as: FLEXERIL  Take 1 tablet by mouth 3 times daily as needed for Muscle spasms     oxyCODONE-acetaminophen 5-325 MG per tablet  Commonly known as: PERCOCET  Take 1 tablet by mouth every 4 hours as needed for Pain for up to 3 days.  Max Daily Amount: 6 tablets            CONTINUE taking these medications      acetaminophen 325 MG tablet  Commonly known as: TYLENOL     amLODIPine 5 MG tablet  Commonly known as: NORVASC     cyanocobalamin 1000 MCG tablet  Take 1 tablet by mouth daily     folic acid 1 MG tablet  Commonly known as: FOLVITE  Take 1 tablet by mouth daily     levothyroxine 125 MCG tablet  Commonly known as: SYNTHROID     ondansetron 4 MG tablet  Commonly known as: Zofran  Take 1 tablet by mouth every 8 hours as needed for Nausea or Vomiting     pravastatin 40 MG tablet  Commonly known as: PRAVACHOL     vitamin D 50 MCG (2000 UT) Tabs tablet  Commonly known as: CHOLECALCIFEROL  Take 1 tablet by mouth daily     Xarelto 10 MG Tabs tablet  Generic drug: rivaroxaban               Where to Get Your Medications        These medications were sent to [address] [place] [address] Asha Rowley [address]      Phone: [phone]   cyclobenzaprine 10 MG tablet  oxyCODONE-acetaminophen 5-325 MG per tablet         Time Spent on discharge is more than 45

## 2023-08-17 NOTE — PLAN OF CARE
Problem: Discharge Planning  Goal: Discharge to home or other facility with appropriate resources  Outcome: Completed     Problem: Pain  Goal: Verbalizes/displays adequate comfort level or baseline comfort level  Outcome: Completed     Problem: Safety - Adult  Goal: Free from fall injury  Outcome: Completed     Problem: ABCDS Injury Assessment  Goal: Absence of physical injury  Outcome: Completed     Problem: Skin/Tissue Integrity  Goal: Absence of new skin breakdown  Description: 1. Monitor for areas of redness and/or skin breakdown  2. Assess vascular access sites hourly  3. Every 4-6 hours minimum:  Change oxygen saturation probe site  4. Every 4-6 hours:  If on nasal continuous positive airway pressure, respiratory therapy assess nares and determine need for appliance change or resting period.   Outcome: Completed     Problem: Nutrition Deficit:  Goal: Optimize nutritional status  Outcome: Completed     Problem: Neurosensory - Adult  Goal: Achieves maximal functionality and self care  Outcome: Completed     Problem: Skin/Tissue Integrity - Adult  Goal: Skin integrity remains intact  Outcome: Completed  Goal: Oral mucous membranes remain intact  Outcome: Completed     Problem: Musculoskeletal - Adult  Goal: Return mobility to safest level of function  Outcome: Completed  Goal: Maintain proper alignment of affected body part  Outcome: Completed  Goal: Return ADL status to a safe level of function  Outcome: Completed     Problem: Genitourinary - Adult  Goal: Absence of urinary retention  Outcome: Completed  Goal: Urinary catheter remains patent  Outcome: Completed     Problem: Infection - Adult  Goal: Absence of infection at discharge  Outcome: Completed  Goal: Absence of infection during hospitalization  Outcome: Completed  Goal: Absence of fever/infection during anticipated neutropenic period  Outcome: Completed     Problem: Hematologic - Adult  Goal: Maintains hematologic stability  Outcome: Completed

## 2023-08-17 NOTE — PLAN OF CARE
Problem: Discharge Planning  Goal: Discharge to home or other facility with appropriate resources  8/16/2023 2244 by Josef Ambriz RN  Outcome: Progressing  2/06/2697 0935 by Pepe Zhao RN  Outcome: Progressing     Problem: Pain  Goal: Verbalizes/displays adequate comfort level or baseline comfort level  8/16/2023 2244 by Josef Ambriz RN  Outcome: Progressing  5/93/0645 [id number] by Pepe Zhao RN  Outcome: Progressing     Problem: Safety - Adult  Goal: Free from fall injury  8/16/2023 2244 by Josef Ambriz RN  Outcome: Progressing  9/55/4490 1790 by Pepe Zhao RN  Outcome: Progressing     Problem: ABCDS Injury Assessment  Goal: Absence of physical injury  Outcome: Progressing     Problem: Skin/Tissue Integrity  Goal: Absence of new skin breakdown  Description: 1. Monitor for areas of redness and/or skin breakdown  2. Assess vascular access sites hourly  3. Every 4-6 hours minimum:  Change oxygen saturation probe site  4. Every 4-6 hours:  If on nasal continuous positive airway pressure, respiratory therapy assess nares and determine need for appliance change or resting period.   Outcome: Progressing     Problem: Nutrition Deficit:  Goal: Optimize nutritional status  Outcome: Progressing     Problem: Neurosensory - Adult  Goal: Achieves maximal functionality and self care  Outcome: Progressing     Problem: Skin/Tissue Integrity - Adult  Goal: Skin integrity remains intact  Outcome: Progressing  Goal: Incisions, wounds, or drain sites healing without S/S of infection  Outcome: Progressing  Goal: Oral mucous membranes remain intact  Outcome: Progressing     Problem: Musculoskeletal - Adult  Goal: Return mobility to safest level of function  Outcome: Progressing  Goal: Maintain proper alignment of affected body part  Outcome: Progressing  Goal: Return ADL status to a safe level of function  Outcome: Progressing     Problem: Genitourinary - Adult  Goal: Absence of urinary retention  Outcome:

## 2023-09-06 LAB
BASOPHILS # BLD: 0.19 K/UL (ref 0–0.2)
BASOPHILS NFR BLD: 3 % (ref 0–2)
EOSINOPHIL # BLD: 0.38 K/UL (ref 0.05–0.5)
EOSINOPHILS RELATIVE PERCENT: 5 % (ref 0–6)
ERYTHROCYTE [DISTWIDTH] IN BLOOD BY AUTOMATED COUNT: 14.4 % (ref 11.5–15)
HCT VFR BLD AUTO: 34.4 % (ref 34–48)
HGB BLD-MCNC: 9.8 G/DL (ref 11.5–15.5)
LYMPHOCYTES NFR BLD: 2.5 K/UL (ref 1.5–4)
LYMPHOCYTES RELATIVE PERCENT: 35 % (ref 20–42)
MCH RBC QN AUTO: 29.3 PG (ref 26–35)
MCHC RBC AUTO-ENTMCNC: 28.5 G/DL (ref 32–34.5)
MCV RBC AUTO: 102.7 FL (ref 80–99.9)
MONOCYTES NFR BLD: 0.13 K/UL (ref 0.1–0.95)
MONOCYTES NFR BLD: 2 % (ref 2–12)
NEUTROPHILS NFR BLD: 56 % (ref 43–80)
NEUTS SEG NFR BLD: 4.01 K/UL (ref 1.8–7.3)
PLATELET # BLD AUTO: 142 K/UL (ref 130–450)
PMV BLD AUTO: 11.7 FL (ref 7–12)
RBC # BLD AUTO: 3.35 M/UL (ref 3.5–5.5)
RBC # BLD: ABNORMAL 10*6/UL
WBC OTHER # BLD: 7.2 K/UL (ref 4.5–11.5)

## 2023-09-07 LAB
ALBUMIN SERPL-MCNC: 4.3 G/DL (ref 3.5–5.2)
ALP SERPL-CCNC: 93 U/L (ref 35–104)
ALT SERPL-CCNC: 13 U/L (ref 0–32)
ANION GAP SERPL CALCULATED.3IONS-SCNC: 12 MMOL/L (ref 7–16)
AST SERPL-CCNC: 21 U/L (ref 0–31)
BILIRUB SERPL-MCNC: 0.3 MG/DL (ref 0–1.2)
BUN SERPL-MCNC: 10 MG/DL (ref 6–23)
CALCIUM SERPL-MCNC: 9.7 MG/DL (ref 8.6–10.2)
CHLORIDE SERPL-SCNC: 107 MMOL/L (ref 98–107)
CO2 SERPL-SCNC: 26 MMOL/L (ref 22–29)
CREAT SERPL-MCNC: 1.3 MG/DL (ref 0.5–1)
GFR SERPL CREATININE-BSD FRML MDRD: 45 ML/MIN/1.73M2
GLUCOSE SERPL-MCNC: 75 MG/DL (ref 74–99)
POTASSIUM SERPL-SCNC: 4.4 MMOL/L (ref 3.5–5)
PROT SERPL-MCNC: 8 G/DL (ref 6.4–8.3)
SODIUM SERPL-SCNC: 145 MMOL/L (ref 132–146)

## 2024-02-12 ENCOUNTER — OFFICE VISIT (OUTPATIENT)
Dept: ONCOLOGY | Age: 69
End: 2024-02-12
Payer: MEDICARE

## 2024-02-12 ENCOUNTER — HOSPITAL ENCOUNTER (OUTPATIENT)
Dept: INFUSION THERAPY | Age: 69
Discharge: HOME | End: 2024-02-12
Payer: MEDICARE

## 2024-02-12 VITALS
HEART RATE: 67 BPM | TEMPERATURE: 97.4 F | DIASTOLIC BLOOD PRESSURE: 80 MMHG | SYSTOLIC BLOOD PRESSURE: 156 MMHG | OXYGEN SATURATION: 100 % | WEIGHT: 243 LBS | HEIGHT: 67 IN | BODY MASS INDEX: 38.14 KG/M2

## 2024-02-12 DIAGNOSIS — D68.59 THROMBOPHILIA (HCC): ICD-10-CM

## 2024-02-12 DIAGNOSIS — D68.59 THROMBOPHILIA (HCC): Primary | ICD-10-CM

## 2024-02-12 LAB
ALBUMIN SERPL-MCNC: 4.5 G/DL (ref 3.5–5.2)
ALP SERPL-CCNC: 89 U/L (ref 35–104)
ALT SERPL-CCNC: 14 U/L (ref 0–32)
ANION GAP SERPL CALCULATED.3IONS-SCNC: 10 MMOL/L (ref 7–16)
AST SERPL-CCNC: 18 U/L (ref 0–31)
BASOPHILS # BLD: 0.08 K/UL (ref 0–0.2)
BASOPHILS NFR BLD: 1 % (ref 0–2)
BILIRUB SERPL-MCNC: 0.5 MG/DL (ref 0–1.2)
BUN SERPL-MCNC: 19 MG/DL (ref 6–23)
CALCIUM SERPL-MCNC: 9.7 MG/DL (ref 8.6–10.2)
CHLORIDE SERPL-SCNC: 103 MMOL/L (ref 98–107)
CO2 SERPL-SCNC: 28 MMOL/L (ref 22–29)
CREAT SERPL-MCNC: 1.8 MG/DL (ref 0.5–1)
EOSINOPHIL # BLD: 0.43 K/UL (ref 0.05–0.5)
EOSINOPHILS RELATIVE PERCENT: 7 % (ref 0–6)
ERYTHROCYTE [DISTWIDTH] IN BLOOD BY AUTOMATED COUNT: 13.3 % (ref 11.5–15)
FERRITIN SERPL-MCNC: 91 NG/ML
FOLATE SERPL-MCNC: >20 NG/ML (ref 4.8–24.2)
GFR SERPL CREATININE-BSD FRML MDRD: 32 ML/MIN/1.73M2
GLUCOSE SERPL-MCNC: 92 MG/DL (ref 74–99)
HCT VFR BLD AUTO: 40.5 % (ref 34–48)
HGB BLD-MCNC: 12.6 G/DL (ref 11.5–15.5)
IMM GRANULOCYTES # BLD AUTO: <0.03 K/UL (ref 0–0.58)
IMM GRANULOCYTES NFR BLD: 0 % (ref 0–5)
IRON SATN MFR SERPL: 29 % (ref 15–50)
IRON SERPL-MCNC: 91 UG/DL (ref 37–145)
LYMPHOCYTES NFR BLD: 2 K/UL (ref 1.5–4)
LYMPHOCYTES RELATIVE PERCENT: 30 % (ref 20–42)
MCH RBC QN AUTO: 29.5 PG (ref 26–35)
MCHC RBC AUTO-ENTMCNC: 31.1 G/DL (ref 32–34.5)
MCV RBC AUTO: 94.8 FL (ref 80–99.9)
MONOCYTES NFR BLD: 0.35 K/UL (ref 0.1–0.95)
MONOCYTES NFR BLD: 5 % (ref 2–12)
NEUTROPHILS NFR BLD: 57 % (ref 43–80)
NEUTS SEG NFR BLD: 3.78 K/UL (ref 1.8–7.3)
PLATELET # BLD AUTO: 195 K/UL (ref 130–450)
PMV BLD AUTO: 11.7 FL (ref 7–12)
POTASSIUM SERPL-SCNC: 3.9 MMOL/L (ref 3.5–5)
PROT SERPL-MCNC: 8.3 G/DL (ref 6.4–8.3)
RBC # BLD AUTO: 4.27 M/UL (ref 3.5–5.5)
SODIUM SERPL-SCNC: 141 MMOL/L (ref 132–146)
TIBC SERPL-MCNC: 312 UG/DL (ref 250–450)
VIT B12 SERPL-MCNC: 1060 PG/ML (ref 211–946)
WBC OTHER # BLD: 6.7 K/UL (ref 4.5–11.5)

## 2024-02-12 PROCEDURE — 82746 ASSAY OF FOLIC ACID SERUM: CPT

## 2024-02-12 PROCEDURE — 82728 ASSAY OF FERRITIN: CPT

## 2024-02-12 PROCEDURE — 82607 VITAMIN B-12: CPT

## 2024-02-12 PROCEDURE — 87522 HEPATITIS C REVRS TRNSCRPJ: CPT

## 2024-02-12 PROCEDURE — 83550 IRON BINDING TEST: CPT

## 2024-02-12 PROCEDURE — 36415 COLL VENOUS BLD VENIPUNCTURE: CPT

## 2024-02-12 PROCEDURE — 85025 COMPLETE CBC W/AUTO DIFF WBC: CPT

## 2024-02-12 PROCEDURE — 99214 OFFICE O/P EST MOD 30 MIN: CPT

## 2024-02-12 PROCEDURE — 80053 COMPREHEN METABOLIC PANEL: CPT

## 2024-02-12 PROCEDURE — 83540 ASSAY OF IRON: CPT

## 2024-02-12 RX ORDER — HYDROCHLOROTHIAZIDE 12.5 MG/1
12.5 CAPSULE, GELATIN COATED ORAL DAILY
COMMUNITY

## 2024-02-12 NOTE — PROGRESS NOTES
[facility]  Hematology/Oncology  Consult      Patient Name: Margarita Sanchez  YOB: 1955  PCP: Gokul Valdez APRN - NP   Referring Provider:      Reason for Consultation:   Chief Complaint   Patient presents with    New Patient     Thrombophilia        History of Present Illness:      Diagnostic Data:     Past Medical History:   Diagnosis Date    H/O cardiovascular stress test 05/04/2021    Lexiscan    Hepatitis C     Hypertension     PE (pulmonary embolism) 07/01/2012    x2 in the past    PONV (postoperative nausea and vomiting)     Thyroid disease        Patient Active Problem List    Diagnosis Date Noted    Cholelithiasis 01/11/2013    Acute deep vein thrombosis (DVT) of inferior vena cava (HCC) 08/14/2023    Acute pulmonary embolism without acute cor pulmonale, unspecified pulmonary embolism type (HCC) 08/10/2023    DARWIN (acute kidney injury) (HCC) 08/02/2023    Lumbar disc herniation with radiculopathy 06/22/2023    Precordial pain     Hepatitis C     Thyroid disease     Pulmonary embolism (HCC) 07/01/2012        Past Surgical History:   Procedure Laterality Date    APPENDECTOMY      CT ASP ABS HEMATOMA BULLA CYST  08/03/2023    CT ASP ABS HEMATOMA BULLA CYST 8/3/2023 Sonu Queen MD [facility] CT    HYSTERECTOMY (CERVIX STATUS UNKNOWN)      LAMINECTOMY N/A 07/27/2023    Left L4-L5 vidya-laminectomy performed by Kristen Howell MD at [facility] OR    SHOULDER SURGERY Right     rotator cuff repair    TOTAL KNEE ARTHROPLASTY Bilateral     R TKA 2005    VENA CAVA FILTER PLACEMENT         Family History  Family History   Problem Relation Age of Onset    Asthma Mother     No Known Problems Father        Social History    TOBACCO:   reports that she quit smoking about 2 years ago. Her smoking use included cigarettes. She started smoking about 52 years ago. She has a 50.0 pack-year smoking history. She has been exposed to tobacco smoke. She has never used smokeless tobacco.  ETOH:   reports no history of 
Margarita GODOY Shellirigoberto  1955 68 y.o.      Referring Physician:     PCP: Gokul Valdez APRN - NP    Vitals:    24 0957   BP: (!) 156/80   Pulse: 67   Temp: 97.4 °F (36.3 °C)   SpO2: 100%        Wt Readings from Last 3 Encounters:   24 110.2 kg (243 lb)   23 110 kg (242 lb 6.4 oz)   23 116.6 kg (257 lb 1.6 oz)        Body mass index is 38.06 kg/m².          Chief Complaint:   Chief Complaint   Patient presents with    New Patient     Thrombophilia          Cancer Staging   No matching staging information was found for the patient.    Prior Radiation Therapy? NO    Concurrent Chemo/radiation? NO    Prior Chemotherapy? NO    Prior Hormonal Therapy? NO    Head and Neck Cancer? No, patient does NOT have HN cancer.      LMP: hysterectomy at age 33    Age at first Menses: 13    : 4    Para: 3          Current Outpatient Medications:     hydroCHLOROthiazide 12.5 MG capsule, Take 1 capsule by mouth daily, Disp: , Rfl:     rivaroxaban (XARELTO) 20 MG TABS tablet, Take 1 tablet by mouth Daily with supper, Disp: 30 tablet, Rfl: 3    folic acid (FOLVITE) 1 MG tablet, Take 1 tablet by mouth daily, Disp: 30 tablet, Rfl: 3    vitamin B-12 1000 MCG tablet, Take 1 tablet by mouth daily, Disp: 30 tablet, Rfl: 3    vitamin D (CHOLECALCIFEROL) 50 MCG (2000 UT) TABS tablet, Take 1 tablet by mouth daily, Disp: 30 tablet, Rfl: 3    ondansetron (ZOFRAN) 4 MG tablet, Take 1 tablet by mouth every 8 hours as needed for Nausea or Vomiting, Disp: 20 tablet, Rfl: 0    levothyroxine (SYNTHROID) 125 MCG tablet, Take 1 tablet by mouth Daily, Disp: , Rfl:     acetaminophen (TYLENOL) 325 MG tablet, Take 2 tablets by mouth every 6 hours as needed, Disp: , Rfl:     pravastatin (PRAVACHOL) 40 MG tablet, Take 1 tablet by mouth daily, Disp: , Rfl:     miconazole (MICOTIN) 2 % powder, Apply topically 2 times daily., Disp: 45 g, Rfl: 1    rivaroxaban (XARELTO) 15 MG TABS tablet, Take 1 tablet by mouth 2 times daily (with meals) 
09/06/2023    HCT 34.4 09/06/2023    .7 (H) 09/06/2023     09/06/2023    LYMPHOPCT 35 09/06/2023    RBC 3.35 (L) 09/06/2023    MCH 29.3 09/06/2023    MCHC 28.5 (L) 09/06/2023    RDW 14.4 09/06/2023    NEUTOPHILPCT 56 09/06/2023    MONOPCT 2 09/06/2023    BASOPCT 3 (H) 09/06/2023    NEUTROABS 4.01 09/06/2023    LYMPHSABS 2.50 09/06/2023    MONOSABS 0.13 09/06/2023    EOSABS 0.38 09/06/2023    BASOSABS 0.19 09/06/2023       Lab Results   Component Value Date     09/07/2023    K 4.4 09/07/2023     09/07/2023    CO2 26 09/07/2023    BUN 10 09/07/2023    CREATININE 1.3 (H) 09/07/2023    GLUCOSE 75 09/07/2023    CALCIUM 9.7 09/07/2023    PROT 8.0 09/07/2023    LABALBU 4.3 09/07/2023    BILITOT 0.3 09/07/2023    ALKPHOS 93 09/07/2023    AST 21 09/07/2023    ALT 13 09/07/2023    LABGLOM 45 (L) 09/07/2023    GFRAA 46 10/31/2021       No results found for: \"IRON\", \"TIBC\", \"FERRITIN\"        Radiology-    XR CHEST (2 VW)    Result Date: 7/20/2023  EXAMINATION: TWO XRAY VIEWS OF THE CHEST 7/20/2023 8:13 am COMPARISON: 10 October 2022 HISTORY: ORDERING SYSTEM PROVIDED HISTORY: Pre-op testing TECHNOLOGIST PROVIDED HISTORY: Reason for exam:->Pre-Op testing What reading provider will be dictating this exam?->CRC FINDINGS: The lungs are without acute focal process.  There is no effusion or pneumothorax. The cardiomediastinal silhouette is without acute process. The osseous structures are without acute process.     No acute process.     CT GUIDED NEEDLE PLACEMENT    Result Date: 8/4/2023  PROCEDURE: CT GUIDED NDL PLACEMENT; CT ASP ABS HEMATOMA BULLA CYST MODERATE CONSCIOUS SEDATION 8/3/2023 HISTORY: ORDERING SYSTEM PROVIDED HISTORY: aspiration of subcutaneous fluid collection on lumbar area; concerns for abscess TECHNOLOGIST PROVIDED HISTORY: Reason for exam:->aspiration of subcutaneous fluid collection on lumbar area; concerns for abscess What reading provider will be dictating this exam?->CRC TECHNIQUE:

## 2024-02-15 LAB
HCV RNA # SERPL NAA+PROBE: NOT DETECTED {COPIES}/ML
SPECIMEN SOURCE: NORMAL

## 2024-02-26 ENCOUNTER — HOSPITAL ENCOUNTER (OUTPATIENT)
Dept: INFUSION THERAPY | Age: 69
Discharge: HOME | End: 2024-02-26
Payer: MEDICARE

## 2024-02-26 ENCOUNTER — OFFICE VISIT (OUTPATIENT)
Dept: ONCOLOGY | Age: 69
End: 2024-02-26
Payer: MEDICARE

## 2024-02-26 VITALS
HEART RATE: 69 BPM | BODY MASS INDEX: 37.21 KG/M2 | SYSTOLIC BLOOD PRESSURE: 136 MMHG | OXYGEN SATURATION: 98 % | WEIGHT: 237.1 LBS | TEMPERATURE: 97.7 F | HEIGHT: 67 IN | DIASTOLIC BLOOD PRESSURE: 80 MMHG

## 2024-02-26 DIAGNOSIS — D68.59 THROMBOPHILIA (HCC): Primary | ICD-10-CM

## 2024-02-26 DIAGNOSIS — N18.32 CHRONIC KIDNEY DISEASE (CKD) STAGE G3B/A3, MODERATELY DECREASED GLOMERULAR FILTRATION RATE (GFR) BETWEEN 30-44 ML/MIN/1.73 SQUARE METER AND ALBUMINURIA CREATININE RATIO GREATER THAN 300 MG/G (HCC): Primary | ICD-10-CM

## 2024-02-26 LAB
ALBUMIN SERPL-MCNC: 4.4 G/DL (ref 3.5–5.2)
ALP SERPL-CCNC: 77 U/L (ref 35–104)
ALT SERPL-CCNC: 15 U/L (ref 0–32)
ANION GAP SERPL CALCULATED.3IONS-SCNC: 14 MMOL/L (ref 7–16)
AST SERPL-CCNC: 23 U/L (ref 0–31)
BASOPHILS # BLD: 0.08 K/UL (ref 0–0.2)
BASOPHILS NFR BLD: 1 % (ref 0–2)
BILIRUB SERPL-MCNC: 0.4 MG/DL (ref 0–1.2)
BUN SERPL-MCNC: 41 MG/DL (ref 6–23)
CALCIUM SERPL-MCNC: 9.9 MG/DL (ref 8.6–10.2)
CHLORIDE SERPL-SCNC: 98 MMOL/L (ref 98–107)
CO2 SERPL-SCNC: 28 MMOL/L (ref 22–29)
CREAT SERPL-MCNC: 1.9 MG/DL (ref 0.5–1)
EOSINOPHIL # BLD: 0.35 K/UL (ref 0.05–0.5)
EOSINOPHILS RELATIVE PERCENT: 6 % (ref 0–6)
ERYTHROCYTE [DISTWIDTH] IN BLOOD BY AUTOMATED COUNT: 12.6 % (ref 11.5–15)
GFR SERPL CREATININE-BSD FRML MDRD: 28 ML/MIN/1.73M2
GLUCOSE SERPL-MCNC: 97 MG/DL (ref 74–99)
HCT VFR BLD AUTO: 39.7 % (ref 34–48)
HGB BLD-MCNC: 12.7 G/DL (ref 11.5–15.5)
IMM GRANULOCYTES # BLD AUTO: 0.05 K/UL (ref 0–0.58)
IMM GRANULOCYTES NFR BLD: 1 % (ref 0–5)
LYMPHOCYTES NFR BLD: 1.92 K/UL (ref 1.5–4)
LYMPHOCYTES RELATIVE PERCENT: 32 % (ref 20–42)
MCH RBC QN AUTO: 30.2 PG (ref 26–35)
MCHC RBC AUTO-ENTMCNC: 32 G/DL (ref 32–34.5)
MCV RBC AUTO: 94.5 FL (ref 80–99.9)
MONOCYTES NFR BLD: 0.42 K/UL (ref 0.1–0.95)
MONOCYTES NFR BLD: 7 % (ref 2–12)
NEUTROPHILS NFR BLD: 53 % (ref 43–80)
NEUTS SEG NFR BLD: 3.22 K/UL (ref 1.8–7.3)
PLATELET # BLD AUTO: 231 K/UL (ref 130–450)
PMV BLD AUTO: 11.6 FL (ref 7–12)
POTASSIUM SERPL-SCNC: 3.4 MMOL/L (ref 3.5–5)
PROT SERPL-MCNC: 8.3 G/DL (ref 6.4–8.3)
RBC # BLD AUTO: 4.2 M/UL (ref 3.5–5.5)
SODIUM SERPL-SCNC: 140 MMOL/L (ref 132–146)
WBC OTHER # BLD: 6 K/UL (ref 4.5–11.5)

## 2024-02-26 PROCEDURE — 85025 COMPLETE CBC W/AUTO DIFF WBC: CPT

## 2024-02-26 PROCEDURE — 36415 COLL VENOUS BLD VENIPUNCTURE: CPT

## 2024-02-26 PROCEDURE — 80053 COMPREHEN METABOLIC PANEL: CPT

## 2024-02-26 PROCEDURE — 99213 OFFICE O/P EST LOW 20 MIN: CPT

## 2024-02-26 RX ORDER — DOXYCYCLINE HYCLATE 100 MG
100 TABLET ORAL 2 TIMES DAILY
Qty: 20 TABLET | Refills: 0 | Status: SHIPPED | OUTPATIENT
Start: 2024-02-26 | End: 2024-03-07

## 2024-02-26 NOTE — PROGRESS NOTES
TYPE: Radiation Exposure Index: 13 40 mGy, DLP DESCRIPTION OF PROCEDURE: Informed consent was obtained after a detailed explanation of the procedure including risks, benefits, and alternatives.  Universal protocol was observed. Sterile gowns, masks, hats and gloves utilized for maximal sterile barrier. FINDINGS: Under CT guidance a 17 gauge introducer needle was used to access a subcutaneous fluid collection along the posterior spinal region.  10 cc of dark red fluid was aspirated and sent for culture and sensitivity.     Successful CT-guided aspiration of a fluid collection.  Pathology results are pending.  The patient will follow-up with the referring clinical service.     US DUP LOWER EXTREMITY LEFT MIKAYLA    Result Date: 8/10/2023  EXAMINATION: DUPLEX VENOUS ULTRASOUND OF THE LEFT LOWER EXTREMITY 8/10/2023 1:05 pm TECHNIQUE: Duplex ultrasound using B-mode/gray scaled imaging and Doppler spectral analysis and color flow was obtained of the deep venous structures of the left lower extremity. COMPARISON: None. HISTORY: ORDERING SYSTEM PROVIDED HISTORY: pain TECHNOLOGIST PROVIDED HISTORY: Reason for exam:->pain What reading provider will be dictating this exam?->CRC FINDINGS: Occlusive DVT present involving the common femoral vein, saphenofemoral junction, superficial femoral vein, popliteal vein, tibioperoneal trunk, and proximal calf veins.     Extensive occlusive DVT present.     FLUORO FOR SURGICAL PROCEDURES    Result Date: 7/27/2023  EXAMINATION: SPOT FLUOROSCOPIC IMAGES 7/27/2023 11:03 am TECHNIQUE: Fluoroscopy was provided by the radiology department for procedure. Radiologist was not present during examination. FLUOROSCOPY DOSE AND TYPE: Radiation Exposure Index: Fluoroscopy time equals 9.7 seconds.  Total dose equals 8.56 mGy, COMPARISON: None HISTORY: ORDERING SYSTEM PROVIDED HISTORY: lumbar laminectomy TECHNOLOGIST PROVIDED HISTORY: Reason for exam:->lumbar laminectomy What reading provider will be

## 2024-03-25 DIAGNOSIS — I82.220 ACUTE DEEP VEIN THROMBOSIS (DVT) OF INFERIOR VENA CAVA (HCC): Primary | ICD-10-CM

## 2024-03-25 RX ORDER — DOXYCYCLINE HYCLATE 100 MG
100 TABLET ORAL 2 TIMES DAILY
Qty: 20 TABLET | Refills: 0 | Status: SHIPPED | OUTPATIENT
Start: 2024-03-25 | End: 2024-04-04

## 2024-05-15 ENCOUNTER — HOSPITAL ENCOUNTER (OUTPATIENT)
Age: 69
Discharge: HOME | End: 2024-05-15
Attending: INTERNAL MEDICINE
Payer: MEDICARE

## 2024-05-15 ENCOUNTER — HOSPITAL ENCOUNTER (OUTPATIENT)
Dept: ULTRASOUND IMAGING | Age: 69
Discharge: HOME | End: 2024-05-15
Attending: INTERNAL MEDICINE
Payer: MEDICARE

## 2024-05-15 DIAGNOSIS — E78.5 HYPERLIPIDEMIA, UNSPECIFIED HYPERLIPIDEMIA TYPE: ICD-10-CM

## 2024-05-15 DIAGNOSIS — E87.6 HYPOPOTASSEMIA: ICD-10-CM

## 2024-05-15 DIAGNOSIS — N18.32 CHRONIC KIDNEY DISEASE (CKD) STAGE G3B/A1, MODERATELY DECREASED GLOMERULAR FILTRATION RATE (GFR) BETWEEN 30-44 ML/MIN/1.73 SQUARE METER AND ALBUMINURIA CREATININE RATIO LESS THAN 30 MG/G (HCC): ICD-10-CM

## 2024-05-15 DIAGNOSIS — I12.9 RENAL HYPERTENSION: ICD-10-CM

## 2024-05-15 LAB
ANION GAP SERPL CALCULATED.3IONS-SCNC: 13 MMOL/L (ref 7–16)
BUN SERPL-MCNC: 23 MG/DL (ref 6–23)
CALCIUM SERPL-MCNC: 9.5 MG/DL (ref 8.6–10.2)
CHLORIDE SERPL-SCNC: 103 MMOL/L (ref 98–107)
CO2 SERPL-SCNC: 24 MMOL/L (ref 22–29)
CREAT SERPL-MCNC: 1.5 MG/DL (ref 0.5–1)
GFR, ESTIMATED: 37 ML/MIN/1.73M2
GLUCOSE SERPL-MCNC: 91 MG/DL (ref 74–99)
POTASSIUM SERPL-SCNC: 4.6 MMOL/L (ref 3.5–5)
SODIUM SERPL-SCNC: 140 MMOL/L (ref 132–146)

## 2024-05-15 PROCEDURE — 80048 BASIC METABOLIC PNL TOTAL CA: CPT

## 2024-05-15 PROCEDURE — 76775 US EXAM ABDO BACK WALL LIM: CPT | Performed by: INTERNAL MEDICINE

## 2024-05-15 PROCEDURE — 76770 US EXAM ABDO BACK WALL COMP: CPT

## 2024-05-15 PROCEDURE — 36415 COLL VENOUS BLD VENIPUNCTURE: CPT

## 2025-07-15 ENCOUNTER — TRANSCRIBE ORDERS (OUTPATIENT)
Dept: ADMINISTRATIVE | Age: 70
End: 2025-07-15

## 2025-07-15 DIAGNOSIS — R09.89 OTH SYMPTOMS AND SIGNS INVOLVING THE CIRC AND RESP SYSTEMS: Primary | ICD-10-CM

## (undated) DEVICE — BLADE ES L6IN ELASTOMERIC COAT EXT DURABLE BEND UPTO 90DEG

## (undated) DEVICE — LIQUIBAND RAPID ADHESIVE 36/CS 0.8ML: Brand: MEDLINE

## (undated) DEVICE — GLOVE SURG SZ 65 THK91MIL LTX FREE SYN POLYISOPRENE

## (undated) DEVICE — PREMIUM WET SKIN PREP TRAY: Brand: MEDLINE INDUSTRIES, INC.

## (undated) DEVICE — 5.0MM PRECISION ROUND

## (undated) DEVICE — 3M(TM) MEDIPORE(TM) +PAD SOFT CLOTH ADHESIVE WOUND DRESSING 3569: Brand: 3M™ MEDIPORE™

## (undated) DEVICE — APPLICATOR PREP 26ML 0.7% IOD POVACRYLEX 74% ISO ALC ST

## (undated) DEVICE — GOWN,SIRUS,FABRNF,XL,20/CS: Brand: MEDLINE

## (undated) DEVICE — GLOVE ORANGE PI 8   MSG9080

## (undated) DEVICE — ELECTRODE PT RET AD L9FT HI MOIST COND ADH HYDRGEL CORDED

## (undated) DEVICE — GLOVE SURG 8.5 PF POLYMER WHT STRL SIGN LTX ESSENTIAL LTX

## (undated) DEVICE — GOWN,SIRUS,FABRNF,L,20/CS: Brand: MEDLINE

## (undated) DEVICE — SYRINGE 20ML LL S/C 50

## (undated) DEVICE — TUBING SUCT 12FR MAL ALUM SHFT FN CAP VENT UNIV CONN W/ OBT

## (undated) DEVICE — GLOVE ORANGE PI 7   MSG9070

## (undated) DEVICE — LUMBAR LAMINECTOMY: Brand: MEDLINE INDUSTRIES, INC.

## (undated) DEVICE — NEEDLE SPNL L3.5IN PNK HUB S STL REG WALL FIT STYL W/ QNCKE

## (undated) DEVICE — TUBING, SUCTION, 3/16" X 12', STRAIGHT: Brand: MEDLINE

## (undated) DEVICE — JACKSON TABLE POSITIONER KIT: Brand: MEDLINE INDUSTRIES, INC.

## (undated) DEVICE — 3M™ IOBAN™ 2 ANTIMICROBIAL INCISE DRAPE 6650EZ: Brand: IOBAN™ 2

## (undated) DEVICE — DRAPE,REIN 53X77,STERILE: Brand: MEDLINE

## (undated) DEVICE — HYPODERMIC SAFETY NEEDLE: Brand: MAGELLAN